# Patient Record
Sex: FEMALE | Race: WHITE | Employment: FULL TIME | URBAN - NONMETROPOLITAN AREA
[De-identification: names, ages, dates, MRNs, and addresses within clinical notes are randomized per-mention and may not be internally consistent; named-entity substitution may affect disease eponyms.]

---

## 2017-09-09 ENCOUNTER — OFFICE VISIT (OUTPATIENT)
Dept: PRIMARY CARE CLINIC | Age: 57
End: 2017-09-09
Payer: COMMERCIAL

## 2017-09-09 VITALS — DIASTOLIC BLOOD PRESSURE: 84 MMHG | TEMPERATURE: 98.4 F | SYSTOLIC BLOOD PRESSURE: 141 MMHG | HEART RATE: 71 BPM

## 2017-09-09 DIAGNOSIS — K13.0 CHEILITIS: Primary | ICD-10-CM

## 2017-09-09 DIAGNOSIS — H60.313 ACUTE DIFFUSE OTITIS EXTERNA OF BOTH EARS: ICD-10-CM

## 2017-09-09 PROCEDURE — 99213 OFFICE O/P EST LOW 20 MIN: CPT | Performed by: NURSE PRACTITIONER

## 2017-09-09 RX ORDER — LANOLIN 100 %
OIL (ML) MISCELLANEOUS
Qty: 1 BOTTLE | Refills: 0 | Status: SHIPPED | OUTPATIENT
Start: 2017-09-09 | End: 2018-03-14 | Stop reason: ALTCHOICE

## 2017-09-09 ASSESSMENT — ENCOUNTER SYMPTOMS
RHINORRHEA: 0
ABDOMINAL PAIN: 0
COUGH: 0

## 2017-12-12 ENCOUNTER — OFFICE VISIT (OUTPATIENT)
Dept: PRIMARY CARE CLINIC | Age: 57
End: 2017-12-12
Payer: COMMERCIAL

## 2017-12-12 VITALS
HEART RATE: 66 BPM | DIASTOLIC BLOOD PRESSURE: 85 MMHG | TEMPERATURE: 98.2 F | RESPIRATION RATE: 22 BRPM | WEIGHT: 117 LBS | BODY MASS INDEX: 18.36 KG/M2 | OXYGEN SATURATION: 98 % | SYSTOLIC BLOOD PRESSURE: 147 MMHG | HEIGHT: 67 IN

## 2017-12-12 DIAGNOSIS — J20.9 ACUTE BRONCHITIS, UNSPECIFIED ORGANISM: Primary | ICD-10-CM

## 2017-12-12 PROCEDURE — 99213 OFFICE O/P EST LOW 20 MIN: CPT | Performed by: NURSE PRACTITIONER

## 2017-12-12 RX ORDER — AMOXICILLIN AND CLAVULANATE POTASSIUM 875; 125 MG/1; MG/1
1 TABLET, FILM COATED ORAL 2 TIMES DAILY
Qty: 20 TABLET | Refills: 0 | Status: SHIPPED | OUTPATIENT
Start: 2017-12-12 | End: 2017-12-22

## 2017-12-12 RX ORDER — PREDNISONE 20 MG/1
TABLET ORAL
Qty: 12 TABLET | Refills: 0 | Status: SHIPPED | OUTPATIENT
Start: 2017-12-12 | End: 2018-03-02 | Stop reason: ALTCHOICE

## 2017-12-12 RX ORDER — ALBUTEROL SULFATE 90 UG/1
2 AEROSOL, METERED RESPIRATORY (INHALATION) EVERY 6 HOURS PRN
Qty: 1 INHALER | Refills: 0 | Status: SHIPPED | OUTPATIENT
Start: 2017-12-12 | End: 2019-04-04

## 2017-12-12 ASSESSMENT — ENCOUNTER SYMPTOMS
WHEEZING: 0
NAUSEA: 0
RHINORRHEA: 1
VOMITING: 0
COUGH: 1
DIARRHEA: 0
SORE THROAT: 0
SHORTNESS OF BREATH: 0

## 2017-12-12 NOTE — PATIENT INSTRUCTIONS
infection you have had within the past several weeks. To make sure prednisone is safe for you, tell your doctor if you have:  · any illness that causes diarrhea;  · liver disease (such as cirrhosis);  · kidney disease;  · heart disease, high blood pressure, low levels of potassium in your blood;  · a thyroid disorder;  · diabetes;  · a history of malaria;  · tuberculosis;  · osteoporosis;  · glaucoma, cataracts, or herpes infection of the eyes;  · stomach ulcers, ulcerative colitis, or a history of stomach bleeding;  · a muscle disorder such as myasthenia gravis; or  · depression or mental illness. Long-term use of steroids may lead to bone loss (osteoporosis), especially if you smoke, if you do not exercise, if you do not get enough vitamin D or calcium in your diet, or if you have a family history of osteoporosis. Talk with your doctor about your risk of osteoporosis. Prednisone can cause low birth weight or birth defects if you take the medicine during your first trimester. Tell your doctor if you are pregnant or plan to become pregnant while using this medication. Use effective birth control. Prednisone can pass into breast milk and may harm a nursing baby. Tell your doctor if you are breast-feeding a baby. Steroids can affect growth in children. Talk with your doctor if you think your child is not growing at a normal rate while using this medication. How should I take prednisone? Follow all directions on your prescription label. Your doctor may occasionally change your dose to make sure you get the best results. Do not take this medicine in larger or smaller amounts or for longer than recommended. Take with food. Your dosage needs may change if you have any unusual stress such as a serious illness, fever or infection, or if you have surgery or a medical emergency. Do not change your medication dose or schedule without your doctor's advice.   Measure liquid medicine with a special dose-measuring spoon or medicine cup. If you do not have a dose-measuring device, ask your pharmacist for one. Do not crush, chew, or break a delayed-release tablet. Swallow it whole. While using prednisone, you may need frequent blood tests at your doctor's office. Your blood pressure may also need to be checked. This medication can cause unusual results with certain medical tests. Tell any doctor who treats you that you are using prednisone. You should not stop using prednisone suddenly. Follow your doctor's instructions about tapering your dose. Wear a medical alert tag or carry an ID card stating that you take prednisone. Any medical care provider who treats you should know that you are using a steroid. Store at room temperature away from moisture and heat. What happens if I miss a dose? Take the missed dose as soon as you remember. Skip the missed dose if it is almost time for your next scheduled dose. Do not take extra medicine to make up the missed dose. What happens if I overdose? Seek emergency medical attention or call the Poison Help line at 1-256.223.5341. An overdose of prednisone is not expected to produce life threatening symptoms. However, long term use of high steroid doses can lead to symptoms such as thinning skin, easy bruising, changes in the shape or location of body fat (especially in your face, neck, back, and waist), increased acne or facial hair, menstrual problems, impotence, or loss of interest in sex. What should I avoid while taking prednisone? Avoid being near people who are sick or have infections. Call your doctor for preventive treatment if you are exposed to chicken pox or measles. These conditions can be serious or even fatal in people who are using a steroid. Do not receive a \"live\" vaccine while using prednisone. Prednisone may increase your risk of harmful effects from a live vaccine.  Live vaccines include measles, mumps, rubella (MMR), rotavirus, typhoid, yellow fever, varicella (chickenpox), zoster (shingles), and nasal flu (influenza) vaccine. Avoid drinking alcohol while you are taking prednisone. What are the possible side effects of prednisone? Get emergency medical help if you have any of these signs of an allergic reaction: hives; difficult breathing; swelling of your face, lips, tongue, or throat. Call your doctor at once if you have:  · blurred vision, eye pain, or seeing halos around lights;  · swelling, rapid weight gain, feeling short of breath;  · severe depression, feelings of extreme happiness or sadness, changes in personality or behavior, seizure (convulsions);  · bloody or tarry stools, coughing up blood;  · pancreatitis (severe pain in your upper stomach spreading to your back, nausea and vomiting, fast heart rate);  · low potassium (confusion, uneven heart rate, extreme thirst, increased urination, leg discomfort, muscle weakness or limp feeling); or  · dangerously high blood pressure (severe headache, blurred vision, buzzing in your ears, anxiety, confusion, chest pain, shortness of breath, uneven heartbeats, seizure). Other common side effects may include:  · sleep problems (insomnia), mood changes;  · increased appetite, gradual weight gain;  · acne, increased sweating, dry skin, thinning skin, bruising or discoloration;  · slow wound healing;  · headache, dizziness, spinning sensation;  · nausea, stomach pain, bloating; or  · changes in the shape or location of body fat (especially in your arms, legs, face, neck, breasts, and waist). This is not a complete list of side effects and others may occur. Call your doctor for medical advice about side effects. You may report side effects to FDA at 1-725-FDA-1257. What other drugs will affect prednisone? Many drugs can interact with prednisone. Not all possible interactions are listed here.  Tell your doctor about all your medications and any you start or stop using during treatment with prednisone, your face, lips, tongue, or throat. Call your doctor at once if you have:  · wheezing, choking, or other breathing problems after using this medicine;  · chest pain, fast heart rate, pounding heartbeats or fluttering in your chest;  · pain or burning when you urinate;  · high blood sugar --increased thirst, increased urination, hunger, dry mouth, fruity breath odor, drowsiness, dry skin, blurred vision, weight loss; or  · low potassium --leg cramps, constipation, irregular heartbeats, fluttering in your chest, extreme thirst, increased urination, numbness or tingling, muscle weakness or limp feeling. Common side effects may include:  · back pain, body aches;  · headache, dizziness;  · feeling nervous;  · nausea, diarrhea, upset stomach; or  · sore throat, sinus pain, stuffy runny nose. This is not a complete list of side effects and others may occur. Call your doctor for medical advice about side effects. You may report side effects to FDA at 3-101-FDA-7298. What other drugs will affect albuterol inhalation? Tell your doctor about all your current medicines and any you start or stop using, especially:  · any other inhaled medicines or bronchodilators;  · digoxin;  · a diuretic or \"water pill\";  · an antidepressant --amitriptyline, desipramine, imipramine, doxepin, nortriptyline, and others;  · a beta blocker --atenolol, carvedilol, labetalol, metoprolol, propranolol, sotalol, and others; or  · an MAO inhibitor --isocarboxazid, linezolid, methylene blue injection, phenelzine, rasagiline, selegiline, tranylcypromine, and others. This list is not complete. Other drugs may interact with albuterol inhalation, including prescription and over-the-counter medicines, vitamins, and herbal products. Not all possible interactions are listed in this medication guide. Where can I get more information? Your pharmacist can provide more information about albuterol inhalation.     Remember, keep this and all other medicines amoxicillin and clavulanate potassium  Pronunciation:  am OKS i tanmay in JONATHAN cleary po TAS ee um  Brand:  Augmentin, Augmentin ES-600, Augmentin XR  What is the most important information I should know about amoxicillin and clavulanate potassium? Do not use this medication if you are allergic to amoxicillin or clavulanate potassium, or if you have ever had liver problems caused by this medication. Do not use if you are allergic to any other penicillin antibiotic, such as amoxicillin (Amoxil, Augmentin, Dispermox, Moxatag), ampicillin (Principen, Unasyn), dicloxacillin (Dycill, Dynapen), oxacillin (Bactocill), or penicillin (Bicillin L-A, PC Pen VK, Pfizerpen), and others. Before taking amoxicillin and clavulanate potassium, tell your doctor if you have liver disease (or a history of hepatitis or jaundice), kidney disease, or mononucleosis, or if you are allergic to a cephalosporin antibiotic, such as cefdinir (Omnicef), cefprozil (Cefzil), cefuroxime (Ceftin), cephalexin (Keflex), and others. If you switch from one tablet form to another (regular, chewable, or extended-release tablet), take only the new tablet form and strength prescribed for you. This medicine may not be as effective or could be harmful if you do not use the exact tablet form your doctor has prescribed. Amoxicillin and clavulanate potassium can pass into breast milk and may harm a nursing baby. Do not use this medication without telling your doctor if you are breast-feeding a baby. Amoxicillin and clavulanate potassium can make birth control pills less effective. Ask your doctor about using a non-hormone method of birth control (such as a condom, diaphragm, spermicide) to prevent pregnancy while taking amoxicillin and clavulanate potassium. What is amoxicillin and clavulanate potassium? Amoxicillin is an antibiotic in a group of drugs called penicillins. Amoxicillin fights bacteria in the body.   Clavulanate potassium is a form medication without telling your doctor if you are breast-feeding a baby. The liquid and chewable tablet forms of this medication may contain phenylalanine. Talk to your doctor before using these forms of amoxicillin and clavulanate potassium if you have phenylketonuria (PKU). How should I take amoxicillin and clavulanate potassium? Take exactly as prescribed by your doctor. Do not take in larger or smaller amounts or for longer than recommended. Follow the directions on your prescription label. If you switch from one tablet form to another (regular, chewable, or extended-release tablet), take only the new tablet form and strength prescribed for you. The strength of clavulanate potassium is not the same among the different tablet forms, even though the amount of amoxicillin may be the same as in the tablet you were using before. This medicine may not be as effective or could be harmful if you do not use the exact tablet form your doctor has prescribed. Take this medicine with a full glass of water. Take the medicine at the start of a meal to reduce stomach upset. Take the medicine at the same time each day. The Augmentin tablet should be swallowed whole. The Augmentin Chewable tablet must be chewed before swallowing. Do not swallow a chewable tablet whole. Do not crush or chew the Augmentin XR (extended-release) tablet. Swallow the pill whole, or break the pill in half and take both halves one at a time. If you have trouble swallowing a whole or half pill, talk with your doctor about using another form of amoxicillin and clavulanate potassium. Shake the liquid form of this medicine well just before you measure a dose. To be sure you get the correct dose, measure the liquid with a marked measuring spoon or medicine cup, not with a regular table spoon. If you do not have a dose-measuring device, ask your pharmacist for one. Take this medication for the full prescribed length of time.  Your symptoms may improve before the infection is completely cleared. Skipping doses may also increase your risk of further infection that is resistant to antibiotics. Amoxicillin and clavulanate potassium will not treat a viral infection such as the common cold or flu. This medication can cause false results with certain lab tests for glucose (sugar) in the urine. Tell any doctor who treats you that you are using amoxicillin and clavulanate potassium. Store the tablets at room temperature away from moisture and heat. Store the liquid  in the refrigerator. Throw away any unused liquid after 10 days. What happens if I miss a dose? Take the missed dose as soon as you remember. Skip the missed dose if it is almost time for your next scheduled dose. Do not take extra medicine to make up the missed dose. What happens if I overdose? Seek emergency medical attention or call the Poison Help line at 1-583.521.1799. Overdose can cause nausea, vomiting, stomach pain, diarrhea, skin rash, drowsiness, and hyperactivity. What should I avoid while taking amoxicillin and clavulanate potassium? Antibiotic medicines can cause diarrhea, which may be a sign of a new infection. If you have diarrhea that is watery or has blood in it, stop taking this medication and call your doctor. Do not use any medicine to stop the diarrhea unless your doctor has told you to. What are the possible side effects of amoxicillin and clavulanate potassium? Get emergency medical help if you have any of these signs of an allergic reaction: hives; difficulty breathing; swelling of your face, lips, tongue, or throat.   Stop using this medicine and call your doctor at once if you have a serious side effect such as:  · diarrhea that is watery or has blood in it;  · pale or yellowed skin, dark colored urine, fever, confusion or weakness;  · easy bruising or bleeding;  · skin rash, bruising, severe tingling, numbness, pain, muscle weakness;  · agitation, confusion, unusual thoughts or behavior, seizure (convulsions);  · nausea, upper stomach pain, itching, loss of appetite, dark urine, sally-colored stools, jaundice (yellowing of the skin or eyes); or  · severe skin reaction -- fever, sore throat, swelling in your face or tongue, burning in your eyes, skin pain, followed by a red or purple skin rash that spreads (especially in the face or upper body) and causes blistering and peeling. Less serious side effects may include:  · mild diarrhea, gas, stomach pain;  · nausea or vomiting;  · headache;  · skin rash or itching;  · white patches in your mouth or throat; or  · vaginal yeast infection (itching or discharge). This is not a complete list of side effects and others may occur. Call your doctor for medical advice about side effects. You may report side effects to FDA at 6-652-FDA-4966. What other drugs will affect amoxicillin and clavulanate potassium? Tell your doctor about all other medications you use, especially:  · allopurinol (Zyloprim);  · probenecid (Benemid);  · a blood thinner such as warfarin (Coumadin, Jantoven); or  · another antibiotic (for the same or for a different infection). This list is not complete and other drugs may interact with amoxicillin and clavulanate potassium. Tell your doctor about all medications you use. This includes prescription, over-the-counter, vitamin, and herbal products. Do not start a new medication without telling your doctor. Where can I get more information? Your pharmacist can provide more information about amoxicillin and clavulanate potassium. Remember, keep this and all other medicines out of the reach of children, never share your medicines with others, and use this medication only for the indication prescribed. Every effort has been made to ensure that the information [facility] is accurate, up-to-date, and complete, but no guarantee is made to that effect.  Drug information contained cause colds. Antibiotics usually do not help and they may be harmful. Bronchitis usually develops rapidly and lasts about 2 to 3 weeks in otherwise healthy people. Follow-up care is a key part of your treatment and safety. Be sure to make and go to all appointments, and call your doctor if you are having problems. It's also a good idea to know your test results and keep a list of the medicines you take. How can you care for yourself at home? · Take all medicines exactly as prescribed. Call your doctor if you think you are having a problem with your medicine. · Get some extra rest.  · Take an over-the-counter pain medicine, such as acetaminophen (Tylenol), ibuprofen (Advil, Motrin), or naproxen (Aleve) to reduce fever and relieve body aches. Read and follow all instructions on the label. · Do not take two or more pain medicines at the same time unless the doctor told you to. Many pain medicines have acetaminophen, which is Tylenol. Too much acetaminophen (Tylenol) can be harmful. · Take an over-the-counter cough medicine that contains dextromethorphan to help quiet a dry, hacking cough so that you can sleep. Avoid cough medicines that have more than one active ingredient. Read and follow all instructions on the label. · Breathe moist air from a humidifier, hot shower, or sink filled with hot water. The heat and moisture will thin mucus so you can cough it out. · Do not smoke. Smoking can make bronchitis worse. If you need help quitting, talk to your doctor about stop-smoking programs and medicines. These can increase your chances of quitting for good. When should you call for help? Call 911 anytime you think you may need emergency care. For example, call if:  ? · You have severe trouble breathing. ?Call your doctor now or seek immediate medical care if:  ? · You have new or worse trouble breathing. ? · You cough up dark brown or bloody mucus (sputum). ? · You have a new or higher fever.    ? · You is not approved for use by anyone younger than 3years old. How should I use albuterol inhalation? Follow all directions on your prescription label. Do not use this medicine in larger or smaller amounts or for longer than recommended. Read all patient information, medication guides, and instruction sheets provided to you. Ask your doctor or pharmacist if you have any questions. You may need to prime your inhaler device before the first use. Your medicine comes with directions for priming if needed. You may also need to shake your medicine just before each use. Follow all medication instructions very carefully. Do not allow a young child to use albuterol inhalation without help from an adult. The usual dose of albuterol inhalation is 2 inhalations every 4 to 6 hours. To prevent exercise-induced bronchospasm, use 2 inhalations 15 to 30 minutes before you exercise. The effects of albuterol inhalation should last about 4 to 6 hours. Seek medical attention if you think your asthma medications are not working as well. An increased need for medication could be an early sign of a serious asthma attack. Use the dose counter on your inhaler device and get your prescription refilled before you run out of medicine completely. Always use the new inhaler device provided with your refill. Do not float a medicine canister in water to see if it is empty. Follow all product instructions on how to clean your inhaler device and mouthpiece. Do not try to clean or take apart the ProAir RespiClick inhaler device. Asthma is often treated with a combination of drugs. Use all medications as directed by your doctor. Read the medication guide or patient instructions provided with each medication. Do not change your doses or medication schedule without your doctor's advice. Store this medicine at room temperature away from moisture, heat, or cold temperatures.   Keep the medicine canister away from open flame or high heat, such Prednisone prevents the release of substances in the body that cause inflammation. Prednisone also suppresses the immune system. Prednisone is used as an anti-inflammatory or an immunosuppressant medication. Prednisone treats many different conditions such as allergic disorders, skin conditions, ulcerative colitis, arthritis, lupus, psoriasis, or breathing disorders. Prednisone may also be used for purposes not listed in this medication guide. What should I discuss with my healthcare provider before taking prednisone? You should not use this medication if you are allergic to prednisone, or if you have a fungal infection anywhere in your body. Steroid medication can weaken your immune system, making it easier for you to get an infection or worsening an infection you already have or have recently had. Tell your doctor about any illness or infection you have had within the past several weeks. To make sure prednisone is safe for you, tell your doctor if you have:  any illness that causes diarrhea;  liver disease (such as cirrhosis);  kidney disease;  heart disease, high blood pressure, low levels of potassium in your blood;  a thyroid disorder;  diabetes;  a history of malaria;  tuberculosis;  osteoporosis;  glaucoma, cataracts, or herpes infection of the eyes;  stomach ulcers, ulcerative colitis, or a history of stomach bleeding;  a muscle disorder such as myasthenia gravis; or  depression or mental illness. Long-term use of steroids may lead to bone loss (osteoporosis), especially if you smoke, if you do not exercise, if you do not get enough vitamin D or calcium in your diet, or if you have a family history of osteoporosis. Talk with your doctor about your risk of osteoporosis. Prednisone can cause low birth weight or birth defects if you take the medicine during your first trimester. Tell your doctor if you are pregnant or plan to become pregnant while using this medication.  Use effective birth control. Prednisone can pass into breast milk and may harm a nursing baby. Tell your doctor if you are breast-feeding a baby. Steroids can affect growth in children. Talk with your doctor if you think your child is not growing at a normal rate while using this medication. How should I take prednisone? Follow all directions on your prescription label. Your doctor may occasionally change your dose to make sure you get the best results. Do not take this medicine in larger or smaller amounts or for longer than recommended. Take with food. Your dosage needs may change if you have any unusual stress such as a serious illness, fever or infection, or if you have surgery or a medical emergency. Do not change your medication dose or schedule without your doctor's advice. Measure liquid medicine with a special dose-measuring spoon or medicine cup. If you do not have a dose-measuring device, ask your pharmacist for one. Do not crush, chew, or break a delayed-release tablet. Swallow it whole. While using prednisone, you may need frequent blood tests at your doctor's office. Your blood pressure may also need to be checked. This medication can cause unusual results with certain medical tests. Tell any doctor who treats you that you are using prednisone. You should not stop using prednisone suddenly. Follow your doctor's instructions about tapering your dose. Wear a medical alert tag or carry an ID card stating that you take prednisone. Any medical care provider who treats you should know that you are using a steroid. Store at room temperature away from moisture and heat. What happens if I miss a dose? Take the missed dose as soon as you remember. Skip the missed dose if it is almost time for your next scheduled dose. Do not take extra medicine to make up the missed dose. What happens if I overdose? Seek emergency medical attention or call the Poison Help line at 1-661.163.4923.   An overdose of prednisone is not include:  sleep problems (insomnia), mood changes;  increased appetite, gradual weight gain;  acne, increased sweating, dry skin, thinning skin, bruising or discoloration;  slow wound healing;  headache, dizziness, spinning sensation;  nausea, stomach pain, bloating; or  changes in the shape or location of body fat (especially in your arms, legs, face, neck, breasts, and waist). This is not a complete list of side effects and others may occur. Call your doctor for medical advice about side effects. You may report side effects to FDA at 4-723-KWF-2059. What other drugs will affect prednisone? Many drugs can interact with prednisone. Not all possible interactions are listed here. Tell your doctor about all your medications and any you start or stop using during treatment with prednisone, especially:  amphotericin B;  cyclosporine;  digoxin, digitalis;  [place] wort;  an antibiotic such as clarithromycin or telithromycin;  antifungal medication such as itraconazole, ketoconazole, posaconazole, voriconazole;  birth control pills and other hormones;  a blood thinner such as warfarin, Coumadin;  a diuretic or \"water pill\";  the hepatitis C medications boceprevir or telaprevir;  HIV or AIDS medicine such as atazanavir, delavirdine, efavirenz, fosamprenavir, indinavir, nelfinavir, nevirapine, ritonavir, saquinavir;  insulin or diabetes medications you take by mouth;  a non-steroidal anti-inflammatory drug (NSAID) such as aspirin, ibuprofen (Advil, Motrin), naproxen (Aleve), celecoxib, diclofenac, indomethacin, meloxicam, and others;  seizure medications such as carbamazepine, fosphenytoin, oxcarbazepine, phenobarbital, phenytoin, primidone; or  the tuberculosis medications isoniazid, rifabutin, rifapentine, or rifampin. This list is not complete and many other drugs can interact with prednisone. This includes prescription and over-the-counter medicines, vitamins, and herbal products.  Give a list of all your medicines to any healthcare provider who treats you. Where can I get more information? Your pharmacist can provide more information about prednisone. Remember, keep this and all other medicines out of the reach of children, never share your medicines with others, and use this medication only for the indication prescribed. Every effort has been made to ensure that the information provided by [facility] [address] [address] is accurate, up-to-date, and complete, but no guarantee is made to that effect. Drug information contained herein may be time sensitive. [facility] information has been compiled for use by healthcare practitioners and consumers in the [place] and therefore [facility] does not warrant that uses outside of the [place] are appropriate, unless specifically indicated otherwise. [facility]'s drug information does not endorse drugs, diagnose patients or recommend therapy. [facility]'s drug information is an informational resource designed to assist licensed healthcare practitioners in caring for their patients and/or to serve consumers viewing this service as a supplement to, and not a substitute for, the expertise, skill, knowledge and judgment of healthcare practitioners. The absence of a warning for a given drug or drug combination in no way should be construed to indicate that the drug or drug combination is safe, effective or appropriate for any given patient. [facility] does not assume any responsibility for any aspect of healthcare administered with the aid of information [facility] provides. The information contained herein is not intended to cover all possible uses, directions, precautions, warnings, drug interactions, allergic reactions, or adverse effects. If you have questions about the drugs you are taking, check with your doctor, nurse or pharmacist.  Copyright 1590-1300 [address]. Version: 9.01. Revision date: 2/13/2013. Care instructions adapted under license by [facility] ([facility]).  If you have

## 2017-12-12 NOTE — PROGRESS NOTES
normal. No mastoid tenderness. Tympanic membrane is not injected, not erythematous and not bulging. No middle ear effusion. Nose: Mucosal edema and rhinorrhea present. Right sinus exhibits no maxillary sinus tenderness and no frontal sinus tenderness. Left sinus exhibits no maxillary sinus tenderness and no frontal sinus tenderness. Mouth/Throat: Uvula is midline, oropharynx is clear and moist and mucous membranes are normal. No oropharyngeal exudate, posterior oropharyngeal edema, posterior oropharyngeal erythema or tonsillar abscesses. Eyes: Conjunctivae are normal. Pupils are equal, round, and reactive to light. Right eye exhibits no discharge. Left eye exhibits no discharge. No scleral icterus. Cardiovascular: Normal rate, regular rhythm, S1 normal, S2 normal, normal heart sounds and intact distal pulses. Exam reveals no gallop and no friction rub. No murmur heard. Pulmonary/Chest: Effort normal. No accessory muscle usage. No tachypnea and no bradypnea. No respiratory distress. She has no decreased breath sounds. She has wheezes. She has no rhonchi. She has no rales. Harsh, moist, productive cough that is worse with deep inspiration. Breath sounds with expiratory wheezes B/L anterior and posterior lobes. Chest expansion symmetrical.  No audible wheezing or respiratory distress. No rales or rhonchi. Musculoskeletal: Normal range of motion. Lymphadenopathy:     She has no cervical adenopathy. Right cervical: No superficial cervical and no posterior cervical adenopathy present. Left cervical: No superficial cervical and no posterior cervical adenopathy present. Neurological: She is alert and oriented to person, place, and time. Skin: Skin is warm and dry. No rash noted. She is not diaphoretic. No erythema. No pallor. Psychiatric: She has a normal mood and affect. Her behavior is normal.   Nursing note and vitals reviewed.     BP (!) 147/85   Pulse 66   Temp 98.2 °F (36.8 °C) (Oral)   Resp 22   Ht 5' 7\" (1.702 m)   Wt 117 lb (53.1 kg)   SpO2 98%   BMI 18.32 kg/m²     Assessment:     1. Acute bronchitis, unspecified organism  amoxicillin-clavulanate (AUGMENTIN) 875-125 MG per tablet    predniSONE (DELTASONE) 20 MG tablet    albuterol sulfate HFA (PROAIR HFA) 108 (90 Base) MCG/ACT inhaler       Plan:      Return if symptoms worsen or fail to improve, for Resume all previous medications as directed. Orders Placed This Encounter   Medications    amoxicillin-clavulanate (AUGMENTIN) 875-125 MG per tablet     Sig: Take 1 tablet by mouth 2 times daily for 10 days     Dispense:  20 tablet     Refill:  0    predniSONE (DELTASONE) 20 MG tablet     Sig: Take 3 tabs by mouth on Days 1 & 2, 2 tabs on Days 3 & 4, 1 tab on Days 5 & 6. Take with food. Dispense:  12 tablet     Refill:  0    albuterol sulfate HFA (PROAIR HFA) 108 (90 Base) MCG/ACT inhaler     Sig: Inhale 2 puffs into the lungs every 6 hours as needed for Wheezing     Dispense:  1 Inhaler     Refill:  0      · Antibiotic as directed. Take until all doses are completed. · Probiotic or greek yogurt daily while on antibiotic. · Prednisone daily x 6 days as prescribed. Complete all doses as prescribed. Denies history of impaired liver function, diabetes, CHF, systemic fungal infection, osteoporosis, or glaucoma. Take with food at same time each day. Take the prednisone as directed on the prescription. Prednisone is very bitter so swallow tablets quickly to prevent dissolving in mouth. After taking, don't lay down for 2 hours to help prevent reflux. · Albuterol Inhaler 1-2 puffs every 4 to 6 hours as needed for wheezing or shortness of breath. Rinse mouth after use. · Practice meticulous handwashing and cover cough to prevent spread of infection  · Advised on smoking cessation  · Encouraged to increase fluids and rest  · Aleve/Ibuprofen/Tylenol OTC PRN for pain, discomfort or fever as directed on package.   Take with food.  · Cool mist humidifier  · Hot tea with honey and lemon for cough PRN  · Patient instructions given for acute bronchitis, augmentin, prednisone and albuterol. · To ER or call 911 if any difficulty breathing, shortness of breath, inability to swallow, hives, rash, facial/tongue swelling or temp greater than 103 degrees. · Follow up with PCP or [facility] as needed if symptoms worsen or do not improve. Barrie Lema received counseling on the following healthy behaviors: medication adherence. Patient given educational materials - see patient instructions. Discussed use, benefit, and side effects of prescribed medications. Treatment plan discussed at visit. Continue routine health care follow up. All patient questions answered. Pt voiced understanding.       Electronically signed by Reji Queen CNP on 12/12/2017 at 5:05 PM

## 2018-01-08 ENCOUNTER — OFFICE VISIT (OUTPATIENT)
Dept: PRIMARY CARE CLINIC | Age: 58
End: 2018-01-08
Payer: COMMERCIAL

## 2018-01-08 VITALS
SYSTOLIC BLOOD PRESSURE: 114 MMHG | OXYGEN SATURATION: 94 % | RESPIRATION RATE: 24 BRPM | HEART RATE: 84 BPM | DIASTOLIC BLOOD PRESSURE: 75 MMHG | WEIGHT: 117 LBS | BODY MASS INDEX: 18.36 KG/M2 | TEMPERATURE: 98.2 F | HEIGHT: 67 IN

## 2018-01-08 DIAGNOSIS — R50.9 FEVER, UNSPECIFIED FEVER CAUSE: ICD-10-CM

## 2018-01-08 DIAGNOSIS — J10.1 INFLUENZA A: Primary | ICD-10-CM

## 2018-01-08 LAB
INFLUENZA A ANTIBODY: POSITIVE
INFLUENZA B ANTIBODY: NEGATIVE

## 2018-01-08 PROCEDURE — 99213 OFFICE O/P EST LOW 20 MIN: CPT | Performed by: NURSE PRACTITIONER

## 2018-01-08 PROCEDURE — 87804 INFLUENZA ASSAY W/OPTIC: CPT | Performed by: NURSE PRACTITIONER

## 2018-01-08 RX ORDER — OSELTAMIVIR PHOSPHATE 75 MG/1
75 CAPSULE ORAL 2 TIMES DAILY
Qty: 10 CAPSULE | Refills: 0 | Status: SHIPPED | OUTPATIENT
Start: 2018-01-08 | End: 2018-01-13

## 2018-01-08 ASSESSMENT — ENCOUNTER SYMPTOMS
WHEEZING: 0
SINUS PAIN: 0
SORE THROAT: 0
RHINORRHEA: 1
SINUS PRESSURE: 0
COUGH: 1
NAUSEA: 0
ABDOMINAL PAIN: 0
SHORTNESS OF BREATH: 0
BLOATING: 0
DIARRHEA: 1
VOMITING: 0

## 2018-01-08 NOTE — PATIENT INSTRUCTIONS
system (caused by disease or by using certain medicine);  · hereditary fructose intolerance; or  · if you have used a nasal flu vaccine (FluMist) within the past 2 weeks. It is not known whether oseltamivir is harmful to an unborn baby. However, not receiving this medication to prevent influenza could be harmful to the baby if the mother becomes infected with a disease that oseltamivir could prevent. Tell your doctor if you are pregnant. Your doctor will decide whether you should receive oseltamivir. It is not known whether oseltamivir passes into breast milk or if it could harm a nursing baby. Tell your doctor if you are breast-feeding a baby. Do not use this medication to prevent influenza in a child younger than 3year old. Do not use this medication to treat influenza in a child younger than 3weeks old. How should I take oseltamivir? Follow all directions on your prescription label. Do not take this medicine in larger or smaller amounts or for longer than recommended. Start taking oseltamivir as soon as possible after flu symptoms appear, such as fever, chills, muscle aches, sore throat, and runny or stuffy nose. Take the oseltamivir capsule with a full glass of water. Shake the oral suspension (liquid) well just before you measure a dose. Measure the liquid with a special dose-measuring spoon or medicine cup. If you do not have a dose-measuring device, ask your pharmacist for one. You may open the oseltamivir capsule and sprinkle the medicine into a sweet liquid (corn syrup, chocolate syrup, brown sugar dissolved in water) to make swallowing easier. Swallow the mixture right away without chewing. Do not save for later use. Ask your doctor or pharmacist before using this method to prepare an oseltamivir dose for a child who is younger than 15years old or weighs less than 88 pounds. Oseltamivir may be taken with food or milk if it upsets your stomach.   To treat  flu symptoms: Take oseltamivir every 12 hours for 5 days. To prevent  flu symptoms: Take oseltamivir every 24 hours for 10 days or as prescribed. Follow your doctor's instructions. Use this medication for the entire length of time prescribed by your doctor. Your symptoms may get better before the infection is completely treated. Tell your doctor if your symptoms do not improve, or if they get worse. Store oseltamivir capsules at room temperature away from moisture and heat. Store oseltamivir liquid in the refrigerator but do not freeze. Throw away any unused liquid after 17 days. The liquid may also be stored at cool room temperature for up to 10 days  What happens if I miss a dose? Take the missed dose as soon as you remember. Skip the missed dose if your next dose is less than 2 hours away. Do not take extra medicine to make up the missed dose. What happens if I overdose? Seek emergency medical attention or call the [employer] Help line at 1-553.875.9241. What should I avoid while taking oseltamivir? Do not use a nasal flu vaccine (FluMist) within 48 hours after taking oseltamivir. Oseltamivir may interfere with the drug action of FluMist, making the vaccine less effective. Follow your doctor's instructions. What are the possible side effects of oseltamivir? Get emergency medical help if you have any of these signs of an allergic reaction:  · chest pain or tightness, difficult breathing;  · fever, sore throat, swelling in your face or tongue, burning in your eyes; or  · hives, skin pain, followed by a red or purple skin rash that spreads (especially in the face or upper body) and causes blistering and peeling. Some people using oseltamivir have had rare side effects of sudden confusion, shaking, problems with speech, hallucinations (hearing or seeing things that are not there), or seizure (convulsions). These symptoms have occurred most often in children, but it is not known whether oseltamivir was the exact cause.  Anyone using oseltamivir should be watched closely for signs of confusion or unusual behavior, especially a child. Common side effects may include:  · nausea, vomiting;  · headache; or  · pain. This is not a complete list of side effects and others may occur. Call your doctor for medical advice about side effects. You may report side effects to FDA at 6-206-ZKU-3923. What other drugs will affect oseltamivir? Other drugs may interact with oseltamivir, including prescription and over-the-counter medicines, vitamins, and herbal products. Tell each of your health care providers about all medicines you use now and any medicine you start or stop using. Where can I get more information? Your pharmacist can provide more information about oseltamivir. Remember, keep this and all other medicines out of the reach of children, never share your medicines with others, and use this medication only for the indication prescribed. Every effort has been made to ensure that the information provided by [address] is accurate, up-to-date, and complete, but no guarantee is made to that effect. Drug information contained herein may be time sensitive. [employer] information has been compiled for use by healthcare practitioners and consumers in the [place] and therefore [employer] does not warrant that uses outside of the [place] are appropriate, unless specifically indicated otherwise. [employer] drug information does not endorse drugs, diagnose patients or recommend therapy. [employer] drug information is an informational resource designed to assist licensed healthcare practitioners in caring for their patients and/or to serve consumers viewing this service as a supplement to, and not a substitute for, the expertise, skill, knowledge and judgment of healthcare practitioners.  The absence of a warning for a given drug or drug combination in no way should be construed to indicate that the drug or drug combination is safe, effective or rest.  · Drink plenty of fluids, enough so that your urine is light yellow or clear like water. If you have kidney, heart, or liver disease and have to limit fluids, talk with your doctor before you increase the amount of fluids you drink. · Take an over-the-counter pain medicine if needed, such as acetaminophen (Tylenol), ibuprofen (Advil, Motrin), or naproxen (Aleve), to relieve fever, headache, and muscle aches. Read and follow all instructions on the label. No one younger than 20 should take aspirin. It has been linked to Reye syndrome, a serious illness. · Do not smoke. Smoking can make the flu worse. If you need help quitting, talk to your doctor about stop-smoking programs and medicines. These can increase your chances of quitting for good. · Breathe moist air from a hot shower or from a sink filled with hot water to help clear a stuffy nose. · Before you use cough and cold medicines, check the label. These medicines may not be safe for young children or for people with certain health problems. · If the skin around your nose and lips becomes sore, put some petroleum jelly on the area. · To ease coughing:  ¨ Drink fluids to soothe a scratchy throat. ¨ Suck on cough drops or plain hard candy. ¨ Take an over-the-counter cough medicine that contains dextromethorphan to help you get some sleep. Read and follow all instructions on the label. ¨ Raise your head at night with an extra pillow. This may help you rest if coughing keeps you awake. · Take any prescribed medicine exactly as directed. Call your doctor if you think you are having a problem with your medicine. To avoid spreading the flu  · Wash your hands regularly, and keep your hands away from your face. · Stay home from school, work, and other public places until you are feeling better and your fever has been gone for at least 24 hours. The fever needs to have gone away on its own without the help of medicine.   · Ask people living with you to

## 2018-01-08 NOTE — LETTER
[facility] [place] [id number]  Phone: [phone]  Fax: [place] , [place]        January 8, 2018     Patient: Krystin Calderon   YOB: 1960   Date of Visit: 1/8/2018       To Whom it May Concern:    Krystin Calderon was seen in my clinic on 1/8/2018. She may return to work on 01/11/2018. Please excuse for 01/08, 01/09 and 01/10/18. If you have any questions or concerns, please don't hesitate to call.     Sincerely,         Dariusz Reyes, CNP

## 2018-03-02 ENCOUNTER — OFFICE VISIT (OUTPATIENT)
Dept: PRIMARY CARE CLINIC | Age: 58
End: 2018-03-02
Payer: COMMERCIAL

## 2018-03-02 VITALS
WEIGHT: 112.2 LBS | BODY MASS INDEX: 18.03 KG/M2 | OXYGEN SATURATION: 97 % | RESPIRATION RATE: 20 BRPM | SYSTOLIC BLOOD PRESSURE: 132 MMHG | HEIGHT: 66 IN | DIASTOLIC BLOOD PRESSURE: 84 MMHG | TEMPERATURE: 98.9 F | HEART RATE: 88 BPM

## 2018-03-02 DIAGNOSIS — J20.9 BRONCHITIS, ACUTE, WITH BRONCHOSPASM: Primary | ICD-10-CM

## 2018-03-02 DIAGNOSIS — H60.393 OTHER INFECTIVE ACUTE OTITIS EXTERNA OF BOTH EARS: ICD-10-CM

## 2018-03-02 PROCEDURE — 99213 OFFICE O/P EST LOW 20 MIN: CPT | Performed by: NURSE PRACTITIONER

## 2018-03-02 RX ORDER — OFLOXACIN 3 MG/ML
5 SOLUTION AURICULAR (OTIC) 2 TIMES DAILY
Qty: 10 ML | Refills: 0 | Status: SHIPPED | OUTPATIENT
Start: 2018-03-02 | End: 2018-03-09

## 2018-03-02 RX ORDER — PREDNISONE 20 MG/1
TABLET ORAL
Qty: 12 TABLET | Refills: 0 | Status: SHIPPED | OUTPATIENT
Start: 2018-03-02 | End: 2018-03-12 | Stop reason: ALTCHOICE

## 2018-03-02 RX ORDER — MULTIVIT WITH MINERALS/LUTEIN
250 TABLET ORAL DAILY
COMMUNITY

## 2018-03-02 RX ORDER — ACETAMINOPHEN 160 MG
TABLET,DISINTEGRATING ORAL
COMMUNITY

## 2018-03-02 RX ORDER — AMOXICILLIN AND CLAVULANATE POTASSIUM 875; 125 MG/1; MG/1
1 TABLET, FILM COATED ORAL 2 TIMES DAILY
Qty: 20 TABLET | Refills: 0 | Status: SHIPPED | OUTPATIENT
Start: 2018-03-02 | End: 2018-03-12 | Stop reason: ALTCHOICE

## 2018-03-02 ASSESSMENT — ENCOUNTER SYMPTOMS
SHORTNESS OF BREATH: 1
NAUSEA: 0
RHINORRHEA: 1
ABDOMINAL PAIN: 0
SINUS PRESSURE: 1
COUGH: 1
DIARRHEA: 0
VOMITING: 0
SORE THROAT: 1
WHEEZING: 1

## 2018-03-02 NOTE — PATIENT INSTRUCTIONS
SURVEY:    You may be receiving a survey from [employer] regarding your visit today. Please complete the survey to enable us to provide the highest quality of care to you and your family. If you cannot score us a very good on any question, please call the office to discuss how we could of made your experience a very good one. Thank you. Patient Education   Patient Education   Patient Education   Patient Education   Patient Education          prednisone  Pronunciation:  PRED ni sone  Brand:  Deborah, Sterapred, Sterapred 12 DAY, Sterapred DS, Sterapred DS 12 DAY  What is the most important information I should know about prednisone? Prednisone treats many different conditions such as allergic disorders, skin conditions, ulcerative colitis, arthritis, lupus, psoriasis, or breathing disorders. You should not take prednisone if you have a fungal infection anywhere in your body. Steroid medication can weaken your immune system, making it easier for you to get an infection. Avoid being near people who are sick or have infections. Do not receive a \"live\" vaccine while using prednisone. Call your doctor at once if you have shortness of breath, severe pain in your upper stomach, bloody or tarry stools, severe depression, changes in personality or behavior, vision problems, or eye pain. You should not stop using prednisone suddenly. Follow your doctor's instructions about tapering your dose. What is prednisone? Prednisone is a steroid. Prednisone prevents the release of substances in the body that cause inflammation. Prednisone also suppresses the immune system. Prednisone is used as an anti-inflammatory or an immunosuppressant medication. Prednisone treats many different conditions such as allergic disorders, skin conditions, ulcerative colitis, arthritis, lupus, psoriasis, or breathing disorders. Prednisone may also be used for purposes not listed in this medication guide.   What should I discuss with my preventive treatment if you are exposed to chicken pox or measles. These conditions can be serious or even fatal in people who are using a steroid. Do not receive a \"live\" vaccine while using prednisone. Prednisone may increase your risk of harmful effects from a live vaccine. Live vaccines include measles, mumps, rubella (MMR), rotavirus, typhoid, yellow fever, varicella (chickenpox), zoster (shingles), and nasal flu (influenza) vaccine. Avoid drinking alcohol while you are taking prednisone. What are the possible side effects of prednisone? Get emergency medical help if you have any of these signs of an allergic reaction: hives; difficult breathing; swelling of your face, lips, tongue, or throat. Call your doctor at once if you have:  · blurred vision, eye pain, or seeing halos around lights;  · swelling, rapid weight gain, feeling short of breath;  · severe depression, feelings of extreme happiness or sadness, changes in personality or behavior, seizure (convulsions);  · bloody or tarry stools, coughing up blood;  · pancreatitis (severe pain in your upper stomach spreading to your back, nausea and vomiting, fast heart rate);  · low potassium (confusion, uneven heart rate, extreme thirst, increased urination, leg discomfort, muscle weakness or limp feeling); or  · dangerously high blood pressure (severe headache, blurred vision, buzzing in your ears, anxiety, confusion, chest pain, shortness of breath, uneven heartbeats, seizure). Other common side effects may include:  · sleep problems (insomnia), mood changes;  · increased appetite, gradual weight gain;  · acne, increased sweating, dry skin, thinning skin, bruising or discoloration;  · slow wound healing;  · headache, dizziness, spinning sensation;  · nausea, stomach pain, bloating; or  · changes in the shape or location of body fat (especially in your arms, legs, face, neck, breasts, and waist).   This is not a complete list of side effects and others may occur. Call your doctor for medical advice about side effects. You may report side effects to FDA at 4-480-BTP-0384. What other drugs will affect prednisone? Many drugs can interact with prednisone. Not all possible interactions are listed here. Tell your doctor about all your medications and any you start or stop using during treatment with prednisone, especially:  · amphotericin B;  · cyclosporine;  · digoxin, digitalis;  · Saige's wort;  · an antibiotic such as clarithromycin or telithromycin;  · antifungal medication such as itraconazole, ketoconazole, posaconazole, voriconazole;  · birth control pills and other hormones;  · a blood thinner such as warfarin, Coumadin;  · a diuretic or \"water pill\";  · the hepatitis C medications boceprevir or telaprevir;  · HIV or AIDS medicine such as atazanavir, delavirdine, efavirenz, fosamprenavir, indinavir, nelfinavir, nevirapine, ritonavir, saquinavir;  · insulin or diabetes medications you take by mouth;  · a non-steroidal anti-inflammatory drug (NSAID) such as aspirin, ibuprofen (Advil, Motrin), naproxen (Aleve), celecoxib, diclofenac, indomethacin, meloxicam, and others;  · seizure medications such as carbamazepine, fosphenytoin, oxcarbazepine, phenobarbital, phenytoin, primidone; or  · the tuberculosis medications isoniazid, rifabutin, rifapentine, or rifampin. This list is not complete and many other drugs can interact with prednisone. This includes prescription and over-the-counter medicines, vitamins, and herbal products. Give a list of all your medicines to any healthcare provider who treats you. Where can I get more information? Your pharmacist can provide more information about prednisone. Remember, keep this and all other medicines out of the reach of children, never share your medicines with others, and use this medication only for the indication prescribed.   Every effort has been made to ensure that the information provided by [address] [place] [address] Dr WEST Tell each of your healthcare providers about all your medical conditions, allergies, and all medicines you use. What is ofloxacin otic? Ofloxacin is an antibiotic that treats infections caused by bacteria. Ofloxacin otic (for the ear) is used to treat infections of the ear canal in adults and children who are at least 6 months old. Ofloxacin otic is used in adults and children at least 3year old to treat an inner ear infection (also called otitis media). Ofloxacin otic may be used on a long-term basis to treat an infection that causes a hole in the ear drum (ruptured ear drum) in adults and children who are at least 15years old. Ofloxacin may also be used for purposes not listed in this medication guide. What should I discuss with my healthcare provider before using ofloxacin otic? You should not use this medicine if you are allergic to ofloxacin or similar antibiotics, such as ciprofloxacin (Cipro), gatifloxacin (Tequin), levofloxacin (Levaquin), lomefloxacin (Maxaquin), moxifloxacin (Avelox), or norfloxacin (Noroxin). FDA pregnancy category C. It is not known whether ofloxacin otic will harm an unborn baby. Tell your doctor if you are pregnant or plan to become pregnant while using this medicine. It is not known whether this medicine passes into breast milk or if it could harm a nursing baby. You should not breast-feed while using this medicine. Do not give this medicine to a child without medical advice. How should I use ofloxacin otic? Follow all directions on your prescription label. Do not use this medicine in larger or smaller amounts or for longer than recommended. Shake the medicine well just before each use. You may warm the medicine before use by holding the bottle in your hand for 1 or 2 minutes. Using cold ear drops can cause dizziness. To use the ear drops:  · Lie down or tilt your head with your ear facing upward.  Open the ear canal by gently pulling your ear back, or pulling downward [facility] information does not endorse drugs, diagnose patients or recommend therapy. [facility] drug information is an informational resource designed to assist licensed healthcare practitioners in caring for their patients and/or to serve consumers viewing this service as a supplement to, and not a substitute for, the expertise, skill, knowledge and judgment of healthcare practitioners. The absence of a warning for a given drug or drug combination in no way should be construed to indicate that the drug or drug combination is safe, effective or appropriate for any given patient. [facility] does not assume any responsibility for any aspect of healthcare administered with the aid of information [facility] provides. The information contained herein is not intended to cover all possible uses, directions, precautions, warnings, drug interactions, allergic reactions, or adverse effects. If you have questions about the drugs you are taking, check with your doctor, nurse or pharmacist.  Copyright 7563-2483 [address]: 2.01. Revision date: 6/6/2014. Care instructions adapted under license by [facility] ([facility] [place]). If you have questions about a medical condition or this instruction, always ask your healthcare professional. [place] [address] any warranty or liability for your use of this information. amoxicillin and clavulanate potassium  Pronunciation:  am OKS i tanmay in JONATHAN vuong [facility] ee um  Brand:  Augmentin, Augmentin ES-600, Augmentin XR  What is the most important information I should know about amoxicillin and clavulanate potassium? Do not use this medication if you are allergic to amoxicillin or clavulanate potassium, or if you have ever had liver problems caused by this medication.   Do not use if you are allergic to any other penicillin antibiotic, such as amoxicillin (Amoxil, Augmentin, Dispermox, Moxatag), ampicillin (Principen, Unasyn), dicloxacillin (Dycill, Dynapen), new tablet form and strength prescribed for you. The strength of clavulanate potassium is not the same among the different tablet forms, even though the amount of amoxicillin may be the same as in the tablet you were using before. This medicine may not be as effective or could be harmful if you do not use the exact tablet form your doctor has prescribed. Take this medicine with a full glass of water. Take the medicine at the start of a meal to reduce stomach upset. Take the medicine at the same time each day. The Augmentin tablet should be swallowed whole. The Augmentin Chewable tablet must be chewed before swallowing. Do not swallow a chewable tablet whole. Do not crush or chew the Augmentin XR (extended-release) tablet. Swallow the pill whole, or break the pill in half and take both halves one at a time. If you have trouble swallowing a whole or half pill, talk with your doctor about using another form of amoxicillin and clavulanate potassium. Shake the liquid form of this medicine well just before you measure a dose. To be sure you get the correct dose, measure the liquid with a marked measuring spoon or medicine cup, not with a regular table spoon. If you do not have a dose-measuring device, ask your pharmacist for one. Take this medication for the full prescribed length of time. Your symptoms may improve before the infection is completely cleared. Skipping doses may also increase your risk of further infection that is resistant to antibiotics. Amoxicillin and clavulanate potassium will not treat a viral infection such as the common cold or flu. This medication can cause false results with certain lab tests for glucose (sugar) in the urine. Tell any doctor who treats you that you are using amoxicillin and clavulanate potassium. Store the tablets at room temperature away from moisture and heat. Store the liquid  in the refrigerator. Throw away any unused liquid after 10 days.   What happens if I miss a knowledge and judgment of healthcare practitioners. The absence of a warning for a given drug or drug combination in no way should be construed to indicate that the drug or drug combination is safe, effective or appropriate for any given patient. [facility] does not assume any responsibility for any aspect of healthcare administered with the aid of information [facility] provides. The information contained herein is not intended to cover all possible uses, directions, precautions, warnings, drug interactions, allergic reactions, or adverse effects. If you have questions about the drugs you are taking, check with your doctor, nurse or pharmacist.  Copyright 4831-0627 [address] Version: 9.01. Revision date: 2/1/2011. Care instructions adapted under license by [facility] ([facility]). If you have questions about a medical condition or this instruction, always ask your healthcare professional. [place] [address] any warranty or liability for your use of this information. Swimmer's Ear: Care Instructions  Your Care Instructions    Swimmer's ear (otitis externa) is inflammation or infection of the ear canal. This is the passage that leads from the outer ear to the eardrum. Any water, sand, or other debris that gets into the ear canal and stays there can cause swimmer's ear. Putting cotton swabs or other items in the ear to clean it can also cause this problem. Swimmer's ear can be very painful. But you can treat the pain and infection with medicines. You should feel better in a few days. Follow-up care is a key part of your treatment and safety. Be sure to make and go to all appointments, and call your doctor if you are having problems. It's also a good idea to know your test results and keep a list of the medicines you take. How can you care for yourself at home? Cleaning and care  · Use antibiotic drops as your doctor directs.   · Do not insert ear drops (other than the antibiotic ear drops) or anything else into the ear unless your doctor has told you to. · Avoid getting water in the ear until the problem clears up. Use cotton lightly coated with petroleum jelly as an earplug. Do not use plastic earplugs. · Use a hair dryer set on low to carefully dry the ear after you shower. · To ease ear pain, hold a warm washcloth against your ear. · Take pain medicines exactly as directed. ¨ If the doctor gave you a prescription medicine for pain, take it as prescribed. ¨ If you are not taking a prescription pain medicine, ask your doctor if you can take an over-the-counter medicine. Inserting ear drops  · Warm the drops to body temperature by rolling the container in your hands. Or you can place it in a cup of warm water for a few minutes. · Lie down, with your ear facing up. · Place drops inside the ear. Follow your doctor's instructions (or the directions on the label) for how many drops to use. Gently wiggle the outer ear or pull the ear up and back to help the drops get into the ear. · It's important to keep the liquid in the ear canal for 3 to 5 minutes. When should you call for help? Call your doctor now or seek immediate medical care if:  ? · You have a new or higher fever. ? · You have new or worse pain, swelling, warmth, or redness around or behind your ear. ? · You have new or increasing pus or blood draining from your ear. ? Watch closely for changes in your health, and be sure to contact your doctor if:  ? · You are not getting better after 2 days (48 hours). Where can you learn more? Go to [email or web address]://[employer]ocom[employer]seb.[email or web address] org and sign in to your [employer] account. Enter C706 in the [facility] box to learn more about \"Swimmer's Ear: Care Instructions. \"     If you do not have an account, please click on the \"Sign Up Now\" link. Current as of: May 12, 2017  Content Version: 11.5  © 7206-6355 [employer].  Care instructions adapted under license by [facility]. If you have questions about a medical condition or this instruction, always ask your healthcare professional. [address] any warranty or liability for your use of this information. Bronchitis: Care Instructions  Your Care Instructions    Bronchitis is inflammation of the bronchial tubes, which carry air to the lungs. The tubes swell and produce mucus, or phlegm. The mucus and inflamed bronchial tubes make you cough. You may have trouble breathing. Most cases of bronchitis are caused by viruses like those that cause colds. Antibiotics usually do not help and they may be harmful. Bronchitis usually develops rapidly and lasts about 2 to 3 weeks in otherwise healthy people. Follow-up care is a key part of your treatment and safety. Be sure to make and go to all appointments, and call your doctor if you are having problems. It's also a good idea to know your test results and keep a list of the medicines you take. How can you care for yourself at home? · Take all medicines exactly as prescribed. Call your doctor if you think you are having a problem with your medicine. · Get some extra rest.  · Take an over-the-counter pain medicine, such as acetaminophen (Tylenol), ibuprofen (Advil, Motrin), or naproxen (Aleve) to reduce fever and relieve body aches. Read and follow all instructions on the label. · Do not take two or more pain medicines at the same time unless the doctor told you to. Many pain medicines have acetaminophen, which is Tylenol. Too much acetaminophen (Tylenol) can be harmful. · Take an over-the-counter cough medicine that contains dextromethorphan to help quiet a dry, hacking cough so that you can sleep. Avoid cough medicines that have more than one active ingredient. Read and follow all instructions on the label. · Breathe moist air from a humidifier, hot shower, or sink filled with hot water.  The heat and moisture will thin mucus so you can cough it

## 2018-03-02 NOTE — LETTER
[facility] [id number]  Phone: [phone]  Fax: [address] [place] [phone], [phone] [facility]        March 2, 2018     Patient: Dax Ortiz   YOB: 1960   Date of Visit: 3/2/2018       To Whom it May Concern:    Dax Ortiz was seen in my clinic on 3/2/2018. She may return to work on 03/05/2018. Please excuse for 03/02/2018. If you have any questions or concerns, please don't hesitate to call.     Sincerely,         Madison Jeronimo, CNP

## 2018-03-02 NOTE — PROGRESS NOTES
vomiting. Skin: Negative for rash and wound. Neurological: Positive for headaches (slight). Negative for dizziness and light-headedness. Objective:     Physical Exam   Constitutional: She is oriented to person, place, and time. Vital signs are normal. She appears well-developed and well-nourished. She is cooperative. She does not appear ill. No distress. HENT:   Head: Normocephalic and atraumatic. Right Ear: Hearing, tympanic membrane and ear canal normal. There is swelling (external auditory canal with erythema, minimal edema and scant whtie debris) and tenderness. No mastoid tenderness. Tympanic membrane is not injected, not erythematous and not bulging. No middle ear effusion. Left Ear: Hearing, tympanic membrane and ear canal normal. There is swelling (external auditory canal with erythema and mild edema.) and tenderness. No mastoid tenderness. Tympanic membrane is not injected, not erythematous and not bulging. No middle ear effusion. Nose: Mucosal edema and rhinorrhea present. Right sinus exhibits no maxillary sinus tenderness and no frontal sinus tenderness. Left sinus exhibits no maxillary sinus tenderness and no frontal sinus tenderness. Mouth/Throat: Uvula is midline, oropharynx is clear and moist and mucous membranes are normal. No oropharyngeal exudate, posterior oropharyngeal edema, posterior oropharyngeal erythema or tonsillar abscesses. Eyes: Conjunctivae are normal. Pupils are equal, round, and reactive to light. Right eye exhibits no discharge. Left eye exhibits no discharge. No scleral icterus. Cardiovascular: Normal rate, regular rhythm, S1 normal, S2 normal, normal heart sounds and intact distal pulses. Exam reveals no gallop and no friction rub. No murmur heard. Pulmonary/Chest: Effort normal. No accessory muscle usage. No tachypnea and no bradypnea. No respiratory distress. She has no decreased breath sounds. She has wheezes. She has no rhonchi. She has no rales.

## 2018-03-12 ENCOUNTER — OFFICE VISIT (OUTPATIENT)
Dept: PRIMARY CARE CLINIC | Age: 58
End: 2018-03-12
Payer: COMMERCIAL

## 2018-03-12 VITALS
WEIGHT: 111.6 LBS | OXYGEN SATURATION: 100 % | BODY MASS INDEX: 17.94 KG/M2 | HEART RATE: 72 BPM | TEMPERATURE: 97.8 F | DIASTOLIC BLOOD PRESSURE: 85 MMHG | HEIGHT: 66 IN | SYSTOLIC BLOOD PRESSURE: 146 MMHG | RESPIRATION RATE: 22 BRPM

## 2018-03-12 DIAGNOSIS — R19.7 DIARRHEA, UNSPECIFIED TYPE: Primary | ICD-10-CM

## 2018-03-12 DIAGNOSIS — R42 DIZZY: ICD-10-CM

## 2018-03-12 LAB
BILIRUBIN, POC: NORMAL
BLOOD URINE, POC: NORMAL
CLARITY, POC: CLEAR
COLOR, POC: YELLOW
GLUCOSE URINE, POC: NORMAL
KETONES, POC: NORMAL
LEUKOCYTE EST, POC: NORMAL
NITRITE, POC: NORMAL
PH, POC: 5
PROTEIN, POC: NORMAL
SPECIFIC GRAVITY, POC: 1
UROBILINOGEN, POC: 0.2

## 2018-03-12 PROCEDURE — 99213 OFFICE O/P EST LOW 20 MIN: CPT | Performed by: NURSE PRACTITIONER

## 2018-03-12 PROCEDURE — 81003 URINALYSIS AUTO W/O SCOPE: CPT | Performed by: NURSE PRACTITIONER

## 2018-03-12 ASSESSMENT — ENCOUNTER SYMPTOMS
CHEST TIGHTNESS: 0
WHEEZING: 0
ABDOMINAL PAIN: 1
VOMITING: 1
NAUSEA: 1
BLOOD IN STOOL: 0
FACIAL SWELLING: 0
STRIDOR: 0
DIARRHEA: 1
COUGH: 0
SORE THROAT: 0
SINUS PRESSURE: 0
TROUBLE SWALLOWING: 0

## 2018-03-12 NOTE — PROGRESS NOTES
[address] [facility] WALK-IN [facility] [address] [id number]  Dept: [phone]  Dept Fax: [phone]    Ivon Mcwilliams is a 65 y.o. female who presents to the [address] [place] [address] in Care today for her medical conditions/complaints as noted below. Ivon Mcwilliams is c/o of Nausea & Vomiting and Diarrhea      HPI:   HPI  Ivon Mcwilliams is a 65 y.o. female who presents x 4 days of emesisIn which she reports began on Friday. Symptoms seemed to increase and worsen on Friday and Saturday. She reports on Saturday she was having \"constant diarrhea\". There is been no fevers no chills. No abdominal pain. No complaints with UTIs. She reports that today she has eaten and retained oatmeal and has been sipping on fluids. Last diarrheal stool is reported as yesterday which she describes as brown water, denying noticeable blood and or mucus in the stool. Last emesis is reported as yesterday. She has been using Pepto-Bismol with the last was reported as Saturday. Today she reports that she feels weak and tired, with a \"gurgly stomach\". She has had to miss work due to the diarrhea. She is also began to notice occasional dizziness with movement. There is been no recent travel. No suspicious foods and or long. She is currently taking Augmentin for a bronchitis which was evaluated and treated by this office. Patient reports that her last PCP prescribed her famotidine, she reports that she still has some left over and has been trying to take it when she eats fast food. She reports that she does not routinely have problems eating fast food and or high fatty foods. 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 She is requesting a work note today.     Past Medical History:   Diagnosis Date    Allergic rhinitis     dust mites and nickel        Current Outpatient Prescriptions   Medication Sig Dispense Refill    Multiple Vitamin (MULTI-VITAMIN DAILY PO) Take by mouth      Ascorbic Acid (VITAMIN C) 250 MG tablet Take 250 mg by mouth daily      Cholecalciferol (VITAMIN D3) 2000 units CAPS Take by mouth      Lanolin OIL Use as needed for dry lips 1 Bottle 0    diphenhydrAMINE (BENADRYL) 25 MG tablet Take 25 mg by mouth every 6 hours as needed for Itching      albuterol sulfate HFA (PROAIR HFA) 108 (90 Base) MCG/ACT inhaler Inhale 2 puffs into the lungs every 6 hours as needed for Wheezing 1 Inhaler 0    acetaminophen (TYLENOL) 325 MG tablet Take 325 mg by mouth every 6 hours as needed for Pain       No current facility-administered medications for this visit. Allergies   Allergen Reactions    Latex     Other        Subjective:      Review of Systems   Constitutional: Negative for activity change, chills, fatigue and fever. HENT: Negative for congestion, ear discharge, ear pain, facial swelling, postnasal drip, sinus pressure, sore throat and trouble swallowing. Respiratory: Negative for cough, chest tightness, wheezing and stridor. Cardiovascular: Negative. Gastrointestinal: Positive for abdominal pain (\"because I'm hungry\"), diarrhea, nausea and vomiting. Negative for blood in stool. Genitourinary: Negative. Skin: Negative. Neurological: Positive for light-headedness. Objective:     Physical Exam   Constitutional: She appears well-developed and well-nourished. Appearing to be of stated age with warm and dry skin, no apparent distress; well-appearing, well-hydrated, non-toxic, comfortable, alert and oriented, pleasant and talkative, in no apparent distress. Cardiovascular: Normal rate and regular rhythm. Pulmonary/Chest: Effort normal and breath sounds normal.   Abdominal: Soft. Normal appearance. There is no tenderness. There is no CVA tenderness. There is no distension, scarring or superficial abnormality on visual inspection.   On ausculation there are active bowel yogurt, greek). Perla Hair and or guardian received counseling regarding nutrition and medication adherence      Return for ED for worsening symptoms, Re Eval with PCP or first available practitioner. No orders of the defined types were placed in this encounter.          Electronically signed by Brendan Leone NP on 3/12/2018 at 8:41 PM

## 2018-03-12 NOTE — PATIENT INSTRUCTIONS
Patient Education        Diarrhea: Care Instructions  Your Care Instructions    Diarrhea is loose, watery stools (bowel movements). The exact cause is often hard to find. Sometimes diarrhea is your body's way of getting rid of what caused an upset stomach. Viruses, food poisoning, and many medicines can cause diarrhea. Some people get diarrhea in response to emotional stress, anxiety, or certain foods. Almost everyone has diarrhea now and then. It usually isn't serious, and your stools will return to normal soon. The important thing to do is replace the fluids you have lost, so you can prevent dehydration. The doctor has checked you carefully, but problems can develop later. If you notice any problems or new symptoms, get medical treatment right away. Follow-up care is a key part of your treatment and safety. Be sure to make and go to all appointments, and call your doctor if you are having problems. It's also a good idea to know your test results and keep a list of the medicines you take. How can you care for yourself at home? · Watch for signs of dehydration, which means your body has lost too much water. Dehydration is a serious condition and should be treated right away. Signs of dehydration are:  ¨ Increasing thirst and dry eyes and mouth. ¨ Feeling faint or lightheaded. ¨ Darker urine, and a smaller amount of urine than normal.  · To prevent dehydration, drink plenty of fluids, enough so that your urine is light yellow or clear like water. Choose water and other caffeine-free clear liquids until you feel better. If you have kidney, heart, or liver disease and have to limit fluids, talk with your doctor before you increase the amount of fluids you drink. · Begin eating small amounts of mild foods the next day, if you feel like it. ¨ Try yogurt that has live cultures of Lactobacillus.  (Check the label.)  ¨ Avoid spicy foods, fruits, alcohol, and caffeine until 48 hours after all symptoms are

## 2018-03-14 ENCOUNTER — OFFICE VISIT (OUTPATIENT)
Dept: FAMILY MEDICINE CLINIC | Age: 58
End: 2018-03-14
Payer: COMMERCIAL

## 2018-03-14 VITALS
TEMPERATURE: 98 F | DIASTOLIC BLOOD PRESSURE: 70 MMHG | WEIGHT: 113 LBS | OXYGEN SATURATION: 97 % | BODY MASS INDEX: 18.83 KG/M2 | HEIGHT: 65 IN | SYSTOLIC BLOOD PRESSURE: 120 MMHG | HEART RATE: 82 BPM

## 2018-03-14 DIAGNOSIS — K52.9 ACUTE GASTROENTERITIS: Primary | ICD-10-CM

## 2018-03-14 DIAGNOSIS — R05.9 COUGH: ICD-10-CM

## 2018-03-14 DIAGNOSIS — F17.210 CIGARETTE SMOKER: ICD-10-CM

## 2018-03-14 PROCEDURE — 99214 OFFICE O/P EST MOD 30 MIN: CPT | Performed by: FAMILY MEDICINE

## 2018-03-14 RX ORDER — FAMOTIDINE 20 MG/1
20 TABLET, FILM COATED ORAL 2 TIMES DAILY
Status: ON HOLD | COMMUNITY
End: 2021-12-11 | Stop reason: HOSPADM

## 2018-03-14 ASSESSMENT — PATIENT HEALTH QUESTIONNAIRE - PHQ9
SUM OF ALL RESPONSES TO PHQ9 QUESTIONS 1 & 2: 0
SUM OF ALL RESPONSES TO PHQ QUESTIONS 1-9: 0
2. FEELING DOWN, DEPRESSED OR HOPELESS: 0
1. LITTLE INTEREST OR PLEASURE IN DOING THINGS: 0

## 2018-03-14 ASSESSMENT — ENCOUNTER SYMPTOMS: RESPIRATORY NEGATIVE: 1

## 2018-03-14 NOTE — PROGRESS NOTES
worsen. Start probiotic. Cough again seems to be much better and may be related to recently stopping smoking. Patient does plan to follow up in the near future for a well woman exam.  If her cough persists or worsens again I would certainly get a chest x-ray in this long-time smoker. Continue to work on smoking cessation. Elizabeth Manson received counseling on the following healthy behaviors: nutrition  Reviewed prior labs and health maintenance. Continue current medications, diet and exercise. Discussed use, benefit, and side effects of prescribed medications. Barriers to medication compliance addressed. Patient given educational materials - see patient instructions. All patient questions answered. Patient voiced understanding.      Electronically signed by Delvin Villegas DO on 3/14/2018 at 3:04 PM

## 2018-04-30 ENCOUNTER — OFFICE VISIT (OUTPATIENT)
Dept: FAMILY MEDICINE CLINIC | Age: 58
End: 2018-04-30
Payer: COMMERCIAL

## 2018-04-30 ENCOUNTER — HOSPITAL ENCOUNTER (OUTPATIENT)
Age: 58
Discharge: HOME | End: 2018-05-02
Payer: COMMERCIAL

## 2018-04-30 VITALS
BODY MASS INDEX: 17.94 KG/M2 | DIASTOLIC BLOOD PRESSURE: 70 MMHG | HEIGHT: 66 IN | OXYGEN SATURATION: 96 % | TEMPERATURE: 97.9 F | SYSTOLIC BLOOD PRESSURE: 120 MMHG | WEIGHT: 111.6 LBS | HEART RATE: 88 BPM

## 2018-04-30 DIAGNOSIS — R05.3 PERSISTENT COUGH: ICD-10-CM

## 2018-04-30 DIAGNOSIS — Z12.11 SCREENING FOR COLON CANCER: ICD-10-CM

## 2018-04-30 DIAGNOSIS — H60.393 OTITIS EXTERNA, CHRONIC INFECTIVE, BILATERAL: ICD-10-CM

## 2018-04-30 DIAGNOSIS — H43.391 FLOATERS IN VISUAL FIELD, RIGHT: Primary | ICD-10-CM

## 2018-04-30 PROCEDURE — 99214 OFFICE O/P EST MOD 30 MIN: CPT | Performed by: FAMILY MEDICINE

## 2018-04-30 PROCEDURE — 69209 REMOVE IMPACTED EAR WAX UNI: CPT | Performed by: FAMILY MEDICINE

## 2018-05-01 ENCOUNTER — HOSPITAL ENCOUNTER (OUTPATIENT)
Dept: GENERAL RADIOLOGY | Age: 58
Discharge: HOME | End: 2018-05-03
Payer: COMMERCIAL

## 2018-05-01 DIAGNOSIS — R05.3 PERSISTENT COUGH: ICD-10-CM

## 2018-05-01 PROCEDURE — 71046 X-RAY EXAM CHEST 2 VIEWS: CPT

## 2018-05-06 ASSESSMENT — ENCOUNTER SYMPTOMS: GASTROINTESTINAL NEGATIVE: 1

## 2018-05-15 ENCOUNTER — TELEPHONE (OUTPATIENT)
Dept: FAMILY MEDICINE CLINIC | Age: 58
End: 2018-05-15

## 2018-05-15 DIAGNOSIS — R05.3 PERSISTENT COUGH: Primary | ICD-10-CM

## 2018-05-15 DIAGNOSIS — R63.4 WEIGHT LOSS: ICD-10-CM

## 2018-05-15 DIAGNOSIS — F17.210 CIGARETTE SMOKER: ICD-10-CM

## 2018-05-22 ENCOUNTER — HOSPITAL ENCOUNTER (OUTPATIENT)
Dept: CT IMAGING | Age: 58
Discharge: HOME | End: 2018-05-24
Payer: COMMERCIAL

## 2018-05-22 DIAGNOSIS — R05.3 PERSISTENT COUGH: ICD-10-CM

## 2018-05-22 DIAGNOSIS — R63.4 WEIGHT LOSS: ICD-10-CM

## 2018-05-22 DIAGNOSIS — F17.210 CIGARETTE SMOKER: ICD-10-CM

## 2018-05-22 LAB
BUN BLDV-MCNC: 14 MG/DL (ref 6–20)
CREAT SERPL-MCNC: 0.85 MG/DL (ref 0.5–0.9)
GFR AFRICAN AMERICAN: >60 ML/MIN
GFR NON-AFRICAN AMERICAN: >60 ML/MIN
GFR SERPL CREATININE-BSD FRML MDRD: NORMAL ML/MIN/{1.73_M2}
GFR SERPL CREATININE-BSD FRML MDRD: NORMAL ML/MIN/{1.73_M2}

## 2018-05-22 PROCEDURE — 84520 ASSAY OF UREA NITROGEN: CPT

## 2018-05-22 PROCEDURE — 82565 ASSAY OF CREATININE: CPT

## 2018-05-22 PROCEDURE — 71260 CT THORAX DX C+: CPT

## 2018-05-22 PROCEDURE — 6360000004 HC RX CONTRAST MEDICATION: Performed by: FAMILY MEDICINE

## 2018-05-22 PROCEDURE — 36415 COLL VENOUS BLD VENIPUNCTURE: CPT

## 2018-05-22 RX ADMIN — IOPAMIDOL 75 ML: 755 INJECTION, SOLUTION INTRAVENOUS at 15:12

## 2019-02-13 ENCOUNTER — OFFICE VISIT (OUTPATIENT)
Dept: PRIMARY CARE CLINIC | Age: 59
End: 2019-02-13
Payer: COMMERCIAL

## 2019-02-13 VITALS
WEIGHT: 114 LBS | BODY MASS INDEX: 18.68 KG/M2 | SYSTOLIC BLOOD PRESSURE: 139 MMHG | TEMPERATURE: 97.5 F | OXYGEN SATURATION: 97 % | HEART RATE: 82 BPM | DIASTOLIC BLOOD PRESSURE: 87 MMHG

## 2019-02-13 DIAGNOSIS — H66.001 ACUTE SUPPURATIVE OTITIS MEDIA OF RIGHT EAR WITHOUT SPONTANEOUS RUPTURE OF TYMPANIC MEMBRANE, RECURRENCE NOT SPECIFIED: Primary | ICD-10-CM

## 2019-02-13 DIAGNOSIS — R05.9 COUGH: ICD-10-CM

## 2019-02-13 LAB
INFLUENZA A ANTIBODY: NEGATIVE
INFLUENZA B ANTIBODY: NEGATIVE

## 2019-02-13 PROCEDURE — 87804 INFLUENZA ASSAY W/OPTIC: CPT | Performed by: NURSE PRACTITIONER

## 2019-02-13 PROCEDURE — 99213 OFFICE O/P EST LOW 20 MIN: CPT | Performed by: NURSE PRACTITIONER

## 2019-02-13 RX ORDER — BENZONATATE 100 MG/1
100 CAPSULE ORAL 3 TIMES DAILY PRN
Qty: 21 CAPSULE | Refills: 0 | Status: SHIPPED | OUTPATIENT
Start: 2019-02-13 | End: 2019-02-20

## 2019-02-13 RX ORDER — ONDANSETRON 4 MG/1
TABLET, FILM COATED ORAL
Refills: 0 | COMMUNITY
Start: 2019-01-15 | End: 2019-11-08 | Stop reason: ALTCHOICE

## 2019-02-13 RX ORDER — AMOXICILLIN 875 MG/1
875 TABLET, COATED ORAL EVERY 12 HOURS
Qty: 14 TABLET | Refills: 0 | Status: SHIPPED | OUTPATIENT
Start: 2019-02-13 | End: 2019-02-20

## 2019-02-13 ASSESSMENT — ENCOUNTER SYMPTOMS
SINUS PRESSURE: 0
CHEST TIGHTNESS: 0
FACIAL SWELLING: 0
TROUBLE SWALLOWING: 0
VOMITING: 0
SWOLLEN GLANDS: 0
WHEEZING: 0
NAUSEA: 0
SORE THROAT: 0
STRIDOR: 0
COUGH: 1
FLU SYMPTOMS: 1

## 2019-04-04 ENCOUNTER — OFFICE VISIT (OUTPATIENT)
Dept: PRIMARY CARE CLINIC | Age: 59
End: 2019-04-04
Payer: COMMERCIAL

## 2019-04-04 VITALS
BODY MASS INDEX: 18.19 KG/M2 | HEART RATE: 83 BPM | WEIGHT: 111 LBS | SYSTOLIC BLOOD PRESSURE: 162 MMHG | DIASTOLIC BLOOD PRESSURE: 81 MMHG | OXYGEN SATURATION: 97 % | TEMPERATURE: 98 F

## 2019-04-04 DIAGNOSIS — H60.501 ACUTE OTITIS EXTERNA OF RIGHT EAR, UNSPECIFIED TYPE: ICD-10-CM

## 2019-04-04 DIAGNOSIS — H66.003 ACUTE SUPPURATIVE OTITIS MEDIA OF BOTH EARS WITHOUT SPONTANEOUS RUPTURE OF TYMPANIC MEMBRANES, RECURRENCE NOT SPECIFIED: Primary | ICD-10-CM

## 2019-04-04 PROCEDURE — 99213 OFFICE O/P EST LOW 20 MIN: CPT | Performed by: NURSE PRACTITIONER

## 2019-04-04 RX ORDER — AMOXICILLIN AND CLAVULANATE POTASSIUM 875; 125 MG/1; MG/1
1 TABLET, FILM COATED ORAL 2 TIMES DAILY
Qty: 20 TABLET | Refills: 0 | Status: SHIPPED | OUTPATIENT
Start: 2019-04-04 | End: 2019-04-14

## 2019-04-04 RX ORDER — CIPROFLOXACIN AND DEXAMETHASONE 3; 1 MG/ML; MG/ML
4 SUSPENSION/ DROPS AURICULAR (OTIC) 2 TIMES DAILY
Qty: 7.5 ML | Refills: 0 | Status: SHIPPED | OUTPATIENT
Start: 2019-04-04 | End: 2019-04-11

## 2019-04-04 ASSESSMENT — ENCOUNTER SYMPTOMS
EYES NEGATIVE: 1
GASTROINTESTINAL NEGATIVE: 1
SORE THROAT: 0
RHINORRHEA: 1
COUGH: 1

## 2019-04-04 NOTE — PATIENT INSTRUCTIONS
Patient Education        Swimmer's Ear: Care Instructions  Your Care Instructions    Swimmer's ear (otitis externa) is inflammation or infection of the ear canal. This is the passage that leads from the outer ear to the eardrum. Any water, sand, or other debris that gets into the ear canal and stays there can cause swimmer's ear. Putting cotton swabs or other items in the ear to clean it can also cause this problem. Swimmer's ear can be very painful. But you can treat the pain and infection with medicines. You should feel better in a few days. Follow-up care is a key part of your treatment and safety. Be sure to make and go to all appointments, and call your doctor if you are having problems. It's also a good idea to know your test results and keep a list of the medicines you take. How can you care for yourself at home? Cleaning and care  · Use antibiotic drops as your doctor directs. · Do not insert ear drops (other than the antibiotic ear drops) or anything else into the ear unless your doctor has told you to. · Avoid getting water in the ear until the problem clears up. Use cotton lightly coated with petroleum jelly as an earplug. Do not use plastic earplugs. · Use a hair dryer set on low to carefully dry the ear after you shower. · To ease ear pain, hold a warm washcloth against your ear. · Take pain medicines exactly as directed. ? If the doctor gave you a prescription medicine for pain, take it as prescribed. ? If you are not taking a prescription pain medicine, ask your doctor if you can take an over-the-counter medicine. Inserting ear drops  · Warm the drops to body temperature by rolling the container in your hands. Or you can place it in a cup of warm water for a few minutes. · Lie down, with your ear facing up. · Place drops inside the ear. Follow your doctor's instructions (or the directions on the label) for how many drops to use.  Gently wiggle the outer ear or pull the ear up and back to help the drops get into the ear. · It's important to keep the liquid in the ear canal for 3 to 5 minutes. When should you call for help? Call your doctor now or seek immediate medical care if:    · You have a new or higher fever.     · You have new or worse pain, swelling, warmth, or redness around or behind your ear.     · You have new or increasing pus or blood draining from your ear.    Watch closely for changes in your health, and be sure to contact your doctor if:    · You are not getting better after 2 days (48 hours). Where can you learn more? Go to https://chpepiceweb.[employer]. org and sign in to your [employer] account. Enter C706 in the [employer] box to learn more about \"Swimmer's Ear: Care Instructions. \"     If you do not have an account, please click on the \"Sign Up Now\" link. Current as of: March 27, 2018  Content Version: 11.9  © 3563-7746 [employer]. Care instructions adapted under license by [facility] [employer] [facility] ([facility]). If you have questions about a medical condition or this instruction, always ask your healthcare professional. [place] [address] any warranty or liability for your use of this information. Patient Education        Ear Infection (Otitis Media): Care Instructions  Your Care Instructions    An ear infection may start with a cold and affect the middle ear (otitis media). It can hurt a lot. Most ear infections clear up on their own in a couple of days. Most often you will not need antibiotics. This is because many ear infections are caused by a virus. Antibiotics don't work against a virus. Regular doses of pain medicines are the best way to reduce your fever and help you feel better. Follow-up care is a key part of your treatment and safety. Be sure to make and go to all appointments, and call your doctor if you are having problems. It's also a good idea to know your test results and keep a list of the medicines you take.   How can you care for yourself at home? · Take pain medicines exactly as directed. ? If the doctor gave you a prescription medicine for pain, take it as prescribed. ? If you are not taking a prescription pain medicine, take an over-the-counter medicine, such as acetaminophen (Tylenol), ibuprofen (Advil, Motrin), or naproxen (Aleve). Read and follow all instructions on the label. ? Do not take two or more pain medicines at the same time unless the doctor told you to. Many pain medicines have acetaminophen, which is Tylenol. Too much acetaminophen (Tylenol) can be harmful. · Plan to take a full dose of pain reliever before bedtime. Getting enough sleep will help you get better. · Try a warm, moist washcloth on the ear. It may help relieve pain. · If your doctor prescribed antibiotics, take them as directed. Do not stop taking them just because you feel better. You need to take the full course of antibiotics. When should you call for help? Call your doctor now or seek immediate medical care if:    · You have new or increasing ear pain.     · You have new or increasing pus or blood draining from your ear.     · You have a fever with a stiff neck or a severe headache.    Watch closely for changes in your health, and be sure to contact your doctor if:    · You have new or worse symptoms.     · You are not getting better after taking an antibiotic for 2 days. Where can you learn more? Go to https://[address] [email or web address]. org and sign in to your [employer] account. Enter A296 in the [facility] box to learn more about \"Ear Infection (Otitis Media): Care Instructions. \"     If you do not have an account, please click on the \"Sign Up Now\" link. Current as of: March 27, 2018  Content Version: 11.9  © 0938-8553 [employer]. Care instructions adapted under license by [facility] ([facility]).  If you have questions about a medical condition or this instruction, always ask your healthcare professional. [employer] disclaims any warranty or liability for your use of this information.

## 2019-04-04 NOTE — LETTER
[facility] [address] [id number]  Phone: [phone]  Fax: [phone]    ROGELIO Mortensen CNP        April 4, 2019     Patient: Wendi Yip   YOB: 1960   Date of Visit: 4/4/2019       To Whom it May Concern:    Wendi Yip was seen in my clinic on 4/4/2019. She may return to work on 4/8/19. If you have any questions or concerns, please don't hesitate to call.     Sincerely,           ROGELIO Mortensen CNP

## 2019-04-04 NOTE — PROGRESS NOTES
[address] [facility] WALK-IN CARE  [place] [phone]  Dept: [phone]  Dept Fax: [phone]    Kelsey Clark is a 63 y.o. female who presents to the [address] [place] [address] in Care today for her medical conditions/complaints as noted below. Kelsey Clark is c/o of Ear Drainage (Pt presents with right ear pain and drainage. Symptoms started yesterday. )      HPI:    Kelsey Clark is a 63 y.o. female who presents with  Ear Drainage    There is pain in the right ear. This is a new problem. The current episode started yesterday. The problem has been gradually worsening. There has been no fever. Associated symptoms include coughing, ear discharge (brownish drainage) and rhinorrhea. Pertinent negatives include no sore throat. Associated symptoms comments: Popping and crackling in ear  . She has tried acetaminophen for the symptoms.  Has outer ear infections and chronic draingae         Past Medical History:   Diagnosis Date    Allergic rhinitis     dust mites and nickel        Current Outpatient Medications   Medication Sig Dispense Refill    ciprofloxacin-dexamethasone (CIPRODEX) 0.3-0.1 % otic suspension Place 4 drops into the right ear 2 times daily for 7 days 7.5 mL 0    amoxicillin-clavulanate (AUGMENTIN) 875-125 MG per tablet Take 1 tablet by mouth 2 times daily for 10 days 20 tablet 0    ondansetron (ZOFRAN) 4 MG tablet take 1 tablet by mouth every 4 to 6 hours if needed for nausea and vomiting  0    Probiotic Product (PROBIOTIC-10) CAPS Take by mouth daily      famotidine (PEPCID) 20 MG tablet Take 20 mg by mouth 2 times daily      Multiple Vitamin (MULTI-VITAMIN DAILY PO) Take by mouth      Ascorbic Acid (VITAMIN C) 250 MG tablet Take 250 mg by mouth daily      Cholecalciferol (VITAMIN D3) 2000 units CAPS Take by mouth      acetaminophen (TYLENOL) 325 MG tablet Take 325 mg by mouth every 6 hours as needed for Pain      diphenhydrAMINE (BENADRYL) 25 Psychiatric: She has a normal mood and affect. Nursing note and vitals reviewed. BP (!) 162/81   Pulse 83   Temp 98 °F (36.7 °C)   Wt 111 lb (50.3 kg)   SpO2 97%   BMI 18.19 kg/m²     Assessment:      Diagnosis Orders   1. Acute suppurative otitis media of both ears without spontaneous rupture of tympanic membranes, recurrence not specified  amoxicillin-clavulanate (AUGMENTIN) 875-125 MG per tablet   2. Acute otitis externa of right ear, unspecified type  ciprofloxacin-dexamethasone (CIPRODEX) 0.3-0.1 % otic suspension     No results found for this visit on 04/04/19. Plan:       Exam findings and plan of care discussed at bedside including:    · Start Augmentin today as prescribed; administration and side effects reviewed. Encouraged that it be taken with food and a probiotic and examples give. · Supportive management discussed including: rest, hydration, OTC Tylenol or Ibuprofen as instructed on labelling. Warm compresses to ear to relieve pain. Elevate head of bed. Hot tea with honey and lemon for cough as needed. · Written instructions provided with AVS including Otitis Media, Augmentin  · To ER or call 911 if any difficulty breathing, shortness of breath, inability to swallow, hives, rash, facial/tongue swelling or temp greater than 103 degrees. · Follow up with PCP as needed if symptoms worsen or do not improve. · Warm ear drops before instillation by rubbing in palms of hand for few minutes. · Ear Drops should be instilled while in side lying position with affected ear up. · Gently pull ear lobe and move around to improve movement of drops into ear canal.  · Do not wear ear plugs or use hearing aids until pain has resolved. · Keep ears clean and dry, avoid getting water in the ears. · No swimming or submerging head in water for 7 to 10 days. · Ciprofloxacin ear drops, 4 drops into affected ear twice per day for 7 days.   · Tylenol or Ibuprofen OTC as directed on package for

## 2019-04-15 ENCOUNTER — OFFICE VISIT (OUTPATIENT)
Dept: PRIMARY CARE CLINIC | Age: 59
End: 2019-04-15
Payer: COMMERCIAL

## 2019-04-15 VITALS
OXYGEN SATURATION: 97 % | SYSTOLIC BLOOD PRESSURE: 139 MMHG | TEMPERATURE: 98.4 F | BODY MASS INDEX: 17.53 KG/M2 | DIASTOLIC BLOOD PRESSURE: 78 MMHG | HEART RATE: 84 BPM | WEIGHT: 107 LBS

## 2019-04-15 DIAGNOSIS — R19.7 DIARRHEA, UNSPECIFIED TYPE: Primary | ICD-10-CM

## 2019-04-15 PROCEDURE — 99213 OFFICE O/P EST LOW 20 MIN: CPT | Performed by: NURSE PRACTITIONER

## 2019-04-15 ASSESSMENT — ENCOUNTER SYMPTOMS
NAUSEA: 1
DIARRHEA: 1
SORE THROAT: 0
VOMITING: 0
SHORTNESS OF BREATH: 0
WHEEZING: 0
COUGH: 0

## 2019-04-15 NOTE — PROGRESS NOTES
[address] [facility] [phone]  Dept: [phone]  Dept Fax: [phone]     Zak Figueredo is a 63 y.o. female who presents to the [facility] in Care today for hermedical conditions/complaints as noted below. Zak Figueredo is c/o of Diarrhea (Patient presents today for diarrhea x4 days, patient states she has been eating food with her abx. Patient has been able to eat food and drink water, unsure if the diarrhea is due to abx)      HPI:     Diarrhea    This is a new problem. The current episode started in the past 7 days (Started on Thursday night with diarrhea after taking Augmentin x 1 week, stopped taking Augmentin. Denies vomiting but having nausea. Denies fever or chills. ). The problem occurs 2 to 4 times per day (Trying to eat but doesn't stay in. Has gone twice today, couple times yesterday. ). The stool consistency is described as watery (brown in color, no blood). Associated symptoms include weight loss (4 pounds). Pertinent negatives include no chills, coughing, fever, headaches, myalgias or vomiting. Exacerbated by: after eating. Risk factors include recent antibiotic use (Augmentin x 1 week). Treatments tried: Probiotic, pepto bismol and kaopectate. The treatment provided no relief. There is no history of bowel resection, inflammatory bowel disease, irritable bowel syndrome, malabsorption, a recent abdominal surgery or short gut syndrome.           Past Medical History:   Diagnosis Date    Allergic rhinitis     dust mites and nickel        Current Outpatient Medications   Medication Sig Dispense Refill    ondansetron (ZOFRAN) 4 MG tablet take 1 tablet by mouth every 4 to 6 hours if needed for nausea and vomiting  0    Probiotic Product (PROBIOTIC-10) CAPS Take by mouth daily      famotidine (PEPCID) 20 MG tablet Take 20 mg by mouth 2 times daily      Multiple Vitamin (MULTI-VITAMIN DAILY PO) Take by mouth      Ascorbic Acid (VITAMIN C) 250 MG tablet Take 250 mg by mouth daily      Cholecalciferol (VITAMIN D3) 2000 units CAPS Take by mouth      acetaminophen (TYLENOL) 325 MG tablet Take 325 mg by mouth every 6 hours as needed for Pain      diphenhydrAMINE (BENADRYL) 25 MG tablet Take 25 mg by mouth every 6 hours as needed for Itching       No current facility-administered medications for this visit. Allergies   Allergen Reactions    Latex     Other      Neoprene       Subjective:     Review of Systems   Constitutional: Positive for weight loss (4 pounds). Negative for appetite change, chills, diaphoresis, fatigue and fever. HENT: Negative for congestion and sore throat. Respiratory: Negative for cough, shortness of breath and wheezing. Gastrointestinal: Positive for diarrhea and nausea. Negative for vomiting. Musculoskeletal: Negative for myalgias. Skin: Negative for rash and wound. Neurological: Negative for dizziness, light-headedness and headaches. Objective:      Physical Exam   Constitutional: She is oriented to person, place, and time. Vital signs are normal. She appears well-developed and well-nourished. She is cooperative. She does not appear ill. No distress. HENT:   Head: Normocephalic and atraumatic. Right Ear: Hearing, tympanic membrane, external ear and ear canal normal. There is tenderness (slight erythema to external auditory canal). No swelling. No mastoid tenderness. Tympanic membrane is not injected, not erythematous and not bulging. No middle ear effusion. Left Ear: Hearing, tympanic membrane, external ear and ear canal normal. There is tenderness (slight erythema to external auditory canal.). No swelling. No mastoid tenderness. Tympanic membrane is not injected, not erythematous and not bulging. No middle ear effusion. Nose: Nose normal. Right sinus exhibits no maxillary sinus tenderness and no frontal sinus tenderness.  Left sinus exhibits no maxillary sinus tenderness and no for Resume all previous medications as directed. No orders of the defined types were placed in this encounter. · Encouraged to increase fluids, gatorade and electrolyte solutions, 6-8 glasses per day. Avoid soft drinks and fruit juices. Increase to soft diet as tolerated. Avoid spicy or high acidic foods. · Stool culture and stool for C-Diff - will call with results. · Meticulous handwashing to prevent spread of infection. · Patient instructions given for diarrhea. · Follow up with PCP or walk in care if symptoms worsen or do not improve. · To ER if unable to keep fluids down, severe abdominal pain, excessive bloody stool or rectal bleeding, prolonged symptoms greater than a week, weakness, any difficulty breathing, shortness of breath, inability to swallow, hives, rash, facial/tongue swelling or temp greater than 103 degrees. Suleman Wyman received counseling on the following healthy behaviors: nutrition and increased fluids. Patient given educational materials - see patient instructions. Discussed use,benefit, and side effects of prescribed medications. Treatment plan discussed atvisit. Continue routine health care follow up. All patient questions answered. Pt voiced understanding.       Electronically signed by ROGELIO Marsh CNP on 4/15/2019 at 4:32 PM

## 2019-04-15 NOTE — PATIENT INSTRUCTIONS
SURVEY:    You may be receiving a survey from [employer] regarding your visit today. Please complete the survey to enable us to provide the highest quality of care to you and your family. If you cannot score us as very good on any question, please call the office to discuss how we could have made your experience exceptional.     Thank you. Patient Education        Diarrhea: Care Instructions  Your Care Instructions    Diarrhea is loose, watery stools (bowel movements). The exact cause is often hard to find. Sometimes diarrhea is your body's way of getting rid of what caused an upset stomach. Viruses, food poisoning, and many medicines can cause diarrhea. Some people get diarrhea in response to emotional stress, anxiety, or certain foods. Almost everyone has diarrhea now and then. It usually isn't serious, and your stools will return to normal soon. The important thing to do is replace the fluids you have lost, so you can prevent dehydration. The doctor has checked you carefully, but problems can develop later. If you notice any problems or new symptoms, get medical treatment right away. Follow-up care is a key part of your treatment and safety. Be sure to make and go to all appointments, and call your doctor if you are having problems. It's also a good idea to know your test results and keep a list of the medicines you take. How can you care for yourself at home? · Watch for signs of dehydration, which means your body has lost too much water. Dehydration is a serious condition and should be treated right away. Signs of dehydration are:  ? Increasing thirst and dry eyes and mouth. ? Feeling faint or lightheaded. ? Darker urine, and a smaller amount of urine than normal.  · To prevent dehydration, drink plenty of fluids, enough so that your urine is light yellow or clear like water. Choose water and other caffeine-free clear liquids until you feel better.  If you have kidney, heart, or liver disease and have to limit fluids, talk with your doctor before you increase the amount of fluids you drink. · Begin eating small amounts of mild foods the next day, if you feel like it. ? Try yogurt that has live cultures of Lactobacillus. (Check the label.)  ? Avoid spicy foods, fruits, alcohol, and caffeine until 48 hours after all symptoms are gone. ? Avoid chewing gum that contains sorbitol. ? Avoid dairy products (except for yogurt with Lactobacillus) while you have diarrhea and for 3 days after symptoms are gone. · The doctor may recommend that you take over-the-counter medicine, such as loperamide (Imodium), if you still have diarrhea after 6 hours. Read and follow all instructions on the label. Do not use this medicine if you have bloody diarrhea, a high fever, or other signs of serious illness. Call your doctor if you think you are having a problem with your medicine. When should you call for help? Call 911 anytime you think you may need emergency care. For example, call if:    · You passed out (lost consciousness).     · Your stools are maroon or very bloody.    Call your doctor now or seek immediate medical care if:    · You are dizzy or lightheaded, or you feel like you may faint.     · Your stools are black and look like tar, or they have streaks of blood.     · You have new or worse belly pain.     · You have symptoms of dehydration, such as:  ? Dry eyes and a dry mouth. ? Passing only a little dark urine. ? Feeling thirstier than usual.     · You have a new or higher fever.    Watch closely for changes in your health, and be sure to contact your doctor if:    · Your diarrhea is getting worse.     · You see pus in the diarrhea.     · You are not getting better after 2 days (48 hours). Where can you learn more? Go to https://AdChoiceluis a.MedTel24. org and sign in to your Netli account. Enter F704 in the [employer] box to learn more about \"Diarrhea: Care Instructions. \"     If you do not have an account, please click on the \"Sign Up Now\" link. Current as of: September 23, 2018  Content Version: 11.9  © 20067815-8513 boarding pass, [employer]. Care instructions adapted under license by [facility] If you have questions about a medical condition or this instruction, always ask your healthcare professional. [address] any warranty or liability for your use of this information. · Encouraged to increase fluids, gatorade and electrolyte solutions, 6-8 glasses per day. Avoid soft drinks and fruit juices. Increase to soft diet as tolerated. Avoid spicy or high acidic foods. · Stool culture and stool for C-Diff - will call with results. · Meticulous handwashing to prevent spread of infection. · Patient instructions given for diarrhea. · Follow up with PCP or walk in care if symptoms worsen or do not improve. · To ER if unable to keep fluids down, severe abdominal pain, excessive bloody stool or rectal bleeding, prolonged symptoms greater than a week, weakness, any difficulty breathing, shortness of breath, inability to swallow, hives, rash, facial/tongue swelling or temp greater than 103 degrees.

## 2019-04-16 ENCOUNTER — HOSPITAL ENCOUNTER (OUTPATIENT)
Age: 59
Setting detail: SPECIMEN
Discharge: HOME | End: 2019-04-16
Payer: COMMERCIAL

## 2019-04-16 DIAGNOSIS — R19.7 DIARRHEA, UNSPECIFIED TYPE: ICD-10-CM

## 2019-04-16 LAB
C DIFF AG + TOXIN: NEGATIVE
SPECIMEN DESCRIPTION: NORMAL

## 2019-04-16 PROCEDURE — 87449 NOS EACH ORGANISM AG IA: CPT

## 2019-04-16 PROCEDURE — 87324 CLOSTRIDIUM AG IA: CPT

## 2019-04-16 PROCEDURE — 87506 IADNA-DNA/RNA PROBE TQ 6-11: CPT

## 2019-04-18 LAB
CAMPYLOBACTER PCR: NORMAL
E COLI ENTEROTOXIGENIC PCR: NORMAL
PLESIOMONAS SHIGELLOIDES PCR: NORMAL
SALMONELLA PCR: NORMAL
SHIGATOXIN GENE PCR: NORMAL
SHIGELLA SP PCR: NORMAL
SPECIMEN DESCRIPTION: NORMAL
VIBRIO PCR: NORMAL
YERSINIA ENTEROCOLITICA PCR: NORMAL

## 2019-11-08 ENCOUNTER — OFFICE VISIT (OUTPATIENT)
Dept: PRIMARY CARE CLINIC | Age: 59
End: 2019-11-08
Payer: COMMERCIAL

## 2019-11-08 VITALS
TEMPERATURE: 97.7 F | HEIGHT: 66 IN | DIASTOLIC BLOOD PRESSURE: 89 MMHG | WEIGHT: 107 LBS | SYSTOLIC BLOOD PRESSURE: 168 MMHG | BODY MASS INDEX: 17.19 KG/M2 | OXYGEN SATURATION: 97 % | HEART RATE: 78 BPM

## 2019-11-08 DIAGNOSIS — H66.90 CHRONIC OTITIS MEDIA, UNSPECIFIED OTITIS MEDIA TYPE: ICD-10-CM

## 2019-11-08 DIAGNOSIS — J01.10 ACUTE FRONTAL SINUSITIS, RECURRENCE NOT SPECIFIED: Primary | ICD-10-CM

## 2019-11-08 DIAGNOSIS — J20.9 BRONCHITIS, ACUTE, WITH BRONCHOSPASM: ICD-10-CM

## 2019-11-08 PROCEDURE — 99213 OFFICE O/P EST LOW 20 MIN: CPT | Performed by: NURSE PRACTITIONER

## 2019-11-08 RX ORDER — AMOXICILLIN AND CLAVULANATE POTASSIUM 875; 125 MG/1; MG/1
1 TABLET, FILM COATED ORAL 2 TIMES DAILY
Qty: 20 TABLET | Refills: 0 | Status: SHIPPED | OUTPATIENT
Start: 2019-11-08 | End: 2019-11-18

## 2019-11-08 RX ORDER — FLUTICASONE PROPIONATE 50 MCG
1 SPRAY, SUSPENSION (ML) NASAL DAILY
Qty: 1 BOTTLE | Refills: 0 | Status: SHIPPED | OUTPATIENT
Start: 2019-11-08 | End: 2019-12-04 | Stop reason: ALTCHOICE

## 2019-11-08 ASSESSMENT — ENCOUNTER SYMPTOMS
ALLERGIC/IMMUNOLOGIC NEGATIVE: 1
EYES NEGATIVE: 1
SORE THROAT: 0
SINUS PAIN: 1
RHINORRHEA: 1
COUGH: 1
WHEEZING: 1
GASTROINTESTINAL NEGATIVE: 1
SINUS PRESSURE: 1
SHORTNESS OF BREATH: 0

## 2019-11-14 ENCOUNTER — OFFICE VISIT (OUTPATIENT)
Dept: FAMILY MEDICINE CLINIC | Age: 59
End: 2019-11-14
Payer: COMMERCIAL

## 2019-11-14 ENCOUNTER — HOSPITAL ENCOUNTER (OUTPATIENT)
Age: 59
Discharge: HOME | End: 2019-11-16
Payer: COMMERCIAL

## 2019-11-14 ENCOUNTER — HOSPITAL ENCOUNTER (OUTPATIENT)
Dept: GENERAL RADIOLOGY | Age: 59
Discharge: HOME | End: 2019-11-16
Payer: COMMERCIAL

## 2019-11-14 VITALS
SYSTOLIC BLOOD PRESSURE: 122 MMHG | OXYGEN SATURATION: 95 % | TEMPERATURE: 97.8 F | DIASTOLIC BLOOD PRESSURE: 60 MMHG | BODY MASS INDEX: 16.95 KG/M2 | WEIGHT: 105 LBS | HEART RATE: 84 BPM

## 2019-11-14 DIAGNOSIS — R06.02 SOB (SHORTNESS OF BREATH): ICD-10-CM

## 2019-11-14 DIAGNOSIS — J18.9 COMMUNITY ACQUIRED PNEUMONIA OF LEFT LOWER LOBE OF LUNG: ICD-10-CM

## 2019-11-14 DIAGNOSIS — J18.9 COMMUNITY ACQUIRED PNEUMONIA OF LEFT LOWER LOBE OF LUNG: Primary | ICD-10-CM

## 2019-11-14 PROCEDURE — 71046 X-RAY EXAM CHEST 2 VIEWS: CPT

## 2019-11-14 PROCEDURE — 99213 OFFICE O/P EST LOW 20 MIN: CPT | Performed by: NURSE PRACTITIONER

## 2019-11-14 RX ORDER — PREDNISONE 20 MG/1
40 TABLET ORAL DAILY
Qty: 10 TABLET | Refills: 0 | Status: SHIPPED | OUTPATIENT
Start: 2019-11-14 | End: 2021-01-27 | Stop reason: SDUPTHER

## 2019-11-14 RX ORDER — ALBUTEROL SULFATE 90 UG/1
2 AEROSOL, METERED RESPIRATORY (INHALATION) 4 TIMES DAILY PRN
Qty: 1 INHALER | Refills: 2 | Status: SHIPPED | OUTPATIENT
Start: 2019-11-14 | End: 2021-05-06

## 2019-11-14 ASSESSMENT — ENCOUNTER SYMPTOMS
NAUSEA: 0
COUGH: 1
SHORTNESS OF BREATH: 0
ABDOMINAL PAIN: 1
VOMITING: 0
DIARRHEA: 1

## 2019-12-04 ENCOUNTER — OFFICE VISIT (OUTPATIENT)
Dept: ENT CLINIC | Age: 59
End: 2019-12-04
Payer: COMMERCIAL

## 2019-12-04 VITALS
SYSTOLIC BLOOD PRESSURE: 126 MMHG | WEIGHT: 106 LBS | HEIGHT: 66 IN | BODY MASS INDEX: 17.04 KG/M2 | OXYGEN SATURATION: 94 % | RESPIRATION RATE: 18 BRPM | HEART RATE: 85 BPM | DIASTOLIC BLOOD PRESSURE: 77 MMHG | TEMPERATURE: 98.2 F

## 2019-12-04 DIAGNOSIS — H60.8X3 CHRONIC ECZEMATOUS OTITIS EXTERNA OF BOTH EARS: Primary | ICD-10-CM

## 2019-12-04 PROCEDURE — 99243 OFF/OP CNSLTJ NEW/EST LOW 30: CPT | Performed by: OTOLARYNGOLOGY

## 2019-12-04 RX ORDER — FLUOCINOLONE ACETONIDE 0.11 MG/ML
4 OIL AURICULAR (OTIC) 2 TIMES DAILY
Qty: 1 BOTTLE | Refills: 3 | Status: SHIPPED | OUTPATIENT
Start: 2019-12-04 | End: 2020-01-03

## 2019-12-04 ASSESSMENT — ENCOUNTER SYMPTOMS
VOICE CHANGE: 0
CHEST TIGHTNESS: 0
VOMITING: 0
EYE DISCHARGE: 0
STRIDOR: 0
APNEA: 0
FACIAL SWELLING: 0
WHEEZING: 0
SINUS PRESSURE: 0
TROUBLE SWALLOWING: 0
COUGH: 0
RECTAL PAIN: 0
COLOR CHANGE: 0
CHOKING: 0
SHORTNESS OF BREATH: 0
EYE PAIN: 0
BLOOD IN STOOL: 0
SORE THROAT: 0
ANAL BLEEDING: 0
ABDOMINAL DISTENTION: 0
RHINORRHEA: 0
BACK PAIN: 0
EYE ITCHING: 0
EYE REDNESS: 0
NAUSEA: 0
SINUS PAIN: 0
CONSTIPATION: 0
DIARRHEA: 0
PHOTOPHOBIA: 0
ABDOMINAL PAIN: 0

## 2019-12-20 ENCOUNTER — TELEPHONE (OUTPATIENT)
Dept: FAMILY MEDICINE CLINIC | Age: 59
End: 2019-12-20

## 2019-12-20 DIAGNOSIS — H35.053: Primary | ICD-10-CM

## 2020-01-03 ENCOUNTER — HOSPITAL ENCOUNTER (OUTPATIENT)
Age: 60
Discharge: HOME | End: 2020-01-03
Payer: COMMERCIAL

## 2020-01-03 LAB
ALBUMIN SERPL-MCNC: 4.7 G/DL (ref 3.5–5.2)
ALBUMIN/GLOBULIN RATIO: ABNORMAL (ref 1–2.5)
ALP BLD-CCNC: 98 U/L (ref 35–104)
ALT SERPL-CCNC: 25 U/L (ref 5–33)
ANION GAP SERPL CALCULATED.3IONS-SCNC: 11 MMOL/L (ref 9–17)
AST SERPL-CCNC: 23 U/L
BILIRUB SERPL-MCNC: 0.58 MG/DL (ref 0.3–1.2)
BUN BLDV-MCNC: 17 MG/DL (ref 6–20)
BUN/CREAT BLD: 22 (ref 9–20)
CALCIUM SERPL-MCNC: 10.3 MG/DL (ref 8.6–10.4)
CHLORIDE BLD-SCNC: 100 MMOL/L (ref 98–107)
CHOLESTEROL/HDL RATIO: 3.5
CHOLESTEROL: 235 MG/DL
CO2: 26 MMOL/L (ref 20–31)
CREAT SERPL-MCNC: 0.76 MG/DL (ref 0.5–0.9)
ESTIMATED AVERAGE GLUCOSE: 105 MG/DL
GFR AFRICAN AMERICAN: >60 ML/MIN
GFR NON-AFRICAN AMERICAN: >60 ML/MIN
GFR SERPL CREATININE-BSD FRML MDRD: ABNORMAL ML/MIN/{1.73_M2}
GFR SERPL CREATININE-BSD FRML MDRD: ABNORMAL ML/MIN/{1.73_M2}
GLUCOSE BLD-MCNC: 100 MG/DL (ref 70–99)
HBA1C MFR BLD: 5.3 % (ref 4.8–5.9)
HDLC SERPL-MCNC: 68 MG/DL
LDL CHOLESTEROL: 134 MG/DL (ref 0–130)
PATIENT FASTING?: YES
POTASSIUM SERPL-SCNC: 4.7 MMOL/L (ref 3.7–5.3)
SODIUM BLD-SCNC: 137 MMOL/L (ref 135–144)
TOTAL PROTEIN: 7.9 G/DL (ref 6.4–8.3)
TRIGL SERPL-MCNC: 163 MG/DL
VLDLC SERPL CALC-MCNC: ABNORMAL MG/DL (ref 1–30)

## 2020-01-03 PROCEDURE — 36415 COLL VENOUS BLD VENIPUNCTURE: CPT

## 2020-01-03 PROCEDURE — 80061 LIPID PANEL: CPT

## 2020-01-03 PROCEDURE — 80053 COMPREHEN METABOLIC PANEL: CPT

## 2020-01-03 PROCEDURE — 83036 HEMOGLOBIN GLYCOSYLATED A1C: CPT

## 2020-01-06 ENCOUNTER — OFFICE VISIT (OUTPATIENT)
Dept: FAMILY MEDICINE CLINIC | Age: 60
End: 2020-01-06
Payer: COMMERCIAL

## 2020-01-06 VITALS
WEIGHT: 107 LBS | BODY MASS INDEX: 17.19 KG/M2 | DIASTOLIC BLOOD PRESSURE: 68 MMHG | SYSTOLIC BLOOD PRESSURE: 134 MMHG | HEIGHT: 66 IN | OXYGEN SATURATION: 97 % | HEART RATE: 87 BPM

## 2020-01-06 PROCEDURE — 99396 PREV VISIT EST AGE 40-64: CPT | Performed by: FAMILY MEDICINE

## 2020-01-06 ASSESSMENT — PATIENT HEALTH QUESTIONNAIRE - PHQ9
SUM OF ALL RESPONSES TO PHQ9 QUESTIONS 1 & 2: 0
2. FEELING DOWN, DEPRESSED OR HOPELESS: 0
SUM OF ALL RESPONSES TO PHQ QUESTIONS 1-9: 0
SUM OF ALL RESPONSES TO PHQ QUESTIONS 1-9: 0
1. LITTLE INTEREST OR PLEASURE IN DOING THINGS: 0

## 2020-01-06 NOTE — PROGRESS NOTES
smoking about 0.50 packs per day. She has never used smokeless tobacco.  Alcohol:      reports no history of alcohol use. Drug Use:  reports no history of drug use. Family History:     History reviewed. No pertinent family history. Review of Systems:     Positive and Negative as described in HPI    Review of Systems   Constitutional: Negative. HENT: Negative. Respiratory: Negative. Cardiovascular: Negative. Gastrointestinal: Negative. Genitourinary: Negative. Musculoskeletal: Negative. Skin: Negative. Neurological: Negative. Hematological: Negative. Psychiatric/Behavioral: Negative. Physical Exam:     Vitals:  /68   Pulse 87   Ht 5' 6\" (1.676 m)   Wt 107 lb (48.5 kg)   SpO2 97%   BMI 17.27 kg/m²   Physical Exam  Vitals signs and nursing note reviewed. Constitutional:       Appearance: She is well-developed. HENT:      Head: Normocephalic and atraumatic. Right Ear: Hearing and tympanic membrane normal.      Left Ear: Hearing and tympanic membrane normal.      Nose: No mucosal edema or rhinorrhea. Eyes:      Conjunctiva/sclera: Conjunctivae normal.      Pupils: Pupils are equal, round, and reactive to light. Neck:      Musculoskeletal: Neck supple. Thyroid: No thyroid mass or thyromegaly. Cardiovascular:      Rate and Rhythm: Normal rate and regular rhythm. Heart sounds: S1 normal and S2 normal. No murmur. Comments: No peripheral edema. Pulmonary:      Effort: Pulmonary effort is normal.      Breath sounds: Normal breath sounds. Abdominal:      General: Bowel sounds are normal.      Palpations: Abdomen is soft. Tenderness: There is no tenderness. Musculoskeletal:      Comments: Normal tone and bulk, normal gait. Lymphadenopathy:      Cervical: No cervical adenopathy. Skin:     General: Skin is warm and dry. Findings: No rash. Neurological:      Mental Status: She is alert and oriented to person, place, and time. Psychiatric:         Behavior: Behavior normal.         Data:     Lab Results   Component Value Date     01/03/2020    K 4.7 01/03/2020     01/03/2020    CO2 26 01/03/2020    BUN 17 01/03/2020    CREATININE 0.76 01/03/2020    GLUCOSE 100 01/03/2020    PROT 7.9 01/03/2020    LABALBU 4.7 01/03/2020    BILITOT 0.58 01/03/2020    ALKPHOS 98 01/03/2020    AST 23 01/03/2020    ALT 25 01/03/2020     No results found for: WBC, RBC, HGB, HCT, MCV, MCH, MCHC, RDW, PLT, MPV  No results found for: TSH  Lab Results   Component Value Date    CHOL 235 01/03/2020    HDL 68 01/03/2020    LABA1C 5.3 01/03/2020         Assessment & Plan:        Diagnosis Orders   1. Routine health maintenance     2. Visit for screening mammogram  [facility] DIGITAL SCREEN W CAD BILATERAL   3. Screening for colorectal cancer  Dara Santo MD, General Surgery, [facility]   Updating health maintenance. Reviewed recent labs. 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 Continue following with ophthalmology. Offered CT lung screening but patient wants to check insurance coverage first.        Requested Prescriptions      No prescriptions requested or ordered in this encounter       There are no Patient Instructions on file for this visit. Omaira Garibay received counseling on the following healthy behaviors: tobacco cessation  Reviewed prior labs and health maintenance. Continue current medications, diet and exercise. Discussed use, benefit, and side effects of prescribed medications. Barriers to medication compliance addressed. Patient given educational materials - see patient instructions. All patient questions answered. Patient voiced understanding.      Electronically signed by Kurt Scott DO on 1/7/2020 at 1:01 PM   [address]  Dept: [phone]

## 2020-01-07 ASSESSMENT — ENCOUNTER SYMPTOMS
RESPIRATORY NEGATIVE: 1
GASTROINTESTINAL NEGATIVE: 1

## 2020-01-20 ENCOUNTER — HOSPITAL ENCOUNTER (OUTPATIENT)
Dept: GENERAL RADIOLOGY | Age: 60
Discharge: HOME | End: 2020-01-22
Payer: COMMERCIAL

## 2020-01-20 ENCOUNTER — OFFICE VISIT (OUTPATIENT)
Dept: PRIMARY CARE CLINIC | Age: 60
End: 2020-01-20
Payer: COMMERCIAL

## 2020-01-20 ENCOUNTER — HOSPITAL ENCOUNTER (OUTPATIENT)
Age: 60
Discharge: HOME | End: 2020-01-22
Payer: COMMERCIAL

## 2020-01-20 VITALS
BODY MASS INDEX: 18.01 KG/M2 | HEART RATE: 82 BPM | WEIGHT: 108.1 LBS | SYSTOLIC BLOOD PRESSURE: 156 MMHG | HEIGHT: 65 IN | DIASTOLIC BLOOD PRESSURE: 89 MMHG

## 2020-01-20 PROCEDURE — 99213 OFFICE O/P EST LOW 20 MIN: CPT | Performed by: NURSE PRACTITIONER

## 2020-01-20 PROCEDURE — 73030 X-RAY EXAM OF SHOULDER: CPT

## 2020-01-20 ASSESSMENT — ENCOUNTER SYMPTOMS
DIARRHEA: 1
WHEEZING: 0
SORE THROAT: 0
SHORTNESS OF BREATH: 0
COUGH: 0
NAUSEA: 0
VOMITING: 0

## 2020-01-20 NOTE — PATIENT INSTRUCTIONS
SURVEY:    You may be receiving a survey from [employer] regarding your visit today. Please complete the survey to enable us to provide the highest quality of care to you and your family. If you cannot score us a very good on any question, please call the office to discuss how we could of made your experience a very good one. Thank you. Patient Education        Patient Education        Shoulder Pain: Care Instructions  Your Care Instructions    You can hurt your shoulder by using it too much during an activity, such as fishing or baseball. It can also happen as part of the everyday wear and tear of getting older. Shoulder injuries can be slow to heal, but your shoulder should get better with time. Your doctor may recommend a sling to rest your shoulder. If you have injured your shoulder, you may need testing and treatment. Follow-up care is a key part of your treatment and safety. Be sure to make and go to all appointments, and call your doctor if you are having problems. It's also a good idea to know your test results and keep a list of the medicines you take. How can you care for yourself at home? · Take pain medicines exactly as directed. ? If the doctor gave you a prescription medicine for pain, take it as prescribed. ? If you are not taking a prescription pain medicine, ask your doctor if you can take an over-the-counter medicine. ? Do not take two or more pain medicines at the same time unless the doctor told you to. Many pain medicines contain acetaminophen, which is Tylenol. Too much acetaminophen (Tylenol) can be harmful. · If your doctor recommends that you wear a sling, use it as directed. Do not take it off before your doctor tells you to. · Put ice or a cold pack on the sore area for 10 to 20 minutes at a time. Put a thin cloth between the ice and your skin. · If there is no swelling, you can put moist heat, a heating pad, or a warm cloth on your shoulder.  Some doctors suggest

## 2020-01-20 NOTE — PROGRESS NOTES
[address] [facility] WALK-IN CARE  [address] [facility] [address]  Dept: [phone]  Dept Fax: [phone]     Beatriz Conn is a 63 y.o. female who presents to the [facility] in Care today for hermedical conditions/complaints as noted below. Beatriz Conn is c/o of Shoulder Pain (left shoulder ache x 3, she thought she may have slept wrong, possible pulled muscle, she is not able to find a comfortable position, she did notice slight swelling and numbness, she does notice numbness and tingling after lowering her arm, she has tried tylenol and aleeeve, no improvement with the ache)      HPI:     Shoulder Pain    The pain is present in the left shoulder and neck. This is a new problem. The current episode started in the past 7 days (Woke up Friday afternoon with pain to left shoulder up in to left neck and has ached since. 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  ). There has been no history of extremity trauma. The problem occurs constantly (Constant ache 7-8/10, numbness to left shoulder if touching. If raises left arm, tingling down to fingertips. ). The problem has been gradually worsening. The quality of the pain is described as aching. The pain is at a severity of 8/10. The pain is severe. Associated symptoms include joint swelling (left neck and shoulder), a limited range of motion, numbness and tingling. Pertinent negatives include no fever. She has tried NSAIDS (Aleve, Icy Hot) for the symptoms. The treatment provided no relief. Family history does not include gout or rheumatoid arthritis. There is no history of diabetes, gout, osteoarthritis or rheumatoid arthritis.           Past Medical History:   Diagnosis Date    Allergic rhinitis     dust mites and nickel        Current Outpatient Medications   Medication Sig Dispense Refill    albuterol sulfate  (90 Base) MCG/ACT inhaler Inhale 2 puffs into the lungs 4 times daily as needed for Wheezing 1 Inhaler 2    Probiotic Product (PROBIOTIC-10) CAPS Take by mouth daily      famotidine (PEPCID) 20 MG tablet Take 20 mg by mouth 2 times daily      Multiple Vitamin (MULTI-VITAMIN DAILY PO) Take by mouth      Ascorbic Acid (VITAMIN C) 250 MG tablet Take 250 mg by mouth daily      Cholecalciferol (VITAMIN D3) 2000 units CAPS Take by mouth      acetaminophen (TYLENOL) 325 MG tablet Take 325 mg by mouth every 6 hours as needed for Pain      diphenhydrAMINE (BENADRYL) 25 MG tablet Take 25 mg by mouth every 6 hours as needed for Itching       No current facility-administered medications for this visit. Allergies   Allergen Reactions    Other      Neoprene       Subjective:     Review of Systems   Constitutional: Negative for appetite change, chills, diaphoresis, fatigue and fever. HENT: Negative for congestion and sore throat. Respiratory: Negative for cough, shortness of breath and wheezing. Gastrointestinal: Positive for diarrhea. Negative for nausea and vomiting. Musculoskeletal: Positive for joint swelling (left shoulder), myalgias and neck pain (has always had issues with neck). Negative for gout and neck stiffness. Skin: Negative for rash and wound. Neurological: Positive for tingling and numbness. Negative for dizziness and headaches. Objective:      Physical Exam  Vitals signs and nursing note reviewed. Constitutional:       General: She is not in acute distress. Appearance: She is well-developed. She is not ill-appearing or diaphoretic. HENT:      Head: Normocephalic and atraumatic. Right Ear: External ear normal.      Left Ear: External ear normal.   Eyes:      General: No scleral icterus. Right eye: No discharge. Left eye: No discharge. Conjunctiva/sclera: Conjunctivae normal.   Neck:      Musculoskeletal: Normal range of motion. No edema, erythema, neck rigidity, injury or spinous process tenderness. Cardiovascular:      Rate and Rhythm: Normal rate and regular rhythm.

## 2020-01-24 ENCOUNTER — HOSPITAL ENCOUNTER (OUTPATIENT)
Dept: MAMMOGRAPHY | Age: 60
Discharge: HOME | End: 2020-01-26
Payer: COMMERCIAL

## 2020-01-24 PROCEDURE — 77067 SCR MAMMO BI INCL CAD: CPT

## 2020-04-15 ENCOUNTER — E-VISIT (OUTPATIENT)
Dept: FAMILY MEDICINE CLINIC | Age: 60
End: 2020-04-15

## 2020-04-15 PROCEDURE — 99421 OL DIG E/M SVC 5-10 MIN: CPT | Performed by: FAMILY MEDICINE

## 2020-04-15 RX ORDER — AZITHROMYCIN 250 MG/1
250 TABLET, FILM COATED ORAL SEE ADMIN INSTRUCTIONS
Qty: 6 TABLET | Refills: 0 | Status: SHIPPED | OUTPATIENT
Start: 2020-04-15 | End: 2020-04-20

## 2020-04-15 ASSESSMENT — LIFESTYLE VARIABLES
SMOKING_YEARS: 30
SMOKING_STATUS: YES

## 2020-07-08 ENCOUNTER — OFFICE VISIT (OUTPATIENT)
Dept: ENT CLINIC | Age: 60
End: 2020-07-08
Payer: COMMERCIAL

## 2020-07-08 VITALS
HEIGHT: 66 IN | OXYGEN SATURATION: 97 % | SYSTOLIC BLOOD PRESSURE: 144 MMHG | DIASTOLIC BLOOD PRESSURE: 82 MMHG | TEMPERATURE: 97.2 F | HEART RATE: 93 BPM | WEIGHT: 106 LBS | BODY MASS INDEX: 17.04 KG/M2

## 2020-07-08 PROCEDURE — 99213 OFFICE O/P EST LOW 20 MIN: CPT | Performed by: OTOLARYNGOLOGY

## 2020-07-08 RX ORDER — FLUOCINOLONE ACETONIDE 0.11 MG/ML
OIL AURICULAR (OTIC)
COMMUNITY
Start: 2020-06-02 | End: 2021-01-04

## 2020-07-08 ASSESSMENT — ENCOUNTER SYMPTOMS
EYE REDNESS: 0
CHOKING: 0
WHEEZING: 0
EYE ITCHING: 0
VOMITING: 0
EYE PAIN: 0
DIARRHEA: 0
SINUS PAIN: 0
COUGH: 0
NAUSEA: 0
COLOR CHANGE: 0
TROUBLE SWALLOWING: 0
FACIAL SWELLING: 0
RHINORRHEA: 0
SINUS PRESSURE: 0
BACK PAIN: 1
CHEST TIGHTNESS: 0
CONSTIPATION: 0
ABDOMINAL DISTENTION: 0
PHOTOPHOBIA: 0
EYE DISCHARGE: 0
ANAL BLEEDING: 0
SORE THROAT: 0
ABDOMINAL PAIN: 0
BLOOD IN STOOL: 0
SHORTNESS OF BREATH: 0
RECTAL PAIN: 0
VOICE CHANGE: 0
STRIDOR: 0
APNEA: 0

## 2020-07-08 NOTE — PROGRESS NOTES
Review of Systems   Constitutional: Negative for activity change, appetite change, chills, diaphoresis, fatigue, fever and unexpected weight change. HENT: Positive for ear discharge and ear pain. Negative for congestion, dental problem, drooling, facial swelling, hearing loss, mouth sores, nosebleeds, postnasal drip, rhinorrhea, sinus pressure, sinus pain, sneezing, sore throat, tinnitus, trouble swallowing and voice change. Eyes: Negative for photophobia, pain, discharge, redness, itching and visual disturbance. Respiratory: Negative for apnea, cough, choking, chest tightness, shortness of breath, wheezing and stridor. Cardiovascular: Negative for chest pain, palpitations and leg swelling. Gastrointestinal: Negative for abdominal distention, abdominal pain, anal bleeding, blood in stool, constipation, diarrhea, nausea, rectal pain and vomiting. Endocrine: Negative for cold intolerance, heat intolerance, polydipsia, polyphagia and polyuria. Genitourinary: Negative for decreased urine volume, difficulty urinating, dyspareunia, dysuria, enuresis, flank pain, frequency, genital sores, hematuria, menstrual problem, pelvic pain, urgency, vaginal bleeding, vaginal discharge and vaginal pain. Musculoskeletal: Positive for back pain. Negative for arthralgias, gait problem, joint swelling, myalgias, neck pain and neck stiffness. Skin: Negative for color change, pallor, rash and wound. Allergic/Immunologic: Positive for environmental allergies. Negative for food allergies and immunocompromised state. Neurological: Negative for dizziness, tremors, seizures, syncope, facial asymmetry, speech difficulty, weakness, light-headedness, numbness and headaches. Hematological: Negative for adenopathy. Does not bruise/bleed easily.    Psychiatric/Behavioral: Negative for agitation, behavioral problems, confusion, decreased concentration, dysphoric mood, hallucinations, self-injury, sleep disturbance and suicidal ideas. The patient is not nervous/anxious and is not hyperactive.

## 2020-07-08 NOTE — PROGRESS NOTES
[facility] [address] [place] [address] [facility]  [address] [address]  Dept: [phone]  MD Rosario Sexton 62 y.o. female     Patient presents with a chief complaint of 6 Month Follow-Up (Patient is present today for 6 month follow up- chronic eczematous otitis externa of both ears. Patient c/o irritation with the ears. She mentioned today the pain has not been bad. )       BP (!) 144/82 (Site: Right Upper Arm, Position: Sitting, Cuff Size: Medium Adult)   Pulse 93   Temp 97.2 °F (36.2 °C) (Infrared)   Ht 5' 6\" (1.676 m)   Wt 106 lb (48.1 kg)   LMP  (LMP Unknown)   SpO2 97%   Breastfeeding No   BMI 17.11 kg/m²       History of Presenting Illness: The patient/caregiver reports a history of complaint with the following features: Onset: itching recurred last week, but has responded well to Dermotic drops again  Timing: occurs sporadically  Duration: over last week  Quality: left ear itching  Location: left ear  Severity: pain none  Risk factors: eczema of ear canals  Alleviating factors: relief with prior medication  Aggravating factors: nothing makes it worse  Associated factors: wears ear plugs in workplace    Review of systems covering 10 systems is reviewed as staff entry in other note and pertinent positives and negatives noted.     Past Medical History:   Diagnosis Date    Allergic rhinitis     dust mites and nickel       Current Outpatient Medications:     fluocinolone (DERMOTIC) 0.01 % OIL oil, place 4 drops INTO AFFECTED EAR(S) twice a day, Disp: , Rfl:     albuterol sulfate  (90 Base) MCG/ACT inhaler, Inhale 2 puffs into the lungs 4 times daily as needed for Wheezing, Disp: 1 Inhaler, Rfl: 2    Probiotic Product (PROBIOTIC-10) CAPS, Take by mouth daily, Disp: , Rfl:     famotidine (PEPCID) 20 MG tablet, Take 20 mg by mouth 2 times daily, Disp: , Rfl:     Multiple Vitamin (MULTI-VITAMIN DAILY PO), Take by mouth, Disp: , Rfl:     Ascorbic Acid (VITAMIN C) 250 MG tablet, Take 250 mg by mouth daily, Disp: , Rfl:     Cholecalciferol (VITAMIN D3) 2000 units CAPS, Take by mouth, Disp: , Rfl:     acetaminophen (TYLENOL) 325 MG tablet, Take 325 mg by mouth every 6 hours as needed for Pain, Disp: , Rfl:     diphenhydrAMINE (BENADRYL) 25 MG tablet, Take 25 mg by mouth every 6 hours as needed for Itching, Disp: , Rfl:    Allergies   Allergen Reactions    Other      Neoprene      Past Surgical History:   Procedure Laterality Date   [address]    [address] [facility] Marital status:       Spouse name: Not on file    Number of children: Not on file    Years of education: Not on file    Highest education level: Not on file   Occupational History    Not on file   Social Needs    Financial resource strain: Not on file    Food insecurity     Worry: Not on file     Inability: Not on file    Transportation needs     Medical: Not on file     Non-medical: Not on file   Tobacco Use    Smoking status: Current Every Day Smoker     Packs/day: 0.50    Smokeless tobacco: Never Used   Substance and Sexual Activity    Alcohol use: No    Drug use: No    Sexual activity: Not on file   Lifestyle    Physical activity     Days per week: Not on file     Minutes per session: Not on file    Stress: Not on file   Relationships    Social connections     Talks on phone: Not on file     Gets together: Not on file     Attends Judaism service: Not on file     Active member of club or organization: Not on file     Attends meetings of clubs or organizations: Not on file     Relationship status: Not on file    Intimate partner violence     Fear of current or ex partner: Not on file     Emotionally abused: Not on file     Physically abused: Not on file     Forced sexual activity: Not on file   Other Topics Concern    Not on file   Social History Narrative    Not on file     No family history on file.     PHYSICAL EXAM:    The patient was examined today 7/8/2020 with findings as follows:    CONSTITUTIONAL:    General Appearance: well-appearing, nontoxic, alert, no acute distress     Communication: understanding at normal conversational tones, normal voicing, speech intelligible    HEAD/FACE:    Head: atraumatic, normocephalic, no lesions    Facial Inspection: no lesions, healthy skin    Facial Strength: motor strength normal, symmetric strength, symmetric movement, motor strength    Sinuses: no sinus tenderness    Salivary Glands: no enlargements of parotid glands, no tenderness of parotid glands, no masses of parotid glands, clear salivary flow on palpation from Stensen's ducts, no duct stones of Stensen's duct, no enlargement of submandibular glands, no tenderness of submandibular glands, no masses of submandibular glands, clear salivary flow from [place]remont's ducts, no stones of Noemy's ducts    Temporomandibular Joint: no crepitus with motion, no tenderness on palpation, no trismus, motion symmetric    EYES:    Pupils: PERRLA, extra-ocular movements intact, no nystagmus, sclera white, no redness of eyes, no watering of eyes    EARS:    Bilateral External Ears: no pits, no tags    Right External Ear: normally formed, no lesions, no mastoid tenderness    Left External Ear: normally formed, no lesions, no mastoid tenderness    Right External Auditory Canal: dry flaky skin, debris impacted on tympanic membrane, removed with suction, no obstructing cerumen, no discharge    Left External Auditory Canal: dry flaky skin, debris impacted on tympanic membrane, removed with suction, no obstructing cerumen, no discharge    Right Tympanic Membrane: normal landmarks, translucent, mobile to pneumatic otoscopy, no perforation    Left Tympanic Membrane: normal landmarks, translucent, mobile to pneumatic otoscopy, no perforation    Hearing: intact to spoken voice, intact to finger rub    SKIN:    General Appearance: no lesions, warm and dry, normal turgor, no bruising    NEUROLOGICAL SYSTEM:    Orientation: oriented to time, oriented to place, oriented to person      PSYCHIATRIC:    Mood and affect: normal mood, normal affect          Assessment and Plan:    The patient and/or caregiver is advised on the use of any prescribed medication. The avoidance of water exposure to the ear and mechanical trauma such as the use of cotton tipped swabs or the insertion of objects into the ear is advised. The patient and/or caregiver is to notify the office if no improvement or worsening of symptoms is noted prior to the scheduled follow-up for sooner evaluation. The patient and/or caregiver is able to state an understanding of these recommendations and is agreeable to the treatment plan. She reports good symptom control when she uses the Dermotic drops and ongoing weekly application is advised. Impacted debris is removed with periodic return for cleaning offered. Diagnosis Orders   1. Chronic eczematous otitis externa of both ears        No follow-ups on file. The patient and/or caregiver is to notify the office if no improvement or worsening of symptoms is noted prior to the scheduled follow-up for sooner evaluation. The patient and/or caregiver is able to state an understanding of these recommendations and is agreeable to the treatment plan. --Alba Machado MD on 7/8/2020 at 3:40 PM    An electronic signature was used to authenticate this note.

## 2020-09-30 ENCOUNTER — HOSPITAL ENCOUNTER (OUTPATIENT)
Age: 60
Setting detail: SPECIMEN
Discharge: HOME | End: 2020-09-30
Payer: COMMERCIAL

## 2020-09-30 ENCOUNTER — OFFICE VISIT (OUTPATIENT)
Dept: PRIMARY CARE CLINIC | Age: 60
End: 2020-09-30
Payer: COMMERCIAL

## 2020-09-30 VITALS
TEMPERATURE: 97.8 F | BODY MASS INDEX: 17.11 KG/M2 | OXYGEN SATURATION: 97 % | WEIGHT: 106 LBS | HEART RATE: 81 BPM | SYSTOLIC BLOOD PRESSURE: 146 MMHG | DIASTOLIC BLOOD PRESSURE: 87 MMHG

## 2020-09-30 PROCEDURE — C9803 HOPD COVID-19 SPEC COLLECT: HCPCS

## 2020-09-30 PROCEDURE — U0003 INFECTIOUS AGENT DETECTION BY NUCLEIC ACID (DNA OR RNA); SEVERE ACUTE RESPIRATORY SYNDROME CORONAVIRUS 2 (SARS-COV-2) (CORONAVIRUS DISEASE [COVID-19]), AMPLIFIED PROBE TECHNIQUE, MAKING USE OF HIGH THROUGHPUT TECHNOLOGIES AS DESCRIBED BY CMS-2020-01-R: HCPCS

## 2020-09-30 PROCEDURE — 99213 OFFICE O/P EST LOW 20 MIN: CPT | Performed by: NURSE PRACTITIONER

## 2020-09-30 RX ORDER — FLUTICASONE PROPIONATE 50 MCG
1 SPRAY, SUSPENSION (ML) NASAL DAILY
Qty: 1 BOTTLE | Refills: 0 | Status: SHIPPED | OUTPATIENT
Start: 2020-09-30 | End: 2021-03-26 | Stop reason: ALTCHOICE

## 2020-09-30 RX ORDER — DEXTROMETHORPHAN HBR AND PYRILAMINE MALEATE 30; 30 MG/1; MG/1
1 TABLET ORAL
Qty: 45 TABLET | Refills: 0 | Status: SHIPPED | OUTPATIENT
Start: 2020-09-30 | End: 2020-10-14

## 2020-09-30 ASSESSMENT — ENCOUNTER SYMPTOMS
EYES NEGATIVE: 1
RHINORRHEA: 1
SORE THROAT: 0
ALLERGIC/IMMUNOLOGIC NEGATIVE: 1
DIARRHEA: 0
COUGH: 1
NAUSEA: 1
SHORTNESS OF BREATH: 1

## 2020-09-30 NOTE — PROGRESS NOTES
Dee Oh  1960    Chief Complaint   Patient presents with    Cough     Patient c/o cough, fatigue, head congestion and headache. Patient states the fatigue began 1-2 weeks ago, the cough began 4-5 days ago and then headache and congestion started yesterday. Respiratory Symptoms:  Patient complains of 1 week(s) history of myalgias/arthralgias, headache, ear pain: on the right, nasal congestion, post nasal drip, facial pain/sinus pressure: bilateral frontal, sneezing, shortness of breath, wheezing/chest tightness, chest pain: on the left side she thinks may be heart burn, non-productive cough, lightheadedness, neck pain, nausea without vomiting and fatigue. Symptoms have been worsening with time. She denies any other symptoms . Relevant PMH: No pertinent PMH. Smoking history:  She  reports that she has been smoking. She has been smoking about 0.50 packs per day. She has never used smokeless tobacco.     She has had no known ill contacts. Treatment to date: oral decongestant, which has been  somewhat effective. Travel screen completed:   Yes

## 2020-09-30 NOTE — LETTER
[facility] [address], [address]  [facility][phone]  Fax: [phone]      9/30/2020        To whom it may concern:     Randolph Toussaint  was tested today for COVID. Randolph Pizanoper may not return to work until test results given. Patient may  Return to work after negative test results given, 10 days after symptom onset, and 24 hours after last fever. If test results positive the health   Department will be monitoring symptoms and provide return to work date. Tana MERCADO-CNP

## 2020-09-30 NOTE — PATIENT INSTRUCTIONS
SURVEY:    You may be receiving a survey from [employer] regarding your visit today. Please complete the survey to enable us to provide the highest quality of care to you and your family. If you cannot score us a very good on any question, please call the office to discuss how we could of made your experience a very good one. Thank you.

## 2020-09-30 NOTE — PROGRESS NOTES
[address] W[facility]  [id number] [place] [id number]  Dept: [phone]  Dept Fax: [phone]    Ulisses Song is a 62 y.o. female who presents to the [address] [place] [address] in Care today for her medical conditions/complaints as noted below. Ulisses Song is c/o of Cough (Patient c/o cough, fatigue, head congestion and headache. Patient states the fatigue began 1-2 weeks ago, the cough began 4-5 days ago and then headache and congestion started yesterday.)      HPI:    Ulisses Song is a 62 y.o. female who presents with  Fatigue for 2 weeks. She has been feeling like she has been going to be getting sick for the last couple days and it has worsened. She is having headache, congestion, and cough. Cough is mainly dry. Had a sore throat 1 day and then went away. Having some body aches. Denies fever. Has had some nausea and denies vomiting and diarrhea. She has been taking Tylenol cold and flu with minimal relief. Past Medical History:   Diagnosis Date    Allergic rhinitis     dust mites and nickel        Current Outpatient Medications   Medication Sig Dispense Refill    Dextromethorphan-Pyrilamine (CAPRON DMT) 30-30 MG TABS Take 1 tablet by mouth every 6-8 hours as needed (cough and congestion) 45 tablet 0    fluticasone (FLONASE) 50 MCG/ACT nasal spray 1 spray by Nasal route daily for 7 days .  1 Bottle 0    fluocinolone (DERMOTIC) 0.01 % OIL oil place 4 drops INTO AFFECTED EAR(S) twice a day      albuterol sulfate  (90 Base) MCG/ACT inhaler Inhale 2 puffs into the lungs 4 times daily as needed for Wheezing 1 Inhaler 2    Probiotic Product (PROBIOTIC-10) CAPS Take by mouth daily      famotidine (PEPCID) 20 MG tablet Take 20 mg by mouth 2 times daily      Multiple Vitamin (MULTI-VITAMIN DAILY PO) Take by mouth      Ascorbic Acid (VITAMIN C) 250 MG tablet Take 250 mg by mouth daily      Cholecalciferol (VITAMIN D3) 2000 units CAPS Take by mouth      acetaminophen (TYLENOL) 325 MG tablet Take 325 mg by mouth every 6 hours as needed for Pain      diphenhydrAMINE (BENADRYL) 25 MG tablet Take 25 mg by mouth every 6 hours as needed for Itching       No current facility-administered medications for this visit. Allergies   Allergen Reactions    Other      Neoprene       Subjective:      Review of Systems   Constitutional: Positive for appetite change and fatigue. Negative for chills, diaphoresis and fever. HENT: Positive for congestion, ear pain (last week but has gone away) and rhinorrhea. Negative for sore throat. Eyes: Negative. Respiratory: Positive for cough and shortness of breath. Cardiovascular: Negative. Gastrointestinal: Positive for nausea. Negative for diarrhea. Endocrine: Negative. Genitourinary: Negative. Musculoskeletal: Positive for myalgias. Skin: Negative. Allergic/Immunologic: Negative. Neurological: Positive for headaches. Hematological: Negative. Psychiatric/Behavioral: Negative. Objective:     Physical Exam  Vitals signs and nursing note reviewed. Constitutional:       Appearance: Normal appearance. HENT:      Head: Normocephalic. Right Ear: External ear normal.      Left Ear: External ear normal.      Nose: Rhinorrhea present. Rhinorrhea is clear. Right Turbinates: Swollen. Left Turbinates: Swollen. Mouth/Throat:      Lips: Pink. Mouth: Mucous membranes are moist.      Pharynx: Posterior oropharyngeal erythema present. Eyes:      Conjunctiva/sclera: Conjunctivae normal.      Pupils: Pupils are equal, round, and reactive to light. Neck:      Musculoskeletal: Normal range of motion. Cardiovascular:      Rate and Rhythm: Normal rate and regular rhythm. Heart sounds: Normal heart sounds. Pulmonary:      Effort: Pulmonary effort is normal.      Breath sounds: Normal breath sounds. Musculoskeletal: Normal range of motion.    Lymphadenopathy:

## 2020-10-03 LAB — SARS-COV-2, NAA: NOT DETECTED

## 2020-10-07 ENCOUNTER — OFFICE VISIT (OUTPATIENT)
Dept: FAMILY MEDICINE CLINIC | Age: 60
End: 2020-10-07
Payer: COMMERCIAL

## 2020-10-07 VITALS
BODY MASS INDEX: 17.11 KG/M2 | SYSTOLIC BLOOD PRESSURE: 138 MMHG | DIASTOLIC BLOOD PRESSURE: 88 MMHG | WEIGHT: 106 LBS | TEMPERATURE: 98.4 F | HEART RATE: 88 BPM | OXYGEN SATURATION: 98 %

## 2020-10-07 PROCEDURE — 99213 OFFICE O/P EST LOW 20 MIN: CPT | Performed by: STUDENT IN AN ORGANIZED HEALTH CARE EDUCATION/TRAINING PROGRAM

## 2020-10-07 RX ORDER — AMOXICILLIN 875 MG/1
875 TABLET, COATED ORAL 2 TIMES DAILY
Qty: 14 TABLET | Refills: 0 | Status: SHIPPED | OUTPATIENT
Start: 2020-10-07 | End: 2020-10-14

## 2020-10-07 ASSESSMENT — ENCOUNTER SYMPTOMS
BACK PAIN: 0
NAUSEA: 0
SINUS PRESSURE: 1
DIARRHEA: 0
WHEEZING: 0
SORE THROAT: 0
RHINORRHEA: 0
SINUS PAIN: 1
VOMITING: 0
SHORTNESS OF BREATH: 0
COUGH: 1
ABDOMINAL PAIN: 0

## 2020-10-07 NOTE — PROGRESS NOTES
Dextromethorphan-Pyrilamine (CAPRON DMT) 30-30 MG TABS Take 1 tablet by mouth every 6-8 hours as needed (cough and congestion) 9/30/20 10/14/20 Yes ROGELIO Reardon CNP   fluticasone (FLONASE) 50 MCG/ACT nasal spray 1 spray by Nasal route daily for 7 days . 9/30/20 10/7/20 Yes ROGELIO Reardon CNP   fluocinolone (DERMOTIC) 0.01 % OIL oil place 4 drops INTO AFFECTED EAR(S) twice a day 6/2/20  Yes Historical Provider, MD   Probiotic Product (PROBIOTIC-10) CAPS Take by mouth daily   Yes Historical Provider, MD   famotidine (PEPCID) 20 MG tablet Take 20 mg by mouth 2 times daily   Yes Historical Provider, MD   Multiple Vitamin (MULTI-VITAMIN DAILY PO) Take by mouth   Yes Historical Provider, MD   Ascorbic Acid (VITAMIN C) 250 MG tablet Take 250 mg by mouth daily   Yes Historical Provider, MD   Cholecalciferol (VITAMIN D3) 2000 units CAPS Take by mouth   Yes Historical Provider, MD   acetaminophen (TYLENOL) 325 MG tablet Take 325 mg by mouth every 6 hours as needed for Pain   Yes Historical Provider, MD   diphenhydrAMINE (BENADRYL) 25 MG tablet Take 25 mg by mouth every 6 hours as needed for Itching   Yes Historical Provider, MD   albuterol sulfate  (90 Base) MCG/ACT inhaler Inhale 2 puffs into the lungs 4 times daily as needed for Wheezing  Patient not taking: Reported on 10/7/2020 11/14/19   ROGELIO Lehman CNP        Allergies: Other    Social History:     Tobacco:    reports that she has been smoking. She has a 15.00 pack-year smoking history. She has never used smokeless tobacco.  Alcohol:      reports no history of alcohol use. Drug Use:  reports no history of drug use. Family History:     No family history on file. Review of Systems:         Review of Systems   Constitutional: Negative for chills and fever. HENT: Positive for congestion, sinus pressure and sinus pain. Negative for rhinorrhea, sneezing and sore throat. Respiratory: Positive for cough.  Negative 01/03/2020     01/03/2020    CO2 26 01/03/2020    BUN 17 01/03/2020    CREATININE 0.76 01/03/2020    GLUCOSE 100 01/03/2020    PROT 7.9 01/03/2020    LABALBU 4.7 01/03/2020    BILITOT 0.58 01/03/2020    ALKPHOS 98 01/03/2020    AST 23 01/03/2020    ALT 25 01/03/2020     No results found for: WBC, RBC, HGB, HCT, MCV, MCH, MCHC, RDW, PLT, MPV  No results found for: TSH  Lab Results   Component Value Date    CHOL 235 01/03/2020    HDL 68 01/03/2020    LABA1C 5.3 01/03/2020          Assessment & Plan        Diagnosis Orders   1. Nasal sinus congestion     2. Acute non-recurrent maxillary sinusitis  amoxicillin (AMOXIL) 875 MG tablet   3. Chronic eczematous otitis externa of both ears         1. Patient's history is suggestive of maxillary sinusitis, of bacterial origin, especially the tender bilateral maxillary sinuses. The fact that this is been going on for more than 7 to 10 days is also suggestive of bacterial origin, rather than viral.  Encourage patient to continue symptomatic therapies at home with previous therapies, in addition to amoxicillin. The patient I had a long discussion about starting amoxicillin. We discussed its mechanism of action, intended goals, adverse effects, as well as common side effects. They were able to verbalize understanding, and repeat plan back to me. Follow-up as needed for this problem    3. Encourage patient to continue using Dermotic drops. Completed Refills   Requested Prescriptions     Signed Prescriptions Disp Refills    amoxicillin (AMOXIL) 875 MG tablet 14 tablet 0     Sig: Take 1 tablet by mouth 2 times daily for 7 days     Return in 1 year (on 10/7/2021), or if symptoms worsen or fail to improve, for Well Visit.   Orders Placed This Encounter   Medications    amoxicillin (AMOXIL) 875 MG tablet     Sig: Take 1 tablet by mouth 2 times daily for 7 days     Dispense:  14 tablet     Refill:  0     No orders of the defined types were placed in this encounter. Patient Instructions   You likely have a bacterial sinus infection. Since it's been going on for a long time, I think an antibiotic would do well for you. Please take amoxicillin twice a day for 7 days. Take 2 probiotic capsules twice a day for 7 days then return to once daily. Thank you for coming to see me today! It was wonderful to meet you! Please give me a call if you have any other questions or problems, and I will see you at your next visit! Dr. Lacy Antonio:    Mary Willson may be receiving a survey from [employer] regarding your visit today. Please complete the survey to enable us to provide the highest quality of care to you and your family. If you cannot score us a very good on any question, please call the office to discuss how we could of made your experience a very good one. Thank you. Electronically signed by Trey Becerril DO on 10/7/2020 at 10:31 AM           Completed Refills   Requested Prescriptions     Signed Prescriptions Disp Refills    amoxicillin (AMOXIL) 875 MG tablet 14 tablet 0     Sig: Take 1 tablet by mouth 2 times daily for 7 days         Jun Watson received counseling on the following healthy behaviors: medication adherence  Reviewed prior labs and health maintenance. Continue current medications, diet and exercise. Discussed use, benefit, and side effects of prescribed medications. Barriers to medication compliance addressed. Patient given educational materials - see patient instructions. All patient questions answered. Patient voiced understanding.

## 2020-10-07 NOTE — PATIENT INSTRUCTIONS
You likely have a bacterial sinus infection. Since it's been going on for a long time, I think an antibiotic would do well for you. Please take amoxicillin twice a day for 7 days. Take 2 probiotic capsules twice a day for 7 days then return to once daily. Thank you for coming to see me today! It was wonderful to meet you! Please give me a call if you have any other questions or problems, and I will see you at your next visit! Dr. Raimundo Colby:    Mere Arellano may be receiving a survey from [employer] regarding your visit today. Please complete the survey to enable us to provide the highest quality of care to you and your family. If you cannot score us a very good on any question, please call the office to discuss how we could of made your experience a very good one. Thank you.

## 2021-01-27 ENCOUNTER — OFFICE VISIT (OUTPATIENT)
Dept: FAMILY MEDICINE CLINIC | Age: 61
End: 2021-01-27
Payer: COMMERCIAL

## 2021-01-27 VITALS
HEIGHT: 66 IN | DIASTOLIC BLOOD PRESSURE: 74 MMHG | SYSTOLIC BLOOD PRESSURE: 170 MMHG | OXYGEN SATURATION: 99 % | WEIGHT: 107 LBS | HEART RATE: 90 BPM | BODY MASS INDEX: 17.19 KG/M2

## 2021-01-27 DIAGNOSIS — M75.82 ROTATOR CUFF TENDONITIS, LEFT: Primary | ICD-10-CM

## 2021-01-27 DIAGNOSIS — I10 ESSENTIAL HYPERTENSION: ICD-10-CM

## 2021-01-27 PROCEDURE — 99214 OFFICE O/P EST MOD 30 MIN: CPT | Performed by: FAMILY MEDICINE

## 2021-01-27 RX ORDER — PREDNISONE 20 MG/1
40 TABLET ORAL DAILY
Qty: 10 TABLET | Refills: 0 | Status: SHIPPED | OUTPATIENT
Start: 2021-01-27 | End: 2021-02-01

## 2021-01-27 RX ORDER — HYDROCHLOROTHIAZIDE 25 MG/1
25 TABLET ORAL EVERY MORNING
Qty: 30 TABLET | Refills: 5 | Status: ON HOLD | OUTPATIENT
Start: 2021-01-27 | End: 2021-04-21 | Stop reason: HOSPADM

## 2021-01-27 SDOH — ECONOMIC STABILITY: FOOD INSECURITY: WITHIN THE PAST 12 MONTHS, YOU WORRIED THAT YOUR FOOD WOULD RUN OUT BEFORE YOU GOT MONEY TO BUY MORE.: NEVER TRUE

## 2021-01-27 SDOH — ECONOMIC STABILITY: FOOD INSECURITY: WITHIN THE PAST 12 MONTHS, THE FOOD YOU BOUGHT JUST DIDN'T LAST AND YOU DIDN'T HAVE MONEY TO GET MORE.: NEVER TRUE

## 2021-01-27 SDOH — ECONOMIC STABILITY: TRANSPORTATION INSECURITY
IN THE PAST 12 MONTHS, HAS THE LACK OF TRANSPORTATION KEPT YOU FROM MEDICAL APPOINTMENTS OR FROM GETTING MEDICATIONS?: NOT ASKED

## 2021-01-27 ASSESSMENT — PATIENT HEALTH QUESTIONNAIRE - PHQ9
SUM OF ALL RESPONSES TO PHQ QUESTIONS 1-9: 0
1. LITTLE INTEREST OR PLEASURE IN DOING THINGS: 0
SUM OF ALL RESPONSES TO PHQ9 QUESTIONS 1 & 2: 0
SUM OF ALL RESPONSES TO PHQ QUESTIONS 1-9: 0
SUM OF ALL RESPONSES TO PHQ QUESTIONS 1-9: 0

## 2021-01-27 NOTE — LETTER
[facility] LILA Guevara [address] [place] [id number]  Phone: [phone]  Fax: Matt 106, DO        January 27, 2021     Patient: Amy Figueroa   YOB: 1960   Date of Visit: 1/27/2021       To Whom It May Concern: It is my medical opinion that Amy Figueroa is unable to work 1/28 and 1/29 due to a non-contagious medical condition. If you have any questions or concerns, please don't hesitate to call.     Sincerely,        Suki Nazario, DO

## 2021-01-27 NOTE — PROGRESS NOTES
Name: Elaine Smrat  : 1960         Chief Complaint:     Chief Complaint   Patient presents with    Shoulder Pain     left shoulder pain for 2-3 weeks. hurts into left upper arm and into left side neck. decreased ROM. History of Present Illness:      Elaine Smart is a 62 y.o.  female who presents with Shoulder Pain (left shoulder pain for 2-3 weeks. hurts into left upper arm and into left side neck. decreased ROM. )      HPI     C/o 2-3 wks of constant toothache pain in L shoulder with any activity. Better with rest. To sleep comfortably she has to straighten the elbow and hold the shoulder a little anterior. The pain doesn't typically radiate past the elbow but sometimes she feels like she has to crack the L thumb, which seems to be related to the rest of this and does improve with cracking it (pulling on the thumb). 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 Takes tylenol when she gets home from work and 1 tylenol PM to help her sleep. Wakes up a couple hrs later and takes 2 advil gel caps. Muscle rubs don't help. Elevated blood pressure but no symptoms, feels okay aside from the shoulder. Has never been on blood pressure meds in the past.    Past Medical History:     Past Medical History:   Diagnosis Date    Allergic rhinitis     dust mites and nickel    Essential hypertension 2021        Past Surgical History:     Past Surgical History:   Procedure Laterality Date   [address]    2nd in         Medications:       Prior to Admission medications    Medication Sig Start Date End Date Taking?  Authorizing Provider   predniSONE (DELTASONE) 20 MG tablet Take 2 tablets by mouth daily for 5 days 21 Yes Lory JUAREZ Jair Dunne,    hydroCHLOROthiazide (HYDRODIURIL) 25 MG tablet Take 1 tablet by mouth every morning 1/27/21  Yes Angelica Simon,    fluocinolone (DERMOTIC) 0.01 % OIL oil Put 4 drops into the affected ear(s) twice daily x 2 weeks. After that can decrease use to 1-2 times a week for maintenance. 1/4/21  Yes AGNES Harrell   fluticasone (FLONASE) 50 MCG/ACT nasal spray 1 spray by Nasal route daily for 7 days . 9/30/20 1/27/21 Yes ROGELIO Patricio - CNP   albuterol sulfate  (90 Base) MCG/ACT inhaler Inhale 2 puffs into the lungs 4 times daily as needed for Wheezing 11/14/19  Yes Cielo ROGELIO Powell - CNP   Probiotic Product (PROBIOTIC-10) CAPS Take by mouth daily   Yes Historical Provider, MD   famotidine (PEPCID) 20 MG tablet Take 20 mg by mouth 2 times daily   Yes Historical Provider, MD   Multiple Vitamin (MULTI-VITAMIN DAILY PO) Take by mouth   Yes Historical Provider, MD   Ascorbic Acid (VITAMIN C) 250 MG tablet Take 250 mg by mouth daily   Yes Historical Provider, MD   Cholecalciferol (VITAMIN D3) 2000 units CAPS Take by mouth   Yes Historical Provider, MD   acetaminophen (TYLENOL) 325 MG tablet Take 325 mg by mouth every 6 hours as needed for Pain   Yes Historical Provider, MD   diphenhydrAMINE (BENADRYL) 25 MG tablet Take 25 mg by mouth every 6 hours as needed for Itching   Yes Historical Provider, MD        Allergies: Other    Social History:     Tobacco:    reports that she has been smoking. She has a 15.00 pack-year smoking history. She has never used smokeless tobacco.  Alcohol:      reports no history of alcohol use. Drug Use:  reports no history of drug use. Family History:     No family history on file. Review of Systems:     Positive and Negative as described in HPI    Review of Systems   Constitutional: Negative. Skin: Negative. Neurological: Negative.         Physical Exam:     Vitals:  BP (!) 170/74   Pulse 90   Ht 5' 6\" (1.676 m)   Wt 107 lb (48.5 kg) LMP  (LMP Unknown)   SpO2 99%   BMI 17.27 kg/m²   Physical Exam  Cardiovascular:      Rate and Rhythm: Normal rate and regular rhythm. Heart sounds: No murmur. Pulmonary:      Effort: Pulmonary effort is normal.      Breath sounds: Normal breath sounds. Musculoskeletal:      Comments: Left shoulder normal range of motion, 4/5 strength with empty can, pain elicited with empty can also with Noel. Normal strength in the hands, no muscle atrophy, normal sensation. Tenderness over distal attachments of rotator cuff muscles on the left shoulder. Psychiatric:         Mood and Affect: Mood normal.         Behavior: Behavior normal.         Assessment & Plan:        Diagnosis Orders   1. Rotator cuff tendonitis, left     2. Essential hypertension     1. History and exam consistent with left rotator cuff tendinitis. No apparent tear. No crepitus or limited range of motion to indicate advanced arthritis. Pain radiating into typical rotator cuff impingement pattern. Offered patient injection versus oral steroid treatment and she opted for oral treatment. Start exercises as below. Follow-up if not improving and follow-up in 1 month regardless. 2. Blood pressure is elevated which has been rather chronic according to records. Especially with starting the prednisone which could elevate her blood pressure, it is important for us to get this back into control. Hydrochlorothiazide prescribed. Follow-up 1 month. Requested Prescriptions     Signed Prescriptions Disp Refills    predniSONE (DELTASONE) 20 MG tablet 10 tablet 0     Sig: Take 2 tablets by mouth daily for 5 days    hydroCHLOROthiazide (HYDRODIURIL) 25 MG tablet 30 tablet 5     Sig: Take 1 tablet by mouth every morning       Patient Instructions     SURVEY:    You may be receiving a survey from [employer] regarding your visit today. You may get this in the mail, through your MyChart or in your email.      Please complete the survey to enable us to provide the highest quality of care to you and your family. If you cannot score us as very good ( 5 Stars) on any question, please feel free to call the office to discuss how we could have made your experience exceptional.     Thank you. Clinical Care Team:  [facility], [address] [place] [address] Team:  [facility]    Patient Education        Shoulder Blade: Exercises  Introduction  Here are some examples of exercises for you to try. The exercises may be suggested for a condition or for rehabilitation. Start each exercise slowly. Ease off the exercises if you start to have pain. You will be told when to start these exercises and which ones will work best for you. How to do the exercises  Shoulder roll   1. Stand tall with your chin slightly tucked. Imagine that a string at the top of your head is pulling you straight up. 2. Keep your arms relaxed. All motion will be in your shoulders. 3. Shrug your shoulders up toward your ears, then up and back. [place] your shoulders down and back, like you're sliding your hands down into your back pants pockets. 4. Repeat the circles at least 2 to 4 times. 5. This exercise is also helpful anytime you want to relax. Lower neck and upper back stretch   1. With your arms about shoulder height, clasp your hands in front of you. 2. Drop your chin toward your chest.  3. Reach straight forward so you are rounding your upper back. Think about pulling your shoulder blades apart. Vinay Rodriguez feel a stretch across your upper back and shoulders. Hold for at least 6 seconds. 4. Repeat 2 to 4 times. Triceps stretch   1. Reach your arm straight up. 2. Keeping your elbow in place, bend your arm and reach your hand down behind your back. 3. With your other hand, apply gentle pressure to the bent elbow.  Vinay Rodriguez feel a stretch at the back of your upper arm and shoulder. Hold about 6 seconds. 4. Repeat 2 to 4 times with each arm. Shoulder stretch   1. Relax your shoulders. 2. Raise one arm to shoulder height, and reach it across your chest.  3. Pull the arm slightly toward you with your other arm. This will help you get a gentle stretch. Hold for about 6 seconds. 4. Repeat 2 to 4 times. Shoulder blade squeeze   1. Sit or stand up tall with your arms at your sides. 2. Keep your shoulders relaxed and down, not shrugged. 3. Squeeze your shoulder blades together. Hold for 6 seconds, then relax. 4. Repeat 8 to 12 times. Straight-arm shoulder blade squeeze   1. Sit or stand tall. Relax your shoulders. 2. With palms down, hold your elastic tubing or band straight out in front of you. 3. Start with slight tension in the tubing or band, with your hands about shoulder-width apart. 4. Slowly pull straight out to the sides, squeezing your shoulder blades together. Keep your arms straight and at shoulder height. Slowly release. 5. Repeat 8 to 12 times. Rowing   1. [place] your elastic tubing or band at about waist height. Take one end in each hand. 2. Sit or stand with your feet hip-width apart. 3. Hold your arms straight in front of you. Adjust your distance to create slight tension in the tubing or band. 4. Slightly tuck your chin. Relax your shoulders. 5. Without shrugging your shoulders, pull straight back. Your elbows will pass alongside your waist.    Pull-downs   1. [place] your elastic tubing or band in the top of a closed door. Take one end in each hand. 2. Either sit or stand, depending on what is more comfortable. If you feel unsteady, sit on a chair. 3. Start with your arms up and comfortably apart, elbows straight. There should be a slight tension in the tubing or band. 4. Slightly tuck your chin, and look straight ahead. 5. Keeping your back straight, slowly pull down and back, bending your elbows.   6. Stop where your hands are level with your chin, in a \"goalpost\" position. 7. Repeat 8 to 12 times. Chest T stretch   1. Lie on your back. Raise your knees so they are bent. Plant your feet on the floor, hip-width apart. 2. Tuck your chin, and relax your shoulders. 3. Reach your arms straight out to the sides. If you don't feel a mild stretch in your shoulders and across your chest, use a foam roll or a tightly rolled blanket under your spine, from your tailbone to your head. 4. Relax in this position for at least 15 to 30 seconds while you breathe normally. Repeat 2 to 4 times. 5. As you get used to this stretch, keep adding a little more time until you are able relax in this position for 2 or 3 minutes. When you can relax for at least 2 minutes, you only need to do the exercise 1 time per session. Chest goalpost stretch   1. Lie on your back. Raise your knees so they are bent. Plant your feet on the floor, hip-width apart. 2. Tuck your chin, and relax your shoulders. 3. Reach your arms straight out to the sides. 4. Bend your arms at the elbows, with your hands pointed toward the top of your head. Your arms should make an L on either side of your head. Your palms should be facing up. 5. If you don't feel a mild stretch in your shoulders and across your chest, use a foam roll or tightly rolled blanket under your spine, from your tailbone to your head. 6. Relax in this position for at least 15 to 30 seconds while you breathe normally. Repeat 2 to 4 times. 7. Each day you do this exercise, add a little more time until you can relax in this position for 2 or 3 minutes. When you can relax for at least 2 minutes, you only need to do the exercise 1 time per session. Follow-up care is a key part of your treatment and safety. Be sure to make and go to all appointments, and call your doctor if you are having problems. It's also a good idea to know your test results and keep a list of the medicines you take.   Where can you learn more? Go to https://chpepiceweb.healthLailaihui. org and sign in to your [employer] account. Enter [phone] in the [employer] box to learn more about \"Shoulder Blade: Exercises. \"     If you do not have an account, please click on the \"Sign Up Now\" link. Current as of: March 2, 2020               Content Version: 12.6  © 8419-8884 [employer]. Care instructions adapted under license by [facility] ([facility]). If you have questions about a medical condition or this instruction, always ask your healthcare professional. [place] [address] any warranty or liability for your use of this information. Mckayla Oliveira received counseling on the following healthy behaviors: medication adherence  Reviewed prior labs and health maintenance. Continue current medications, diet and exercise. Discussed use, benefit, and side effects of prescribed medications. Barriers to medication compliance addressed. Patient given educational materials - see patient instructions. All patient questions answered. Patient voiced understanding.      Electronically signed by Latia Washington DO on 1/31/2021 at 1:12 PM   [address]  Dept: [phone]

## 2021-01-27 NOTE — PATIENT INSTRUCTIONS
SURVEY:    You may be receiving a survey from [employer] regarding your visit today. You may get this in the mail, through your MyChart or in your email. Please complete the survey to enable us to provide the highest quality of care to you and your family. If you cannot score us as very good ( 5 Stars) on any question, please feel free to call the office to discuss how we could have made your experience exceptional.     Thank you. Clinical Care Team:  Dr. Shaggy Biggs, DO Omar Figueredo, [address] [place] [address] Team:  [facility]    Patient Education        Shoulder Blade: Exercises  Introduction  Here are some examples of exercises for you to try. The exercises may be suggested for a condition or for rehabilitation. Start each exercise slowly. Ease off the exercises if you start to have pain. You will be told when to start these exercises and which ones will work best for you. How to do the exercises  Shoulder roll   1. Stand tall with your chin slightly tucked. Imagine that a string at the top of your head is pulling you straight up. 2. Keep your arms relaxed. All motion will be in your shoulders. 3. Shrug your shoulders up toward your ears, then up and back. [place] your shoulders down and back, like you're sliding your hands down into your back pants pockets. 4. Repeat the circles at least 2 to 4 times. 5. This exercise is also helpful anytime you want to relax. Lower neck and upper back stretch   1. With your arms about shoulder height, clasp your hands in front of you. 2. Drop your chin toward your chest.  3. Reach straight forward so you are rounding your upper back. Think about pulling your shoulder blades apart. [place] feel a stretch across your upper back and shoulders. Hold for at least 6 seconds. 4. Repeat 2 to 4 times. Triceps stretch   1.  Reach your arm straight up.  2. Keeping your elbow in place, bend your arm and reach your hand down behind your back. 3. With your other hand, apply gentle pressure to the bent elbow. [place] Postin feel a stretch at the back of your upper arm and shoulder. Hold about 6 seconds. 4. Repeat 2 to 4 times with each arm. Shoulder stretch   1. Relax your shoulders. 2. Raise one arm to shoulder height, and reach it across your chest.  3. Pull the arm slightly toward you with your other arm. This will help you get a gentle stretch. Hold for about 6 seconds. 4. Repeat 2 to 4 times. Shoulder blade squeeze   1. Sit or stand up tall with your arms at your sides. 2. Keep your shoulders relaxed and down, not shrugged. 3. Squeeze your shoulder blades together. Hold for 6 seconds, then relax. 4. Repeat 8 to 12 times. Straight-arm shoulder blade squeeze   1. Sit or stand tall. Relax your shoulders. 2. With palms down, hold your elastic tubing or band straight out in front of you. 3. Start with slight tension in the tubing or band, with your hands about shoulder-width apart. 4. Slowly pull straight out to the sides, squeezing your shoulder blades together. Keep your arms straight and at shoulder height. Slowly release. 5. Repeat 8 to 12 times. Rowing   1. [place] your elastic tubing or band at about waist height. Take one end in each hand. 2. Sit or stand with your feet hip-width apart. 3. Hold your arms straight in front of you. Adjust your distance to create slight tension in the tubing or band. 4. Slightly tuck your chin. Relax your shoulders. 5. Without shrugging your shoulders, pull straight back. Your elbows will pass alongside your waist.    Pull-downs   1. [place] your elastic tubing or band in the top of a closed door. Take one end in each hand. 2. Either sit or stand, depending on what is more comfortable. If you feel unsteady, sit on a chair. 3. Start with your arms up and comfortably apart, elbows straight.  There should be a slight tension in the tubing or band. 4. Slightly tuck your chin, and look straight ahead. 5. Keeping your back straight, slowly pull down and back, bending your elbows. 6. Stop where your hands are level with your chin, in a \"goalpost\" position. 7. Repeat 8 to 12 times. Chest T stretch   1. Lie on your back. Raise your knees so they are bent. Plant your feet on the floor, hip-width apart. 2. Tuck your chin, and relax your shoulders. 3. Reach your arms straight out to the sides. If you don't feel a mild stretch in your shoulders and across your chest, use a foam roll or a tightly rolled blanket under your spine, from your tailbone to your head. 4. Relax in this position for at least 15 to 30 seconds while you breathe normally. Repeat 2 to 4 times. 5. As you get used to this stretch, keep adding a little more time until you are able relax in this position for 2 or 3 minutes. When you can relax for at least 2 minutes, you only need to do the exercise 1 time per session. Chest goalpost stretch   1. Lie on your back. Raise your knees so they are bent. Plant your feet on the floor, hip-width apart. 2. Tuck your chin, and relax your shoulders. 3. Reach your arms straight out to the sides. 4. Bend your arms at the elbows, with your hands pointed toward the top of your head. Your arms should make an L on either side of your head. Your palms should be facing up. 5. If you don't feel a mild stretch in your shoulders and across your chest, use a foam roll or tightly rolled blanket under your spine, from your tailbone to your head. 6. Relax in this position for at least 15 to 30 seconds while you breathe normally. Repeat 2 to 4 times. 7. Each day you do this exercise, add a little more time until you can relax in this position for 2 or 3 minutes. When you can relax for at least 2 minutes, you only need to do the exercise 1 time per session. Follow-up care is a key part of your treatment and safety.  Be sure to make and go to all appointments, and call your doctor if you are having problems. It's also a good idea to know your test results and keep a list of the medicines you take. Where can you learn more? Go to https://chluis a.[employer]. org and sign in to your [employer] account. Enter [phone] in the [employer] box to learn more about \"Shoulder Blade: Exercises. \"     If you do not have an account, please click on the \"Sign Up Now\" link. Current as of: March 2, 2020               Content Version: 12.6  © 9273-9985 [employer]. Care instructions adapted under license by [facility] ([facility]). If you have questions about a medical condition or this instruction, always ask your healthcare professional. [address] any warranty or liability for your use of this information.

## 2021-01-31 PROBLEM — I10 ESSENTIAL HYPERTENSION: Status: ACTIVE | Noted: 2021-01-31

## 2021-02-02 ENCOUNTER — TELEPHONE (OUTPATIENT)
Dept: FAMILY MEDICINE CLINIC | Age: 61
End: 2021-02-02

## 2021-02-02 NOTE — TELEPHONE ENCOUNTER
Was wrote off until the 1st. She was not  able to go to work last night. She is asking for a note for  today (2nd). She has been doing the exercises and plans on going back to work tonight.  She will pick the note up

## 2021-02-02 NOTE — LETTER
[facility] [address]ILA Bruner[address] [address]  Phone: [phone]  Fax: Matt 106, DO        February 2, 2021     Patient: Malvin Davis   YOB: 1960   Date of Visit: 2/2/2021       To Whom It May Concern: It is my medical opinion that Malvin Davis should remain out of work until 2/3/21. excuse her on 2/2/21. If you have any questions or concerns, please don't hesitate to call.     Sincerely,        Roma Goldberg, DO

## 2021-02-26 ENCOUNTER — OFFICE VISIT (OUTPATIENT)
Dept: FAMILY MEDICINE CLINIC | Age: 61
End: 2021-02-26
Payer: COMMERCIAL

## 2021-02-26 VITALS
BODY MASS INDEX: 17.04 KG/M2 | OXYGEN SATURATION: 98 % | HEART RATE: 83 BPM | SYSTOLIC BLOOD PRESSURE: 138 MMHG | DIASTOLIC BLOOD PRESSURE: 68 MMHG | HEIGHT: 66 IN | WEIGHT: 106 LBS

## 2021-02-26 DIAGNOSIS — M75.82 ROTATOR CUFF TENDONITIS, LEFT: Primary | ICD-10-CM

## 2021-02-26 DIAGNOSIS — R09.89 CHEST CONGESTION: ICD-10-CM

## 2021-02-26 PROCEDURE — 99213 OFFICE O/P EST LOW 20 MIN: CPT | Performed by: FAMILY MEDICINE

## 2021-02-26 PROCEDURE — 20610 DRAIN/INJ JOINT/BURSA W/O US: CPT | Performed by: FAMILY MEDICINE

## 2021-02-26 RX ORDER — TRIAMCINOLONE ACETONIDE 40 MG/ML
40 INJECTION, SUSPENSION INTRA-ARTICULAR; INTRAMUSCULAR ONCE
Status: COMPLETED | OUTPATIENT
Start: 2021-02-26 | End: 2021-02-26

## 2021-02-26 RX ADMIN — TRIAMCINOLONE ACETONIDE 40 MG: 40 INJECTION, SUSPENSION INTRA-ARTICULAR; INTRAMUSCULAR at 14:46

## 2021-02-26 NOTE — PROGRESS NOTES
MCHC, RDW, PLT, MPV  No results found for: TSH  Lab Results   Component Value Date    CHOL 235 01/03/2020    HDL 68 01/03/2020    LABA1C 5.3 01/03/2020         Assessment & Plan:        Diagnosis Orders   1. Rotator cuff tendonitis, left  triamcinolone acetonide (KENALOG-40) injection 40 mg    20610 - OK DRAIN/INJECT LARGE JOINT/BURSA   2. Chest congestion     ongoing L shoulder pain, does have some features c/w cervical radic but definitely has mechanical features also. Failed home exercises. Steroid injection performed. Also discussed PT, MRI. Pt opted for injection and will continue home exercise, f/u if not improving. Also asked for FMLA intermittent leave as she has missed some work and believes she may need to continue to miss for pain a few days per month. Chest congestion likely viral URI, advised treating with otc meds and try albuterol also. Call early next wk if not improving and could rx atb. Requested Prescriptions      No prescriptions requested or ordered in this encounter         Patient Instructions     SURVEY:    You may be receiving a survey from [place] regarding your visit today. You may get this in the mail, through your MyChart or in your email. Please complete the survey to enable us to provide the highest quality of care to you and your family. If you cannot score us as very good ( 5 Stars) on any question, please feel free to call the office to discuss how we could have made your experience exceptional.     Thank you. Clinical Care Team:  DO Oswald Brooks, [address] [place] [address] Team:  100 [facility]    Patient Education        Low Back Pain: Exercises  Introduction  Here are some examples of exercises for you to try. The exercises may be suggested for a condition or for rehabilitation. Start each exercise slowly.  Ease off the exercises if you start to have pain. You will be told when to start these exercises and which ones will work best for you. How to do the exercises  Press-up   1. Lie on your stomach, supporting your body with your forearms. 2. Press your elbows down into the floor to raise your upper back. As you do this, relax your stomach muscles and allow your back to arch without using your back muscles. As your press up, do not let your hips or pelvis come off the floor. 3. Hold for 15 to 30 seconds, then relax. 4. Repeat 2 to 4 times. Alternate arm and leg (bird dog) exercise   Do this exercise slowly. Try to keep your body straight at all times, and do not let one hip drop lower than the other. 1. Start on the floor, on your hands and knees. 2. Tighten your belly muscles. 3. Raise one leg off the floor, and hold it straight out behind you. Be careful not to let your hip drop down, because that will twist your trunk. 4. Hold for about 6 seconds, then lower your leg and switch to the other leg. 5. Repeat 8 to 12 times on each leg. 6. Over time, work up to holding for 10 to 30 seconds each time. 7. If you feel stable and secure with your leg raised, try raising the opposite arm straight out in front of you at the same time. Knee-to-chest exercise   1. Lie on your back with your knees bent and your feet flat on the floor. 2. Bring one knee to your chest, keeping the other foot flat on the floor (or keeping the other leg straight, whichever feels better on your lower back). 3. Keep your lower back pressed to the floor. Hold for at least 15 to 30 seconds. 4. Relax, and lower the knee to the starting position. 5. Repeat with the other leg. Repeat 2 to 4 times with each leg. 6. To get more stretch, put your other leg flat on the floor while pulling your knee to your chest.    Curl-ups   1. Lie on the floor on your back with your knees bent at a 90-degree angle.  Your feet should be flat on the floor, about 12 inches from your buttocks. 2. Cross your arms over your chest. If this bothers your neck, try putting your hands behind your neck (not your head), with your elbows spread apart. 3. Slowly tighten your belly muscles and raise your shoulder blades off the floor. 4. Keep your head in line with your body, and do not press your chin to your chest.  5. Hold this position for 1 or 2 seconds, then slowly lower yourself back down to the floor. 6. Repeat 8 to 12 times. Pelvic tilt exercise   1. Lie on your back with your knees bent. 2. \"Brace\" your stomach. This means to tighten your muscles by pulling in and imagining your belly button moving toward your spine. You should feel like your back is pressing to the floor and your hips and pelvis are rocking back. 3. Hold for about 6 seconds while you breathe smoothly. 4. Repeat 8 to 12 times. Heel dig bridging   1. Lie on your back with both knees bent and your ankles bent so that only your heels are digging into the floor. Your knees should be bent about 90 degrees. 2. Then push your heels into the floor, squeeze your buttocks, and lift your hips off the floor until your shoulders, hips, and knees are all in a straight line. 3. Hold for about 6 seconds as you continue to breathe normally, and then slowly lower your hips back down to the floor and rest for up to 10 seconds. 4. Do 8 to 12 repetitions. Hamstring stretch in doorway   1. Lie on your back in a doorway, with one leg through the open door. 2. Slide your leg up the wall to straighten your knee. You should feel a gentle stretch down the back of your leg. 3. Hold the stretch for at least 15 to 30 seconds. Do not arch your back, point your toes, or bend either knee. Keep one heel touching the floor and the other heel touching the wall. 4. Repeat with your other leg. 5. Do 2 to 4 times for each leg. Hip flexor stretch   1. Kneel on the floor with one knee bent and one leg behind you.  Place your forward knee over your foot. Keep your other knee touching the floor. 2. Slowly push your hips forward until you feel a stretch in the upper thigh of your rear leg. 3. Hold the stretch for at least 15 to 30 seconds. Repeat with your other leg. 4. Do 2 to 4 times on each side. Wall sit   1. Stand with your back 10 to 12 inches away from a wall. 2. Lean into the wall until your back is flat against it. 3. Slowly slide down until your knees are slightly bent, pressing your lower back into the wall. 4. Hold for about 6 seconds, then slide back up the wall. 5. Repeat 8 to 12 times. Follow-up care is a key part of your treatment and safety. Be sure to make and go to all appointments, and call your doctor if you are having problems. It's also a good idea to know your test results and keep a list of the medicines you take. Where can you learn more? Go to https://Wesabe.Desk. org and sign in to your CoPromote account. Enter A462 in the Skout box to learn more about \"Low Back Pain: Exercises. \"     If you do not have an account, please click on the \"Sign Up Now\" link. Current as of: March 2, 2020               Content Version: 12.6  © 0097-1496 [employer], Incorporated. Care instructions adapted under license by [facility] ([facility]). If you have questions about a medical condition or this instruction, always ask your healthcare professional. [place] [address] any warranty or liability for your use of this information. Patient Education        Healthy Upper Back: Exercises  Introduction  Here are some examples of exercises for your upper back. Start each exercise slowly. Ease off the exercise if you start to have pain. Your doctor or physical therapist will tell you when you can start these exercises and which ones will work best for you. How to do the exercises  Lower neck and upper back stretch   1. Stretch your arms out in front of your body.  Clasp one hand on top of your other hand. 2. Gently reach out so that you feel your shoulder blades stretching away from each other. 3. Gently bend your head forward. 4. Hold for 15 to 30 seconds. 5. Repeat 2 to 4 times. Midback stretch   If you have knee pain, do not do this exercise. 1. Kneel on the floor, and sit back on your ankles. 2. Lean forward, place your hands on the floor, and stretch your arms out in front of you. Rest your head between your arms. 3. Gently push your chest toward the floor, reaching as far in front of you as possible. 4. Hold for 15 to 30 seconds. 5. Repeat 2 to 4 times. Shoulder rolls   1. Sit comfortably with your feet shoulder-width apart. You can also do this exercise while standing. 2. Roll your shoulders up, then back, and then down in a smooth, circular motion. 3. Repeat 2 to 4 times. Wall push-up   1. Stand against a wall with your feet about 12 to 24 inches back from the wall. If you feel any pain when you do this exercise, stand closer to the wall. 2. Place your hands on the wall slightly wider apart than your shoulders, and lean forward. 3. Gently lean your body toward the wall. Then push back to your starting position. Keep the motion smooth and controlled. 4. Repeat 8 to 12 times. Resisted shoulder blade squeeze   For this exercise, you will need elastic exercise material, such as surgical tubing or Thera-Band. 1. Sit or stand, holding the band in both hands in front of you. Keep your elbows close to your sides, bent at a 90-degree angle. Your palms should face up. 2. Squeeze your shoulder blades together, and move your arms to the outside, stretching the band. Be sure to keep your elbows at your sides while you do this. 3. Relax. 4. Repeat 8 to 12 times. Resisted rows   For this exercise, you will need elastic exercise material, such as surgical tubing or Thera-Band. 1. Put the band around a solid object, such as a bedpost, at about waist level.  Hold one end of the band in each hand. 2. With your elbows at your sides and bent to 90 degrees, pull the band back to move your shoulder blades toward each other. Return to the starting position. 3. Repeat 8 to 12 times. Follow-up care is a key part of your treatment and safety. Be sure to make and go to all appointments, and call your doctor if you are having problems. It's also a good idea to know your test results and keep a list of the medicines you take. Where can you learn more? Go to https://AceablepeSight Sciences.Wealthsimple. org and sign in to your farmbuy account. Enter Y271 in the LiveGO box to learn more about \"Healthy Upper Back: Exercises. \"     If you do not have an account, please click on the \"Sign Up Now\" link. Current as of: March 2, 2020               Content Version: 12.6  © 9555-3264 Hickies, Incorporated. Care instructions adapted under license by [facility] ([facility]). If you have questions about a medical condition or this instruction, always ask your healthcare professional. [place] [address] any warranty or liability for your use of this information. Yoli Jameson received counseling on the following healthy behaviors: medication adherence  Reviewed prior labs and health maintenance. Continue current medications, diet and exercise. Discussed use, benefit, and side effects of prescribed medications. Barriers to medication compliance addressed. Patient given educational materials - see patient instructions. All patient questions answered. Patient voiced understanding.        Electronically signed by Colin Simpson DO on 2/28/2021 at 10:35 PM  [address]  Dept: [phone]

## 2021-02-26 NOTE — PATIENT INSTRUCTIONS
SURVEY:    You may be receiving a survey from hint regarding your visit today. You may get this in the mail, through your MyChart or in your email. Please complete the survey to enable us to provide the highest quality of care to you and your family. If you cannot score us as very good ( 5 Stars) on any question, please feel free to call the office to discuss how we could have made your experience exceptional.     Thank you. Clinical Care Team:  Dr. Elsie Best, DO Pipo Stovall, [address] [place] [address] Team:  100 [facility]    Patient Education        Low Back Pain: Exercises  Introduction  Here are some examples of exercises for you to try. The exercises may be suggested for a condition or for rehabilitation. Start each exercise slowly. Ease off the exercises if you start to have pain. You will be told when to start these exercises and which ones will work best for you. How to do the exercises  Press-up   1. Lie on your stomach, supporting your body with your forearms. 2. Press your elbows down into the floor to raise your upper back. As you do this, relax your stomach muscles and allow your back to arch without using your back muscles. As your press up, do not let your hips or pelvis come off the floor. 3. Hold for 15 to 30 seconds, then relax. 4. Repeat 2 to 4 times. Alternate arm and leg (bird dog) exercise   Do this exercise slowly. Try to keep your body straight at all times, and do not let one hip drop lower than the other. 1. Start on the floor, on your hands and knees. 2. Tighten your belly muscles. 3. Raise one leg off the floor, and hold it straight out behind you. Be careful not to let your hip drop down, because that will twist your trunk. 4. Hold for about 6 seconds, then lower your leg and switch to the other leg.   5. Repeat 8 to 12 times on each leg.  6. Over time, work up to holding for 10 to 30 seconds each time. 7. If you feel stable and secure with your leg raised, try raising the opposite arm straight out in front of you at the same time. Knee-to-chest exercise   1. Lie on your back with your knees bent and your feet flat on the floor. 2. Bring one knee to your chest, keeping the other foot flat on the floor (or keeping the other leg straight, whichever feels better on your lower back). 3. Keep your lower back pressed to the floor. Hold for at least 15 to 30 seconds. 4. Relax, and lower the knee to the starting position. 5. Repeat with the other leg. Repeat 2 to 4 times with each leg. 6. To get more stretch, put your other leg flat on the floor while pulling your knee to your chest.    Curl-ups   1. Lie on the floor on your back with your knees bent at a 90-degree angle. Your feet should be flat on the floor, about 12 inches from your buttocks. 2. Cross your arms over your chest. If this bothers your neck, try putting your hands behind your neck (not your head), with your elbows spread apart. 3. Slowly tighten your belly muscles and raise your shoulder blades off the floor. 4. Keep your head in line with your body, and do not press your chin to your chest.  5. Hold this position for 1 or 2 seconds, then slowly lower yourself back down to the floor. 6. Repeat 8 to 12 times. Pelvic tilt exercise   1. Lie on your back with your knees bent. 2. \"Brace\" your stomach. This means to tighten your muscles by pulling in and imagining your belly button moving toward your spine. You should feel like your back is pressing to the floor and your hips and pelvis are rocking back. 3. Hold for about 6 seconds while you breathe smoothly. 4. Repeat 8 to 12 times. Heel dig bridging   1. Lie on your back with both knees bent and your ankles bent so that only your heels are digging into the floor. Your knees should be bent about 90 degrees.   2. Then push your heels into the floor, squeeze your buttocks, and lift your hips off the floor until your shoulders, hips, and knees are all in a straight line. 3. Hold for about 6 seconds as you continue to breathe normally, and then slowly lower your hips back down to the floor and rest for up to 10 seconds. 4. Do 8 to 12 repetitions. Hamstring stretch in doorway   1. Lie on your back in a doorway, with one leg through the open door. 2. Slide your leg up the wall to straighten your knee. You should feel a gentle stretch down the back of your leg. 3. Hold the stretch for at least 15 to 30 seconds. Do not arch your back, point your toes, or bend either knee. Keep one heel touching the floor and the other heel touching the wall. 4. Repeat with your other leg. 5. Do 2 to 4 times for each leg. Hip flexor stretch   1. Kneel on the floor with one knee bent and one leg behind you. Place your forward knee over your foot. Keep your other knee touching the floor. 2. Slowly push your hips forward until you feel a stretch in the upper thigh of your rear leg. 3. Hold the stretch for at least 15 to 30 seconds. Repeat with your other leg. 4. Do 2 to 4 times on each side. Wall sit   1. Stand with your back 10 to 12 inches away from a wall. 2. Lean into the wall until your back is flat against it. 3. Slowly slide down until your knees are slightly bent, pressing your lower back into the wall. 4. Hold for about 6 seconds, then slide back up the wall. 5. Repeat 8 to 12 times. Follow-up care is a key part of your treatment and safety. Be sure to make and go to all appointments, and call your doctor if you are having problems. It's also a good idea to know your test results and keep a list of the medicines you take. Where can you learn more? Go to https://german.Salesfusion. org and sign in to your Degree Controls account. Enter G885 in the [facility] box to learn more about \"Low Back Pain: Exercises. \" If you do not have an account, please click on the \"Sign Up Now\" link. Current as of: March 2, 2020               Content Version: 12.6  © 2006-2020 Visage Mobile, Incorporated. Care instructions adapted under license by [facility] ([facility]). If you have questions about a medical condition or this instruction, always ask your healthcare professional. [address] any warranty or liability for your use of this information. Patient Education        Healthy Upper Back: Exercises  Introduction  Here are some examples of exercises for your upper back. Start each exercise slowly. Ease off the exercise if you start to have pain. Your doctor or physical therapist will tell you when you can start these exercises and which ones will work best for you. How to do the exercises  Lower neck and upper back stretch   1. Stretch your arms out in front of your body. Clasp one hand on top of your other hand. 2. Gently reach out so that you feel your shoulder blades stretching away from each other. 3. Gently bend your head forward. 4. Hold for 15 to 30 seconds. 5. Repeat 2 to 4 times. Midback stretch   If you have knee pain, do not do this exercise. 1. Kneel on the floor, and sit back on your ankles. 2. Lean forward, place your hands on the floor, and stretch your arms out in front of you. Rest your head between your arms. 3. Gently push your chest toward the floor, reaching as far in front of you as possible. 4. Hold for 15 to 30 seconds. 5. Repeat 2 to 4 times. Shoulder rolls   1. Sit comfortably with your feet shoulder-width apart. You can also do this exercise while standing. 2. Roll your shoulders up, then back, and then down in a smooth, circular motion. 3. Repeat 2 to 4 times. Wall push-up   1. Stand against a wall with your feet about 12 to 24 inches back from the wall. If you feel any pain when you do this exercise, stand closer to the wall.   2. Place your hands on the wall slightly wider apart than your shoulders, and lean forward. 3. Gently lean your body toward the wall. Then push back to your starting position. Keep the motion smooth and controlled. 4. Repeat 8 to 12 times. Resisted shoulder blade squeeze   For this exercise, you will need elastic exercise material, such as surgical tubing or Thera-Band. 1. Sit or stand, holding the band in both hands in front of you. Keep your elbows close to your sides, bent at a 90-degree angle. Your palms should face up. 2. Squeeze your shoulder blades together, and move your arms to the outside, stretching the band. Be sure to keep your elbows at your sides while you do this. 3. Relax. 4. Repeat 8 to 12 times. Resisted rows   For this exercise, you will need elastic exercise material, such as surgical tubing or Thera-Band. 1. Put the band around a solid object, such as a bedpost, at about waist level. Hold one end of the band in each hand. 2. With your elbows at your sides and bent to 90 degrees, pull the band back to move your shoulder blades toward each other. Return to the starting position. 3. Repeat 8 to 12 times. Follow-up care is a key part of your treatment and safety. Be sure to make and go to all appointments, and call your doctor if you are having problems. It's also a good idea to know your test results and keep a list of the medicines you take. Where can you learn more? Go to https://MEARS TechnologiespepicewHouseTrip.AxesNetwork. org and sign in to your [email or web address] account. Enter T769 in the uBid Holdings box to learn more about \"Healthy Upper Back: Exercises. \"     If you do not have an account, please click on the \"Sign Up Now\" link. Current as of: March 2, 2020               Content Version: 12.6  © 7328-1653 [employer], Incorporated. Care instructions adapted under license by [facility] ([facility]).  If you have questions about a medical condition or this instruction, always ask your healthcare professional. Woodward Dakin, Incorporated disclaims any warranty or liability for your use of this information.

## 2021-03-02 ENCOUNTER — TELEPHONE (OUTPATIENT)
Dept: FAMILY MEDICINE CLINIC | Age: 61
End: 2021-03-02

## 2021-03-02 DIAGNOSIS — J06.9 ACUTE URI: Primary | ICD-10-CM

## 2021-03-02 RX ORDER — AZITHROMYCIN 250 MG/1
TABLET, FILM COATED ORAL
Qty: 6 TABLET | Refills: 0 | Status: SHIPPED | OUTPATIENT
Start: 2021-03-02 | End: 2021-03-26 | Stop reason: ALTCHOICE

## 2021-03-02 NOTE — TELEPHONE ENCOUNTER
Patient was seen 2/26/21 and states was told if her chest cold wasn't any better to call the first of the week and something could be phoned in for her. Patient states she is not feeling much better. Please advise. Health Maintenance   Topic Date Due    Hepatitis C screen  Never done    HIV screen  Never done    DTaP/Tdap/Td vaccine (1 - Tdap) Never done    Cervical cancer screen  Never done    Colon cancer screen colonoscopy  Never done    Shingles Vaccine (3 of 3) 12/25/2020    Potassium monitoring  01/03/2021    Creatinine monitoring  01/03/2021    Breast cancer screen  01/24/2022    Lipid screen  01/03/2025    Flu vaccine  Completed    Pneumococcal 0-64 years Vaccine  Completed    Hepatitis A vaccine  Aged Out    Hepatitis B vaccine  Aged Out    Hib vaccine  Aged Out    Meningococcal (ACWY) vaccine  Aged Out             (applicable per patient's age: Cancer Screenings, Depression Screening, Fall Risk Screening, Immunizations)    Hemoglobin A1C (%)   Date Value   01/03/2020 5.3     LDL Cholesterol (mg/dL)   Date Value   01/03/2020 134 (H)     AST (U/L)   Date Value   01/03/2020 23     ALT (U/L)   Date Value   01/03/2020 25     BUN (mg/dL)   Date Value   01/03/2020 17      (goal A1C is < 7)   (goal LDL is <100) need 30-50% reduction from baseline     BP Readings from Last 3 Encounters:   02/26/21 138/68   01/27/21 (!) 170/74   10/07/20 138/88    (goal /80)      All Future Testing planned in [facility]:      Next Visit Date:  No future appointments.          Patient Active Problem List:     Chronic eczematous otitis externa of both ears     Essential hypertension

## 2021-03-26 ENCOUNTER — OFFICE VISIT (OUTPATIENT)
Dept: FAMILY MEDICINE CLINIC | Age: 61
End: 2021-03-26
Payer: COMMERCIAL

## 2021-03-26 VITALS
HEART RATE: 102 BPM | HEIGHT: 66 IN | OXYGEN SATURATION: 98 % | DIASTOLIC BLOOD PRESSURE: 78 MMHG | WEIGHT: 105 LBS | BODY MASS INDEX: 16.88 KG/M2 | SYSTOLIC BLOOD PRESSURE: 168 MMHG

## 2021-03-26 DIAGNOSIS — G89.29 CHRONIC LEFT SHOULDER PAIN: Primary | ICD-10-CM

## 2021-03-26 DIAGNOSIS — M25.512 CHRONIC LEFT SHOULDER PAIN: Primary | ICD-10-CM

## 2021-03-26 PROCEDURE — 99213 OFFICE O/P EST LOW 20 MIN: CPT | Performed by: FAMILY MEDICINE

## 2021-03-26 RX ORDER — IBUPROFEN 600 MG/1
600 TABLET ORAL 3 TIMES DAILY PRN
Qty: 60 TABLET | Refills: 2 | Status: ON HOLD | OUTPATIENT
Start: 2021-03-26 | End: 2021-04-21 | Stop reason: HOSPADM

## 2021-03-26 NOTE — LETTER
[facility] [place] [facility] PRIMARY CARE LILA Guevara [address] [place] [id number]  Phone: [phone]  Fax: [address], DO        March 26, 2021     Patient: Roby Sibley   YOB: 1960   Date of Visit: 3/26/2021       To Whom It May Concern: It is my medical opinion that Roby Sibley missed work March 24-26 due to a medical condition. She may return 3/29. If you have any questions or concerns, please don't hesitate to call.     Sincerely,        Randolph Gong, DO

## 2021-03-26 NOTE — PROGRESS NOTES
Name: Adolfo Savage  : 1960         Chief Complaint:     Chief Complaint   Patient presents with    Shoulder Pain     left,        History of Present Illness:      Adolfo Savage is a 62 y.o.  female who presents with Shoulder Pain (left, )      HPI    L shoulder pain worsened. Has missed a lot of work. Worst in posterior shoulder and bothers with lifting the arm. Radiates to posterior upper arm but no further. 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 This really flared things up. The shoulder is painful at work and then aches after work. It gets a little better when she has a day off, but then the pain quickly returns when she works again. There is no availability of a light duty job for her. She is doing the home exercises that I had prescribed, using Tylenol, sometimes ice or heat, occasional Advil 2 Liqui-Gels.  had given her steroid injection in the shoulder and she says that only helped for a couple days. HTN - still on HCTZ 25 mg daily but didn't sleep well last night, has had more coffee and cigarettes than usual.  She is trying to get more healthy, try to work on quitting smoking but does not feel she will be able to completely quit until after she gets the shoulder issue resolved. Medical History:     Patient Active Problem List   Diagnosis    Chronic eczematous otitis externa of both ears    Essential hypertension       Medications:       Prior to Admission medications    Medication Sig Start Date End Date Taking?  Authorizing Provider   ibuprofen (ADVIL;MOTRIN) 600 MG tablet Take 1 tablet by mouth 3 times daily as needed for Pain 3/26/21  Yes Ozzie Vega, DO   hydroCHLOROthiazide (HYDRODIURIL) 25 MG tablet Take 1 tablet by mouth every morning 21  Yes Ozzie Vega, DO   fluocinolone (DERMOTIC) 0.01 % OIL oil Put 4 drops into the affected ear(s) twice daily x 2 weeks. After that can decrease use to 1-2 times a week for maintenance. 1/4/21  Yes AGNES Harrell   albuterol sulfate  (90 Base) MCG/ACT inhaler Inhale 2 puffs into the lungs 4 times daily as needed for Wheezing 11/14/19  Yes Levi Sprague APRN - CNP   Probiotic Product (PROBIOTIC-10) CAPS Take by mouth daily   Yes Historical Provider, MD   famotidine (PEPCID) 20 MG tablet Take 20 mg by mouth 2 times daily   Yes Historical Provider, MD   Multiple Vitamin (MULTI-VITAMIN DAILY PO) Take by mouth   Yes Historical Provider, MD   Ascorbic Acid (VITAMIN C) 250 MG tablet Take 250 mg by mouth daily   Yes Historical Provider, MD   Cholecalciferol (VITAMIN D3) 2000 units CAPS Take by mouth   Yes Historical Provider, MD   acetaminophen (TYLENOL) 325 MG tablet Take 325 mg by mouth every 6 hours as needed for Pain   Yes Historical Provider, MD   diphenhydrAMINE (BENADRYL) 25 MG tablet Take 25 mg by mouth every 6 hours as needed for Itching   Yes Historical Provider, MD        Allergies: Other    Review ofSystems:     Positive and Negative as described in HPI    Review of Systems    Physical Exam:     Vitals:  BP (!) 168/78   Pulse 102   Ht 5' 6\" (1.676 m)   Wt 105 lb (47.6 kg)   LMP  (LMP Unknown)   SpO2 98%   BMI 16.95 kg/m²   Physical Exam  Constitutional:       Appearance: Normal appearance. Musculoskeletal:      Comments: Left shoulder normal range of motion. Pain with empty can and Noel. Negative Spurling. Normal strength bilateral hands. Neurological:      Mental Status: She is alert.          Data:     Lab Results   Component Value Date     01/03/2020    K 4.7 01/03/2020     01/03/2020    CO2 26 01/03/2020    BUN 17 01/03/2020    CREATININE 0.76 01/03/2020    GLUCOSE 100 01/03/2020    PROT 7.9 01/03/2020    LABALBU 4.7 01/03/2020    BILITOT 0.58 01/03/2020    ALKPHOS 98 01/03/2020    AST 23 01/03/2020    ALT 25 01/03/2020     No results found for: WBC, RBC, HGB, HCT, MCV, MCH, MCHC, RDW, PLT, MPV  No results found for: TSH  Lab Results   Component Value Date    CHOL 235 01/03/2020    HDL 68 01/03/2020    LABA1C 5.3 01/03/2020         Assessment & Plan:        Diagnosis Orders   1. Chronic left shoulder pain  MRI SHOULDER LEFT WO CONTRAST   Left shoulder pain for a number of years and has worsened in the last few months. Failed home exercises, over-the-counter NSAIDs, corticosteroid injection. Pain interfering with work and other activities. MRI ordered for further evaluation. Higher strength ibuprofen prescribed. Continue home exercises. Requested Prescriptions     Signed Prescriptions Disp Refills    ibuprofen (ADVIL;MOTRIN) 600 MG tablet 60 tablet 2     Sig: Take 1 tablet by mouth 3 times daily as needed for Pain         Patient Instructions   SURVEY:    You may be receiving a survey from [employer] regarding your visit today. You may get this in the mail, through your MyChart or in your email. Please complete the survey to enable us to provide the highest quality of care to you and your family. If you cannot score us as very good ( 5 Stars) on any question, please feel free to call the office to discuss how we could have made your experience exceptional.     Thank you. Clinical Care Team:  Dr. Suki Nazario,                                            [facility], [address] [place] [address] Team:  [address] [place] [address] Box  received counseling on the following healthy behaviors: medication adherence  Reviewed prior labs and health maintenance. Continue current medications, diet and exercise. Discussed use, benefit, and side effects of prescribed medications. Barriers to medication compliance addressed. Patient given educational materials - see patient instructions. All patient questions answered.   Patient voiced understanding.        Electronically signed by Colin Simpson DO on 3/28/2021 at 11:53 PM  [address]  Dept: [phone]

## 2021-03-31 ENCOUNTER — HOSPITAL ENCOUNTER (OUTPATIENT)
Dept: MRI IMAGING | Age: 61
Discharge: HOME | End: 2021-04-02
Payer: COMMERCIAL

## 2021-03-31 DIAGNOSIS — G89.29 CHRONIC LEFT SHOULDER PAIN: ICD-10-CM

## 2021-03-31 DIAGNOSIS — M25.512 CHRONIC LEFT SHOULDER PAIN: ICD-10-CM

## 2021-03-31 PROCEDURE — 73221 MRI JOINT UPR EXTREM W/O DYE: CPT

## 2021-04-01 ENCOUNTER — TELEPHONE (OUTPATIENT)
Dept: FAMILY MEDICINE CLINIC | Age: 61
End: 2021-04-01

## 2021-04-01 DIAGNOSIS — M19.012 ARTHRITIS OF LEFT SHOULDER REGION: ICD-10-CM

## 2021-04-01 DIAGNOSIS — G89.29 CHRONIC LEFT SHOULDER PAIN: Primary | ICD-10-CM

## 2021-04-01 DIAGNOSIS — M75.52 BURSITIS OF LEFT SHOULDER: ICD-10-CM

## 2021-04-01 DIAGNOSIS — S46.012A TRAUMATIC INCOMPLETE TEAR OF LEFT ROTATOR CUFF, INITIAL ENCOUNTER: ICD-10-CM

## 2021-04-01 DIAGNOSIS — M25.512 CHRONIC LEFT SHOULDER PAIN: Primary | ICD-10-CM

## 2021-04-01 DIAGNOSIS — M75.112 INCOMPLETE TEAR OF LEFT ROTATOR CUFF, UNSPECIFIED WHETHER TRAUMATIC: ICD-10-CM

## 2021-04-01 NOTE — TELEPHONE ENCOUNTER
----- Message from Bertin Knight DO sent at 4/1/2021 12:24 PM EDT -----  Patient has some partial tears in the rotator cuff. The rotator cuff has 4 muscles, and these are located in one muscle and then at the conjunction of 2 other muscles. There is some inflammation around these areas. Also has some tendon damage on the biceps tendon and a little bit of arthritis and bursitis.   Recommend Ortho referral.

## 2021-04-19 ENCOUNTER — HOSPITAL ENCOUNTER (INPATIENT)
Age: 61
LOS: 1 days | Discharge: HOME | DRG: 065 | End: 2021-04-21
Attending: EMERGENCY MEDICINE | Admitting: INTERNAL MEDICINE
Payer: COMMERCIAL

## 2021-04-19 ENCOUNTER — APPOINTMENT (OUTPATIENT)
Dept: CT IMAGING | Age: 61
DRG: 065 | End: 2021-04-19
Payer: COMMERCIAL

## 2021-04-19 DIAGNOSIS — R47.81 SLURRED SPEECH: Primary | ICD-10-CM

## 2021-04-19 DIAGNOSIS — I63.9 CEREBROVASCULAR ACCIDENT (CVA), UNSPECIFIED MECHANISM (HCC): ICD-10-CM

## 2021-04-19 DIAGNOSIS — E87.1 HYPONATREMIA: ICD-10-CM

## 2021-04-19 DIAGNOSIS — I10 ESSENTIAL HYPERTENSION: ICD-10-CM

## 2021-04-19 DIAGNOSIS — G45.9 TIA (TRANSIENT ISCHEMIC ATTACK): ICD-10-CM

## 2021-04-19 LAB
-: ABNORMAL
ABSOLUTE EOS #: 0.4 K/UL (ref 0–0.4)
ABSOLUTE IMMATURE GRANULOCYTE: ABNORMAL K/UL (ref 0–0.3)
ABSOLUTE LYMPH #: 2.2 K/UL (ref 1–4.8)
ABSOLUTE MONO #: 0.8 K/UL (ref 0–1)
ALBUMIN SERPL-MCNC: 4.4 G/DL (ref 3.5–5.2)
ALBUMIN/GLOBULIN RATIO: ABNORMAL (ref 1–2.5)
ALP BLD-CCNC: 102 U/L (ref 35–104)
ALT SERPL-CCNC: 23 U/L (ref 5–33)
AMORPHOUS: ABNORMAL
ANION GAP SERPL CALCULATED.3IONS-SCNC: 9 MMOL/L (ref 9–17)
AST SERPL-CCNC: 24 U/L
BACTERIA: ABNORMAL
BASOPHILS # BLD: 0 % (ref 0–2)
BASOPHILS ABSOLUTE: 0 K/UL (ref 0–0.2)
BILIRUB SERPL-MCNC: 0.24 MG/DL (ref 0.3–1.2)
BILIRUBIN URINE: NEGATIVE
BUN BLDV-MCNC: 16 MG/DL (ref 8–23)
BUN/CREAT BLD: ABNORMAL (ref 9–20)
CALCIUM SERPL-MCNC: 9.4 MG/DL (ref 8.6–10.4)
CASTS UA: ABNORMAL /LPF
CHLORIDE BLD-SCNC: 89 MMOL/L (ref 98–107)
CO2: 29 MMOL/L (ref 20–31)
COLOR: YELLOW
COMMENT UA: ABNORMAL
CREAT SERPL-MCNC: 0.69 MG/DL (ref 0.5–0.9)
CRYSTALS, UA: ABNORMAL /HPF
DIFFERENTIAL TYPE: YES
EOSINOPHILS RELATIVE PERCENT: 6 % (ref 0–5)
EPITHELIAL CELLS UA: ABNORMAL /HPF
GFR AFRICAN AMERICAN: >60 ML/MIN
GFR NON-AFRICAN AMERICAN: >60 ML/MIN
GFR SERPL CREATININE-BSD FRML MDRD: ABNORMAL ML/MIN/{1.73_M2}
GFR SERPL CREATININE-BSD FRML MDRD: ABNORMAL ML/MIN/{1.73_M2}
GLUCOSE BLD-MCNC: 92 MG/DL
GLUCOSE BLD-MCNC: 92 MG/DL (ref 65–99)
GLUCOSE BLD-MCNC: 97 MG/DL (ref 70–99)
GLUCOSE URINE: NEGATIVE
HCT VFR BLD CALC: 42.4 % (ref 36–46)
HEMOGLOBIN: 14.7 G/DL (ref 12–16)
IMMATURE GRANULOCYTES: ABNORMAL %
KETONES, URINE: NEGATIVE
LEUKOCYTE ESTERASE, URINE: NEGATIVE
LYMPHOCYTES # BLD: 33 % (ref 15–40)
MCH RBC QN AUTO: 31.3 PG (ref 26–34)
MCHC RBC AUTO-ENTMCNC: 34.6 G/DL (ref 31–37)
MCV RBC AUTO: 90.3 FL (ref 80–100)
MONOCYTES # BLD: 12 % (ref 4–8)
MUCUS: ABNORMAL
NITRITE, URINE: POSITIVE
NRBC AUTOMATED: ABNORMAL PER 100 WBC
OTHER OBSERVATIONS UA: ABNORMAL
PDW BLD-RTO: 14 % (ref 12.1–15.2)
PH UA: 7 (ref 5–8)
PLATELET # BLD: 322 K/UL (ref 140–450)
PLATELET ESTIMATE: ABNORMAL
PMV BLD AUTO: ABNORMAL FL (ref 6–12)
POTASSIUM SERPL-SCNC: 4.5 MMOL/L (ref 3.7–5.3)
PROTEIN UA: NEGATIVE
RBC # BLD: 4.69 M/UL (ref 4–5.2)
RBC # BLD: ABNORMAL 10*6/UL
RBC UA: ABNORMAL /HPF (ref 0–2)
RENAL EPITHELIAL, UA: ABNORMAL /HPF
SARS-COV-2, RAPID: NOT DETECTED
SEG NEUTROPHILS: 49 % (ref 47–75)
SEGMENTED NEUTROPHILS ABSOLUTE COUNT: 3.3 K/UL (ref 2.5–7)
SODIUM BLD-SCNC: 127 MMOL/L (ref 135–144)
SPECIFIC GRAVITY UA: 1.01 (ref 1–1.03)
SPECIMEN DESCRIPTION: NORMAL
TOTAL PROTEIN: 7.6 G/DL (ref 6.4–8.3)
TRICHOMONAS: ABNORMAL
TROPONIN INTERP: NORMAL
TROPONIN T: NORMAL NG/ML
TROPONIN, HIGH SENSITIVITY: <6 NG/L (ref 0–14)
TSH SERPL DL<=0.05 MIU/L-ACNC: 2.05 MIU/L (ref 0.3–5)
TURBIDITY: CLEAR
URINE HGB: ABNORMAL
UROBILINOGEN, URINE: NORMAL
WBC # BLD: 6.7 K/UL (ref 3.5–11)
WBC # BLD: ABNORMAL 10*3/UL
WBC UA: ABNORMAL /HPF
YEAST: ABNORMAL

## 2021-04-19 PROCEDURE — 80053 COMPREHEN METABOLIC PANEL: CPT

## 2021-04-19 PROCEDURE — 6370000000 HC RX 637 (ALT 250 FOR IP): Performed by: EMERGENCY MEDICINE

## 2021-04-19 PROCEDURE — 99285 EMERGENCY DEPT VISIT HI MDM: CPT

## 2021-04-19 PROCEDURE — G0378 HOSPITAL OBSERVATION PER HR: HCPCS

## 2021-04-19 PROCEDURE — 94761 N-INVAS EAR/PLS OXIMETRY MLT: CPT

## 2021-04-19 PROCEDURE — 2580000003 HC RX 258: Performed by: EMERGENCY MEDICINE

## 2021-04-19 PROCEDURE — 6370000000 HC RX 637 (ALT 250 FOR IP): Performed by: INTERNAL MEDICINE

## 2021-04-19 PROCEDURE — 87186 SC STD MICRODIL/AGAR DIL: CPT

## 2021-04-19 PROCEDURE — 70450 CT HEAD/BRAIN W/O DYE: CPT

## 2021-04-19 PROCEDURE — 82947 ASSAY GLUCOSE BLOOD QUANT: CPT

## 2021-04-19 PROCEDURE — 96374 THER/PROPH/DIAG INJ IV PUSH: CPT

## 2021-04-19 PROCEDURE — 81001 URINALYSIS AUTO W/SCOPE: CPT

## 2021-04-19 PROCEDURE — 84443 ASSAY THYROID STIM HORMONE: CPT

## 2021-04-19 PROCEDURE — 87088 URINE BACTERIA CULTURE: CPT

## 2021-04-19 PROCEDURE — 87635 SARS-COV-2 COVID-19 AMP PRB: CPT

## 2021-04-19 PROCEDURE — 2580000003 HC RX 258: Performed by: INTERNAL MEDICINE

## 2021-04-19 PROCEDURE — 6360000002 HC RX W HCPCS: Performed by: INTERNAL MEDICINE

## 2021-04-19 PROCEDURE — 96372 THER/PROPH/DIAG INJ SC/IM: CPT

## 2021-04-19 PROCEDURE — 93005 ELECTROCARDIOGRAM TRACING: CPT | Performed by: EMERGENCY MEDICINE

## 2021-04-19 PROCEDURE — 87086 URINE CULTURE/COLONY COUNT: CPT

## 2021-04-19 PROCEDURE — 85025 COMPLETE CBC W/AUTO DIFF WBC: CPT

## 2021-04-19 PROCEDURE — 84484 ASSAY OF TROPONIN QUANT: CPT

## 2021-04-19 RX ORDER — HYDROCHLOROTHIAZIDE 25 MG/1
25 TABLET ORAL EVERY MORNING
Status: CANCELLED | OUTPATIENT
Start: 2021-04-20

## 2021-04-19 RX ORDER — NICOTINE 21 MG/24HR
1 PATCH, TRANSDERMAL 24 HOURS TRANSDERMAL DAILY
Status: DISCONTINUED | OUTPATIENT
Start: 2021-04-19 | End: 2021-04-21 | Stop reason: HOSPADM

## 2021-04-19 RX ORDER — AMLODIPINE BESYLATE 5 MG/1
5 TABLET ORAL DAILY
Qty: 30 TABLET | Refills: 0 | Status: SHIPPED | OUTPATIENT
Start: 2021-04-19 | End: 2021-04-22 | Stop reason: SDUPTHER

## 2021-04-19 RX ORDER — IBUPROFEN 600 MG/1
600 TABLET ORAL 3 TIMES DAILY PRN
Status: DISCONTINUED | OUTPATIENT
Start: 2021-04-19 | End: 2021-04-21 | Stop reason: HOSPADM

## 2021-04-19 RX ORDER — ALBUTEROL SULFATE 90 UG/1
2 AEROSOL, METERED RESPIRATORY (INHALATION) 4 TIMES DAILY PRN
Status: DISCONTINUED | OUTPATIENT
Start: 2021-04-19 | End: 2021-04-21 | Stop reason: HOSPADM

## 2021-04-19 RX ORDER — ASPIRIN 81 MG/1
243 TABLET, CHEWABLE ORAL ONCE
Status: COMPLETED | OUTPATIENT
Start: 2021-04-19 | End: 2021-04-19

## 2021-04-19 RX ORDER — MULTIVITAMIN WITH IRON
1 TABLET ORAL DAILY
Status: DISCONTINUED | OUTPATIENT
Start: 2021-04-19 | End: 2021-04-21 | Stop reason: HOSPADM

## 2021-04-19 RX ORDER — SODIUM CHLORIDE 0.9 % (FLUSH) 0.9 %
5-40 SYRINGE (ML) INJECTION PRN
Status: DISCONTINUED | OUTPATIENT
Start: 2021-04-19 | End: 2021-04-21 | Stop reason: HOSPADM

## 2021-04-19 RX ORDER — FAMOTIDINE 20 MG/1
20 TABLET, FILM COATED ORAL 2 TIMES DAILY
Status: DISCONTINUED | OUTPATIENT
Start: 2021-04-19 | End: 2021-04-21 | Stop reason: HOSPADM

## 2021-04-19 RX ORDER — 0.9 % SODIUM CHLORIDE 0.9 %
1000 INTRAVENOUS SOLUTION INTRAVENOUS ONCE
Status: COMPLETED | OUTPATIENT
Start: 2021-04-19 | End: 2021-04-19

## 2021-04-19 RX ORDER — LABETALOL HYDROCHLORIDE 5 MG/ML
5 INJECTION, SOLUTION INTRAVENOUS EVERY 4 HOURS PRN
Status: DISCONTINUED | OUTPATIENT
Start: 2021-04-19 | End: 2021-04-21 | Stop reason: HOSPADM

## 2021-04-19 RX ORDER — ONDANSETRON 2 MG/ML
4 INJECTION INTRAMUSCULAR; INTRAVENOUS EVERY 6 HOURS PRN
Status: DISCONTINUED | OUTPATIENT
Start: 2021-04-19 | End: 2021-04-21 | Stop reason: HOSPADM

## 2021-04-19 RX ORDER — ASPIRIN 81 MG/1
81 TABLET, CHEWABLE ORAL DAILY
Qty: 30 TABLET | Refills: 3 | Status: SHIPPED | OUTPATIENT
Start: 2021-04-19 | End: 2021-04-21 | Stop reason: HOSPADM

## 2021-04-19 RX ORDER — SODIUM CHLORIDE 0.9 % (FLUSH) 0.9 %
5-40 SYRINGE (ML) INJECTION EVERY 12 HOURS SCHEDULED
Status: DISCONTINUED | OUTPATIENT
Start: 2021-04-19 | End: 2021-04-21 | Stop reason: HOSPADM

## 2021-04-19 RX ORDER — LOSARTAN POTASSIUM 50 MG/1
25 TABLET ORAL DAILY
Status: DISCONTINUED | OUTPATIENT
Start: 2021-04-19 | End: 2021-04-21

## 2021-04-19 RX ORDER — ASCORBIC ACID 500 MG
250 TABLET ORAL DAILY
Status: DISCONTINUED | OUTPATIENT
Start: 2021-04-19 | End: 2021-04-21 | Stop reason: HOSPADM

## 2021-04-19 RX ORDER — ACETAMINOPHEN 325 MG/1
650 TABLET ORAL EVERY 6 HOURS PRN
Status: DISCONTINUED | OUTPATIENT
Start: 2021-04-19 | End: 2021-04-21 | Stop reason: HOSPADM

## 2021-04-19 RX ORDER — POLYETHYLENE GLYCOL 3350 17 G/17G
17 POWDER, FOR SOLUTION ORAL DAILY PRN
Status: DISCONTINUED | OUTPATIENT
Start: 2021-04-19 | End: 2021-04-21 | Stop reason: HOSPADM

## 2021-04-19 RX ORDER — ASPIRIN 81 MG/1
81 TABLET, CHEWABLE ORAL DAILY
Status: DISCONTINUED | OUTPATIENT
Start: 2021-04-19 | End: 2021-04-21 | Stop reason: HOSPADM

## 2021-04-19 RX ORDER — SODIUM CHLORIDE 9 MG/ML
25 INJECTION, SOLUTION INTRAVENOUS PRN
Status: DISCONTINUED | OUTPATIENT
Start: 2021-04-19 | End: 2021-04-21 | Stop reason: HOSPADM

## 2021-04-19 RX ORDER — VITAMIN B COMPLEX
2000 TABLET ORAL DAILY
Status: DISCONTINUED | OUTPATIENT
Start: 2021-04-19 | End: 2021-04-21 | Stop reason: HOSPADM

## 2021-04-19 RX ORDER — ACETAMINOPHEN 650 MG/1
650 SUPPOSITORY RECTAL EVERY 6 HOURS PRN
Status: DISCONTINUED | OUTPATIENT
Start: 2021-04-19 | End: 2021-04-21 | Stop reason: HOSPADM

## 2021-04-19 RX ORDER — PROMETHAZINE HYDROCHLORIDE 25 MG/1
12.5 TABLET ORAL EVERY 6 HOURS PRN
Status: DISCONTINUED | OUTPATIENT
Start: 2021-04-19 | End: 2021-04-21 | Stop reason: HOSPADM

## 2021-04-19 RX ADMIN — LOSARTAN POTASSIUM 25 MG: 50 TABLET ORAL at 20:58

## 2021-04-19 RX ADMIN — ACETAMINOPHEN 650 MG: 325 TABLET, FILM COATED ORAL at 20:57

## 2021-04-19 RX ADMIN — SODIUM CHLORIDE 1000 ML: 9 INJECTION, SOLUTION INTRAVENOUS at 15:36

## 2021-04-19 RX ADMIN — SODIUM CHLORIDE, PRESERVATIVE FREE 10 ML: 5 INJECTION INTRAVENOUS at 21:00

## 2021-04-19 RX ADMIN — FAMOTIDINE 20 MG: 20 TABLET ORAL at 20:58

## 2021-04-19 RX ADMIN — ENOXAPARIN SODIUM 40 MG: 40 INJECTION SUBCUTANEOUS at 20:59

## 2021-04-19 RX ADMIN — CEFTRIAXONE SODIUM 1000 MG: 1 INJECTION, POWDER, FOR SOLUTION INTRAMUSCULAR; INTRAVENOUS at 20:59

## 2021-04-19 RX ADMIN — ASPIRIN 243 MG: 81 TABLET, CHEWABLE ORAL at 16:55

## 2021-04-19 ASSESSMENT — PAIN SCALES - GENERAL
PAINLEVEL_OUTOF10: 3
PAINLEVEL_OUTOF10: 0

## 2021-04-19 NOTE — ED NOTES
Patient stands up at bedside c/o dizziness, transported in [facility] to BR, collected UA, patient c/o HA, DR notified.      Nupur uRssell, RN  04/19/21 8845

## 2021-04-19 NOTE — ED NOTES
Called son, Dhara Junior, update given on patient, no questions at this time.       Kayce Rodriguez RN  04/19/21 1032

## 2021-04-19 NOTE — ED NOTES
Dr. Juventino Ham speaks with patient, offers to admit patient to the hospital for further testing. Pt refuses to stay and states, \"I want to go home and follow up with my doctor. \"       Raymundo Rivera RN  04/19/21 5159

## 2021-04-19 NOTE — ED PROVIDER NOTES
[address] COMPLAINT    Chief Complaint   Patient presents with    Aphasia     Patient had slurred speech, left hand and arm numbness, states has been feeling dizzy since yesterday, BP has been elevated. MARIBEL Swanson is a 62 y.o. female who presentsto ED from home. By EMS. With complaint of left hand numbness and slurring of speech. Blood pressure has been elevated  Onset since yesterday. Patient was brought to ED by EMS with symptoms of left hand numbness and slurred speech. Patient is a smoker. Patient has history of hypertension. Patient also had left shoulder problems for which she is taking meloxicam.  Patient denies chest pain denies shortness of breath. Patient denies fever chills. Patient denies nausea or vomiting. Patient denies abdominal pain. PAST MEDICAL HISTORY    Past Medical History:   Diagnosis Date    Allergic rhinitis     dust mites and nickel    Essential hypertension 2021       SURGICAL HISTORY    Past Surgical History:   Procedure Laterality Date     SECTION      2nd in 1 [facility]    Current Outpatient Rx   Medication Sig Dispense Refill    MELOXICAM PO Take 15 mLs by mouth Daily in the evening with dinner      aspirin (ASPIRIN CHILDRENS) 81 MG chewable tablet Take 1 tablet by mouth daily 30 tablet 3    amLODIPine (NORVASC) 5 MG tablet Take 1 tablet by mouth daily 30 tablet 0    ibuprofen (ADVIL;MOTRIN) 600 MG tablet Take 1 tablet by mouth 3 times daily as needed for Pain 60 tablet 2    hydroCHLOROthiazide (HYDRODIURIL) 25 MG tablet Take 1 tablet by mouth every morning 30 tablet 5    fluocinolone (DERMOTIC) 0.01 % OIL oil Put 4 drops into the affected ear(s) twice daily x 2 weeks. After that can decrease use to 1-2 times a week for maintenance.  20 mL 2    albuterol sulfate  (90 Base) MCG/ACT inhaler Inhale 2 puffs into the lungs 4 times daily as needed for Wheezing 1 Inhaler 2  Probiotic Product (PROBIOTIC-10) CAPS Take by mouth daily      famotidine (PEPCID) 20 MG tablet Take 20 mg by mouth 2 times daily      Multiple Vitamin (MULTI-VITAMIN DAILY PO) Take by mouth      Ascorbic Acid (VITAMIN C) 250 MG tablet Take 250 mg by mouth daily      Cholecalciferol (VITAMIN D3) 2000 units CAPS Take by mouth      acetaminophen (TYLENOL) 325 MG tablet Take 325 mg by mouth every 6 hours as needed for Pain      diphenhydrAMINE (BENADRYL) 25 MG tablet Take 25 mg by mouth every 6 hours as needed for Itching         ALLERGIES    Allergies   Allergen Reactions    Other      Neoprene       FAMILY HISTORY    History reviewed. No pertinent family history. SOCIAL HISTORY    Social History     Socioeconomic History    Marital status:       Spouse name: None    Number of children: None    Years of education: None    Highest education level: None   Occupational History    None   Social Needs    Financial resource strain: Not hard at all   Leonidas-Lu insecurity     Worry: Never true     Inability: Never true   [employer] needs     Medical: None     Non-medical: None   Tobacco Use    Smoking status: Current Every Day Smoker     Packs/day: 0.50     Years: 30.00     Pack years: 15.00    Smokeless tobacco: Never Used   Substance and Sexual Activity    Alcohol use: No    Drug use: No    Sexual activity: None   Lifestyle    Physical activity     Days per week: None     Minutes per session: None    Stress: None   Relationships    Social connections     Talks on phone: None     Gets together: None     Attends Jewish service: None     Active member of club or organization: None     Attends meetings of clubs or organizations: None     Relationship status: None    Intimate partner violence     Fear of current or ex partner: None     Emotionally abused: None     Physically abused: None     Forced sexual activity: None   Other Topics Concern    None   Social History Narrative    None Review of Systems:  Constitutional: Did not feel right  Eyes:  Denies photophobia or discharge   HENT:  Denies sore throat or ear pain   Respiratory:  Denies cough or shortness of breath   Cardiovascular:  Denies chest pain, palpitations or swelling   GI:  Denies abdominal pain, nausea, vomiting, or diarrhea   Musculoskeletal:  Denies back pain   Skin:  Denies rash   Neurologic: Positive for \"abnormal speech\". Endocrine:  Denies polyuria or polydypsia   Lymphatic:  Denies swollen glands   Psychiatric:  Denies depression, suicidal ideation or homicidal ideation   All systems negative except as marked. PHYSICAL EXAM    VITAL SIGNS: BP (!) 185/87   Pulse 84   Temp 98.1 °F (36.7 °C) (Oral)   Resp 18   Ht 5' 6\" (1.676 m)   Wt 105 lb (47.6 kg)   LMP  (LMP Unknown)   SpO2 99%   BMI 16.95 kg/m²    Constitutional:  Well developed, Well nourished, No acute distress, Non-toxic appearance. HENT:  Normocephalic, Atraumatic, Bilateral external ears normal, Oropharynx moist, No oral exudates, Nose normal. Neck- Normal range of motion, No tenderness, Supple, No stridor. Eyes:  PERRL, EOMI, Conjunctiva normal, No discharge. Respiratory:  Normal breath sounds, No respiratory distress, No wheezing, No chest tenderness. Cardiovascular:  Normal heart rate, Normal rhythm, No murmurs, No rubs, No gallops. GI:  Bowel sounds normal, Soft, No tenderness, No masses, No pulsatile masses. : External genitalia appear normal, No masses or lesions. No discharge. No CVA tenderness. Musculoskeletal:  Intact distal pulses, No edema, No tenderness, No cyanosis, No clubbing. Good range of motion in all major joints. No tenderness to palpation or major deformities noted. Back- No tenderness. Integument:  Warm, Dry, No erythema, No rash. Lymphatic:  No lymphadenopathy noted. Neurologic:  Alert & oriented x 3, Normal motor function, Normal sensory function, No focal deficits noted.    Psychiatric:  Affect normal, Judgment normal, Mood normal.     EKG    Sinus rhythm rate of 77, nonspecific ST changes. RADIOLOGY    CT HEAD WO CONTRAST   Final Result         1. No bleed mass or midline shift. 2. Minimal mucosal thickening left ethmoid air cells. PROCEDURES        Labs  Labs Reviewed   CBC WITH AUTO DIFFERENTIAL - Abnormal; Notable for the following components:       Result Value    Monocytes 12 (*)     Eosinophils % 6 (*)     All other components within normal limits   COMPREHENSIVE METABOLIC PANEL - Abnormal; Notable for the following components:    Sodium 127 (*)     Chloride 89 (*)     Total Bilirubin 0.24 (*)     All other components within normal limits   URINALYSIS - Abnormal; Notable for the following components:    Urine Hgb TRACE (*)     Nitrite, Urine POSITIVE (*)     All other components within normal limits   MICROSCOPIC URINALYSIS - Abnormal; Notable for the following components:    Bacteria, UA 3+ (*)     All other components within normal limits   POCT GLUCOSE - Normal   COVID-19, RAPID   CULTURE, URINE   GLUCOSE, WHOLE BLOOD   TROPONIN             Summation      Patient Course: Patient feels better and wants to go home. Patient has multiple risks for vascular disease. Patient is a smoker. Patient has hypertension. Hospitalization is recommended and discussed. Patient initially refused hospitalization. Later on patient agrees. Patient is discussed with Dr. Anette Marques. Patient will be admitted for further evaluation and treatment.     ED Medications administered this visit:    Medications   0.9 % sodium chloride bolus (0 mLs Intravenous Stopped 4/19/21 1638)   aspirin chewable tablet 243 mg (243 mg Oral Given 4/19/21 1655)       New Prescriptions from this visit:    New Prescriptions    AMLODIPINE (NORVASC) 5 MG TABLET    Take 1 tablet by mouth daily    ASPIRIN (ASPIRIN CHILDRENS) 81 MG CHEWABLE TABLET    Take 1 tablet by mouth daily       Follow-up:  Saúl Zavala DO  [address] [facility]  [phone]    In 2 days  Return to ED if worse. Aj Avelar, DO  [address] [phone]    In 2 days  Return to ED if worse        Final Impression:   1. Slurred speech    2. Essential hypertension    3.  TIA (transient ischemic attack)               (Please note that portions of this note were completed with a voice recognition program.  Efforts were made to edit the dictations but occasionally words are mis-transcribed.)          Mars Pearson MD  04/19/21 3731

## 2021-04-20 ENCOUNTER — APPOINTMENT (OUTPATIENT)
Dept: MRI IMAGING | Age: 61
DRG: 065 | End: 2021-04-20
Payer: COMMERCIAL

## 2021-04-20 ENCOUNTER — APPOINTMENT (OUTPATIENT)
Dept: VASCULAR LAB | Age: 61
DRG: 065 | End: 2021-04-20
Payer: COMMERCIAL

## 2021-04-20 PROBLEM — E87.1 HYPONATREMIA: Status: ACTIVE | Noted: 2021-04-20

## 2021-04-20 LAB
ANION GAP SERPL CALCULATED.3IONS-SCNC: 11 MMOL/L (ref 9–17)
ANION GAP SERPL CALCULATED.3IONS-SCNC: 9 MMOL/L (ref 9–17)
BUN BLDV-MCNC: 13 MG/DL (ref 8–23)
BUN BLDV-MCNC: 15 MG/DL (ref 8–23)
BUN/CREAT BLD: 20 (ref 9–20)
BUN/CREAT BLD: 28 (ref 9–20)
CALCIUM SERPL-MCNC: 9.4 MG/DL (ref 8.6–10.4)
CALCIUM SERPL-MCNC: 9.4 MG/DL (ref 8.6–10.4)
CHLORIDE BLD-SCNC: 90 MMOL/L (ref 98–107)
CHLORIDE BLD-SCNC: 90 MMOL/L (ref 98–107)
CO2: 24 MMOL/L (ref 20–31)
CO2: 27 MMOL/L (ref 20–31)
CREAT SERPL-MCNC: 0.53 MG/DL (ref 0.5–0.9)
CREAT SERPL-MCNC: 0.64 MG/DL (ref 0.5–0.9)
EKG ATRIAL RATE: 77 BPM
EKG P AXIS: 71 DEGREES
EKG P-R INTERVAL: 146 MS
EKG Q-T INTERVAL: 394 MS
EKG QRS DURATION: 76 MS
EKG QTC CALCULATION (BAZETT): 445 MS
EKG R AXIS: 69 DEGREES
EKG T AXIS: 64 DEGREES
EKG VENTRICULAR RATE: 77 BPM
GFR AFRICAN AMERICAN: >60 ML/MIN
GFR AFRICAN AMERICAN: >60 ML/MIN
GFR NON-AFRICAN AMERICAN: >60 ML/MIN
GFR NON-AFRICAN AMERICAN: >60 ML/MIN
GFR SERPL CREATININE-BSD FRML MDRD: ABNORMAL ML/MIN/{1.73_M2}
GLUCOSE BLD-MCNC: 114 MG/DL (ref 70–99)
GLUCOSE BLD-MCNC: 116 MG/DL (ref 70–99)
LV EF: 55 %
LVEF MODALITY: NORMAL
OSMOLALITY URINE: 425 MOSM/KG (ref 80–1300)
POTASSIUM SERPL-SCNC: 4.2 MMOL/L (ref 3.7–5.3)
POTASSIUM SERPL-SCNC: 4.3 MMOL/L (ref 3.7–5.3)
SERUM OSMOLALITY: 268 MOSM/KG (ref 275–295)
SODIUM BLD-SCNC: 125 MMOL/L (ref 135–144)
SODIUM BLD-SCNC: 126 MMOL/L (ref 135–144)
SODIUM,UR: 67 MMOL/L

## 2021-04-20 PROCEDURE — 80061 LIPID PANEL: CPT

## 2021-04-20 PROCEDURE — A9577 INJ MULTIHANCE: HCPCS | Performed by: INTERNAL MEDICINE

## 2021-04-20 PROCEDURE — 6360000004 HC RX CONTRAST MEDICATION: Performed by: INTERNAL MEDICINE

## 2021-04-20 PROCEDURE — 83935 ASSAY OF URINE OSMOLALITY: CPT

## 2021-04-20 PROCEDURE — 83036 HEMOGLOBIN GLYCOSYLATED A1C: CPT

## 2021-04-20 PROCEDURE — 94761 N-INVAS EAR/PLS OXIMETRY MLT: CPT

## 2021-04-20 PROCEDURE — 2580000003 HC RX 258: Performed by: INTERNAL MEDICINE

## 2021-04-20 PROCEDURE — 93880 EXTRACRANIAL BILAT STUDY: CPT

## 2021-04-20 PROCEDURE — 93010 ELECTROCARDIOGRAM REPORT: CPT | Performed by: INTERNAL MEDICINE

## 2021-04-20 PROCEDURE — 1200000000 HC SEMI PRIVATE

## 2021-04-20 PROCEDURE — 70553 MRI BRAIN STEM W/O & W/DYE: CPT

## 2021-04-20 PROCEDURE — 6370000000 HC RX 637 (ALT 250 FOR IP): Performed by: INTERNAL MEDICINE

## 2021-04-20 PROCEDURE — 6360000002 HC RX W HCPCS: Performed by: INTERNAL MEDICINE

## 2021-04-20 PROCEDURE — 36415 COLL VENOUS BLD VENIPUNCTURE: CPT

## 2021-04-20 PROCEDURE — 84300 ASSAY OF URINE SODIUM: CPT

## 2021-04-20 PROCEDURE — 80048 BASIC METABOLIC PNL TOTAL CA: CPT

## 2021-04-20 PROCEDURE — 93306 TTE W/DOPPLER COMPLETE: CPT

## 2021-04-20 PROCEDURE — 83930 ASSAY OF BLOOD OSMOLALITY: CPT

## 2021-04-20 PROCEDURE — 2500000003 HC RX 250 WO HCPCS: Performed by: INTERNAL MEDICINE

## 2021-04-20 RX ORDER — SODIUM CHLORIDE 9 MG/ML
INJECTION, SOLUTION INTRAVENOUS CONTINUOUS
Status: DISCONTINUED | OUTPATIENT
Start: 2021-04-20 | End: 2021-04-21 | Stop reason: HOSPADM

## 2021-04-20 RX ORDER — SODIUM CHLORIDE 1000 MG
1 TABLET, SOLUBLE MISCELLANEOUS 2 TIMES DAILY WITH MEALS
Status: DISCONTINUED | OUTPATIENT
Start: 2021-04-20 | End: 2021-04-21 | Stop reason: HOSPADM

## 2021-04-20 RX ORDER — ATORVASTATIN CALCIUM 40 MG/1
40 TABLET, FILM COATED ORAL NIGHTLY
Status: DISCONTINUED | OUTPATIENT
Start: 2021-04-20 | End: 2021-04-21

## 2021-04-20 RX ORDER — AMLODIPINE BESYLATE 5 MG/1
5 TABLET ORAL DAILY
Status: DISCONTINUED | OUTPATIENT
Start: 2021-04-20 | End: 2021-04-21 | Stop reason: HOSPADM

## 2021-04-20 RX ADMIN — OXYCODONE HYDROCHLORIDE AND ACETAMINOPHEN 250 MG: 500 TABLET ORAL at 11:01

## 2021-04-20 RX ADMIN — ATORVASTATIN CALCIUM 40 MG: 40 TABLET, FILM COATED ORAL at 20:56

## 2021-04-20 RX ADMIN — CHOLECALCIFEROL TAB 25 MCG (1000 UNIT) 2000 UNITS: 25 TAB at 11:00

## 2021-04-20 RX ADMIN — LABETALOL HYDROCHLORIDE 5 MG: 5 INJECTION, SOLUTION INTRAVENOUS at 16:19

## 2021-04-20 RX ADMIN — GADOBENATE DIMEGLUMINE 9 ML: 529 INJECTION, SOLUTION INTRAVENOUS at 09:25

## 2021-04-20 RX ADMIN — THERA TABS 1 TABLET: TAB at 11:00

## 2021-04-20 RX ADMIN — ONDANSETRON 4 MG: 2 INJECTION, SOLUTION INTRAMUSCULAR; INTRAVENOUS at 09:37

## 2021-04-20 RX ADMIN — SODIUM CHLORIDE: 9 INJECTION, SOLUTION INTRAVENOUS at 19:50

## 2021-04-20 RX ADMIN — SODIUM CHLORIDE TAB 1 GM 1 G: 1 TAB at 21:06

## 2021-04-20 RX ADMIN — ASPIRIN 81 MG: 81 TABLET, CHEWABLE ORAL at 11:01

## 2021-04-20 RX ADMIN — ENOXAPARIN SODIUM 40 MG: 40 INJECTION SUBCUTANEOUS at 09:43

## 2021-04-20 RX ADMIN — SODIUM CHLORIDE: 9 INJECTION, SOLUTION INTRAVENOUS at 09:37

## 2021-04-20 RX ADMIN — FAMOTIDINE 20 MG: 20 TABLET ORAL at 11:01

## 2021-04-20 RX ADMIN — ACETAMINOPHEN 650 MG: 325 TABLET, FILM COATED ORAL at 16:19

## 2021-04-20 RX ADMIN — ACETAMINOPHEN 650 MG: 325 TABLET, FILM COATED ORAL at 03:51

## 2021-04-20 RX ADMIN — FAMOTIDINE 20 MG: 20 TABLET ORAL at 20:56

## 2021-04-20 RX ADMIN — LOSARTAN POTASSIUM 25 MG: 50 TABLET ORAL at 11:00

## 2021-04-20 RX ADMIN — AMLODIPINE BESYLATE 5 MG: 5 TABLET ORAL at 11:00

## 2021-04-20 RX ADMIN — CEFTRIAXONE SODIUM 1000 MG: 1 INJECTION, POWDER, FOR SOLUTION INTRAMUSCULAR; INTRAVENOUS at 20:56

## 2021-04-20 ASSESSMENT — PAIN DESCRIPTION - LOCATION: LOCATION: HEAD

## 2021-04-20 ASSESSMENT — PAIN SCALES - GENERAL
PAINLEVEL_OUTOF10: 0
PAINLEVEL_OUTOF10: 3

## 2021-04-20 ASSESSMENT — PAIN DESCRIPTION - PAIN TYPE: TYPE: ACUTE PAIN

## 2021-04-20 NOTE — PLAN OF CARE
Ambulates to BR and then back to bed without incident. Her gait is steady. Denies dizziness or lightheadedness.

## 2021-04-20 NOTE — PLAN OF CARE
Problem: DAILY CARE  Goal: Daily care needs are met  Outcome: Ongoing     Problem: PAIN  Goal: Patient's pain/discomfort is manageable  4/20/2021 1632 by Brayan Catherine RN  Outcome: Ongoing     Problem: Pain:  Goal: Pain level will decrease  Description: Pain level will decrease  Outcome: Ongoing

## 2021-04-20 NOTE — ACP (ADVANCE CARE PLANNING)
Advance Care Planning     Advance Care Planning Activator (Inpatient)  Conversation Note      Date of ACP Conversation: 4/20/21  Conversation Conducted with: Patient with Decision Making Capacity    ACP Activator: [address] [place] [address] Drive Decision Maker:     Current Designated Health Care Decision Maker:     Primary Decision Maker: Laci Greco - [phone]    Secondary Decision Maker: Merlin Sow - [phone]  Click here to complete Healthcare Decision Makers including section of the Healthcare Decision Maker Relationship (ie \"Primary\")  Today we documented Decision Maker(s) consistent with Legal Next of Kin hierarchy. Care Preferences    Ventilation: \"If you were in your present state of health and suddenly became very ill and were unable to breathe on your own, what would your preference be about the use of a ventilator (breathing machine) if it were available to you? \"      Would the patient desire the use of ventilator (breathing machine)?: yes    \"If your health worsens and it becomes clear that your chance of recovery is unlikely, what would your preference be about the use of a ventilator (breathing machine) if it were available to you? \"     Would the patient desire the use of ventilator (breathing machine)?: \"I don't know\"      Resuscitation  \"CPR works best to restart the heart when there is a sudden event, like a heart attack, in someone who is otherwise healthy. Unfortunately, CPR does not typically restart the heart for people who have serious health conditions or who are very sick. \"    \"In the event your heart stopped as a result of an underlying serious health condition, would you want attempts to be made to restart your heart (answer \"yes\" for attempt to resuscitate) or would you prefer a natural death (answer \"no\" for do not attempt to resuscitate)? \" yes       [] Yes   [x] No   Educated Patient / Decision Maker regarding differences between Advance Directives and portable DNR orders.     Length of ACP Conversation in minutes:  10  Conversation Outcomes:  [x] ACP discussion completed  [] Existing advance directive reviewed with patient; no changes to patient's previously recorded wishes  [] New Advance Directive completed  [] Portable Do Not Rescitate prepared for Provider review and signature  [] POLST/POST/MOLST/MOST prepared for Provider review and signature      Follow-up plan:    [] Schedule follow-up conversation to continue planning  [] Referred individual to Provider for additional questions/concerns   [] Advised patient/agent/surrogate to review completed ACP document and update if needed with changes in condition, patient preferences or care setting    [x] This note routed to one or more involved healthcare providers

## 2021-04-20 NOTE — PROGRESS NOTES
Pt awake alert and oriented. Answers questions appropriately. Pt NVC completed with left side of upper lip with slight droop. Pt relates that her boyfriend noticed the droop prior to coming in and states that she feels like speech was slurred. Pt also has had an injury to her left shoulder which was incurred while at work. She states that her job is repetitious and is subsequently off for 5 weeks. She tilts her head slightly to the left however relates that this is not new for her. She has seen Dr. Murtaza Traore for the above. No swallowing deficits noted at this time. Yes

## 2021-04-20 NOTE — PROGRESS NOTES
Quality flow rounds held on 4/20/21     Andreina Ventura is admitted for  TIA (transient ischemic attack). Length of stay 0. Education:    Needed Education: TIA, meds, follow up,diet      Do you have any questions regarding your plan of care while at the hospital? denies    Planned Disposition:               [x]  Home when able                [] Swing Bed                [] ECF/SNF               [] Other/TBD    Barriers to Discharge:    Can you afford your medications? yes   Do you have transportation to follow up appointments? Drives self   Do you need any new equipment at home? none   Current equipment includes   none    Do you have a living will or durable power of  for healthcare? denies               If yes do we have a copy on file? n/a    Do you or your family have any questions or concerns we haven't already discussed? Denies    Lives with boyfriend and her son, plans to return home to [facility]. Works full time, currently on medical leave at [employer]. Denies needs at discharge. Jamaica Rogers and writer present for rounding.

## 2021-04-20 NOTE — H&P
she has been smoking. She has a 15.00 pack-year smoking history. She has never used smokeless tobacco. She reports that she does not drink alcohol or use drugs. Family History: Mother  at the age of 77 from liver disease, she also had a stroke  Father  at the age of 80, had multiple strokes  She has a brother who had stroke    Review of systems:  Constitutional: no fever, no night sweats, no fatigue  Head: Positive for headache, positive dizziness no head injury, no migranes. Eye: no blurring of vision, no double vision. Ears: no hearing difficulty, no tinnitus  Mouth/throat: no sore throat  Lungs: no cough, no shortness of breath, no wheeze  CVS: no palpitation, no chest pain, no shortness of breath  GI: no abdominal pain, no nausea , no vomiting, no constipation  ANTONIO: no dysuria, frequency and urgency, no hematuria  Musculoskeletal: Positive left shoulder joint pain,  Endocrine: no polyuria, polydypsia, no cold or heat intolerence  Hematology: no anemia, no easy brusing or bleeding  Dermatology: no skin rash  Psychiatry: no depression, no anxiety,no panic attacks, no suicide ideation  Neurology: no syncope, no seizures, positive for heaviness and numbness in the left upper extremity/hand, positive for slurry speech and facial droop      Vitals:   Vitals:    21 0723   BP: (!) 152/86   Pulse: 95   Resp: 18   Temp: 97.9 °F (36.6 °C)   SpO2: 99%      BMI: Body mass index is 17.03 kg/m².     Physical Exam:  General Appearance: alert and oriented to person, place and time, in no acute distress  Cardiovascular: normal rate, regular rhythm, normal S1 and S2, no murmurs, rubs, clicks, or gallops, distal pulses intact  Pulmonary/Chest: clear to auscultation bilaterally- no wheezes, rales or rhonchi, normal air movement, no respiratory distress  Abdomen: soft, non-tender, non-distended, normal bowel sounds  Extremities: no cyanosis, clubbing or edema,   Skin: warm and dry, no rash or erythema  Head: normocephalic and atraumatic, oral mucosa moist  Eyes: pupils equal, round, and reactive to light  Neck: supple and non-tender without mass, no thyromegaly   Musculoskeletal: normal range of motion, no joint swelling, deformity or tenderness  Neurological: alert, oriented,  Speech is normal although somewhat slow, no slurring. Noted to have left facial droop.   Muscle strength in upper and lower extremities 5 out of 5, sensation to touch in extremities and face is intact    Review of Labs and Diagnostic Testing:    Recent Results (from the past 24 hour(s))   Glucose, Whole Blood    Collection Time: 04/19/21  1:40 PM   Result Value Ref Range    POC Glucose 92 65 - 99 mg/dL   POCT glucose    Collection Time: 04/19/21  1:47 PM   Result Value Ref Range    Glucose 92 mg/dL   EKG 12 Lead    Collection Time: 04/19/21  1:58 PM   Result Value Ref Range    Ventricular Rate 77 BPM    Atrial Rate 77 BPM    P-R Interval 146 ms    QRS Duration 76 ms    Q-T Interval 394 ms    QTc Calculation (Bazett) 445 ms    P Axis 71 degrees    R Axis 69 degrees    T Axis 64 degrees   CBC Auto Differential    Collection Time: 04/19/21  2:00 PM   Result Value Ref Range    WBC 6.7 3.5 - 11.0 k/uL    RBC 4.69 4.0 - 5.2 m/uL    Hemoglobin 14.7 12.0 - 16.0 g/dL    Hematocrit 42.4 36 - 46 %    MCV 90.3 80 - 100 fL    MCH 31.3 26 - 34 pg    MCHC 34.6 31 - 37 g/dL    RDW 14.0 12.1 - 15.2 %    Platelets 735 049 - 965 k/uL    MPV NOT REPORTED 6.0 - 12.0 fL    NRBC Automated NOT REPORTED per 100 WBC    Differential Type YES     Seg Neutrophils 49 47 - 75 %    Lymphocytes 33 15 - 40 %    Monocytes 12 (H) 4 - 8 %    Eosinophils % 6 (H) 0 - 5 %    Basophils 0 0 - 2 %    Immature Granulocytes NOT REPORTED 0 %    Segs Absolute 3.30 2.5 - 7.0 k/uL    Absolute Lymph # 2.20 1.0 - 4.8 k/uL    Absolute Mono # 0.80 0.0 - 1.0 k/uL    Absolute Eos # 0.40 0.0 - 0.4 k/uL    Basophils Absolute 0.00 0.0 - 0.2 k/uL    Absolute Immature Granulocyte NOT REPORTED 0.00 - 0.30 k/uL    WBC Morphology NOT REPORTED     RBC Morphology NOT REPORTED     Platelet Estimate NOT REPORTED    Comprehensive Metabolic Panel    Collection Time: 04/19/21  2:00 PM   Result Value Ref Range    Glucose 97 70 - 99 mg/dL    BUN 16 8 - 23 mg/dL    CREATININE 0.69 0.50 - 0.90 mg/dL    Bun/Cre Ratio NOT REPORTED 9 - 20    Calcium 9.4 8.6 - 10.4 mg/dL    Sodium 127 (L) 135 - 144 mmol/L    Potassium 4.5 3.7 - 5.3 mmol/L    Chloride 89 (L) 98 - 107 mmol/L    CO2 29 20 - 31 mmol/L    Anion Gap 9 9 - 17 mmol/L    Alkaline Phosphatase 102 35 - 104 U/L    ALT 23 5 - 33 U/L    AST 24 <32 U/L    Total Bilirubin 0.24 (L) 0.30 - 1.20 mg/dL    Total Protein 7.6 6.4 - 8.3 g/dL    Albumin 4.4 3.5 - 5.2 g/dL    Albumin/Globulin Ratio NOT REPORTED 1.0 - 2.5    GFR Non-African American >60 >60 mL/min    GFR African American >60 >60 mL/min    GFR Comment          GFR Staging NOT REPORTED    Troponin    Collection Time: 04/19/21  2:00 PM   Result Value Ref Range    Troponin, High Sensitivity <6 0 - 14 ng/L    Troponin T NOT REPORTED <0.03 ng/mL    Troponin Interp NOT REPORTED    Urinalysis    Collection Time: 04/19/21  3:33 PM   Result Value Ref Range    Color, UA YELLOW YELLOW    Turbidity UA CLEAR CLEAR    Glucose, Ur NEGATIVE NEGATIVE    Bilirubin Urine NEGATIVE NEGATIVE    Ketones, Urine NEGATIVE NEGATIVE    Specific Gravity, UA 1.010 1.005 - 1.030    Urine Hgb TRACE (A) NEGATIVE    pH, UA 7.0 5.0 - 8.0    Protein, UA NEGATIVE NEGATIVE    Urobilinogen, Urine Normal Normal    Nitrite, Urine POSITIVE (A) NEGATIVE    Leukocyte Esterase, Urine NEGATIVE NEGATIVE    Urinalysis Comments         Microscopic Urinalysis    Collection Time: 04/19/21  3:33 PM   Result Value Ref Range    -          WBC, UA 0 TO 2 0 /HPF    RBC, UA 0 TO 2 0 - 2 /HPF    Casts UA NOT REPORTED /LPF    Crystals, UA NOT REPORTED None /HPF    Epithelial Cells UA 2 TO 5 /HPF    Renal Epithelial, UA NOT REPORTED 0 /HPF    Bacteria, UA 3+ (A) None    Mucus, UA NOT REPORTED None    Trichomonas, UA NOT REPORTED None    Amorphous, UA NOT REPORTED None    Other Observations UA NOT REPORTED NOT REQ. Yeast, UA NOT REPORTED None   COVID-19, Rapid    Collection Time: 04/19/21  3:34 PM    Specimen: Nasopharyngeal Swab   Result Value Ref Range    Specimen Description . NASOPHARYNGEAL SWAB     SARS-CoV-2, Rapid Not Detected Not Detected   TSH with Reflex    Collection Time: 04/19/21  7:00 PM   Result Value Ref Range    TSH 2.05 0.30 - 5.00 mIU/L   Basic Metabolic Panel    Collection Time: 04/20/21  5:00 AM   Result Value Ref Range    Glucose 116 (H) 70 - 99 mg/dL    BUN 15 8 - 23 mg/dL    CREATININE 0.53 0.50 - 0.90 mg/dL    Bun/Cre Ratio 28 (H) 9 - 20    Calcium 9.4 8.6 - 10.4 mg/dL    Sodium 125 (L) 135 - 144 mmol/L    Potassium 4.2 3.7 - 5.3 mmol/L    Chloride 90 (L) 98 - 107 mmol/L    CO2 24 20 - 31 mmol/L    Anion Gap 11 9 - 17 mmol/L    GFR Non-African American >60 >60 mL/min    GFR African American >60 >60 mL/min    GFR Comment          GFR Staging NOT REPORTED        Radiology:     Ct Head Wo Contrast    Result Date: 4/19/2021  EXAMINATION: CT HEAD WO CONTRAST HISTORY: Slurred speech left arm numbness. COMPARISON: None. TECHNIQUE: CT examination of the head without IV contrast. Dose reduction techniques were achieved by using automated exposure control and/or adjustment of mA and/or kV according to patient size and/or use of iterative reconstruction technique. FINDINGS: Normal ventricles and sulci with no bleed, mass, midline shift or extra-axial fluid collection. Normal variant cavum septi pellucidum and cavum vergae. Naya bullosa left middle turbinate. Minimal mucosal thickening left ethmoid air cells. Multiplanar reconstructions show no additional abnormality. 1. No bleed mass or midline shift. 2. Minimal mucosal thickening left ethmoid air cells. EKG: Normal sinus rhythm, rate 77, no acute changes    Assessment/ Plan:    1.   TIA versus stroke - the patient had no acute intracranial abnormalities on CT head, this morning continues to have heaviness and numbness in the left upper extremity and intermittent slurry speech, noted to have left facial droop. Will order MRI of the brain to rule out stroke, check 2D echo, carotid ultrasound. Started on aspirin . 2. Hypertension -blood pressure uncontrolled, patient is started on amlodipine 5 mg daily and losartan 25 mg daily. Will discontinue HCTZ due to hyponatremia. 3.  Hyponatremia -most likely due to HCTZ which is discontinued. Will start on IV normal saline, monitor sodium level. TSH was normal.  Will check serum and urine osmolarity and urine sodium levels. 4.  Probable UTI - started on IV Rocephin  5. Tobacco dependence - ordered nicotine patch  6. Chronic left shoulder pain - as needed Tylenol and/or ibuprofen. Recent MRI showing partial rotator cuff tear, outpatient follow-up with Dr. Margie Roberts full    Medical Necessity: Inpatient admission is appropriate for this patient secondary to the need of work-up for stroke and hyponatremia    Estimated length of stay:2 days. The beneficiary may reasonably be expected to be discharged or transferred to a hospital within 96 hours after admission.     DVT prophylaxis:   [x] Lovenox   [] SCDs   [] SQ Heparin   [] Encourage ambulation, low risk for DVT, no chemical or mechanical    prophylaxis necessary      [] Already on Anticoagulation    Anticipated Disposition upon discharge:   [x] Home   [] Home with Home Health   [] [facility]   [] [address] [place] [address],[address]      Electronically signed by Inocencio York MD on 4/20/2021 at 7:45 AM

## 2021-04-21 ENCOUNTER — TELEPHONE (OUTPATIENT)
Dept: FAMILY MEDICINE CLINIC | Age: 61
End: 2021-04-21

## 2021-04-21 ENCOUNTER — APPOINTMENT (OUTPATIENT)
Dept: GENERAL RADIOLOGY | Age: 61
DRG: 065 | End: 2021-04-21
Payer: COMMERCIAL

## 2021-04-21 ENCOUNTER — APPOINTMENT (OUTPATIENT)
Dept: CT IMAGING | Age: 61
DRG: 065 | End: 2021-04-21
Payer: COMMERCIAL

## 2021-04-21 VITALS
WEIGHT: 105.5 LBS | OXYGEN SATURATION: 98 % | HEART RATE: 92 BPM | TEMPERATURE: 98.1 F | BODY MASS INDEX: 16.95 KG/M2 | SYSTOLIC BLOOD PRESSURE: 148 MMHG | RESPIRATION RATE: 18 BRPM | HEIGHT: 66 IN | DIASTOLIC BLOOD PRESSURE: 85 MMHG

## 2021-04-21 PROBLEM — E44.0 MODERATE MALNUTRITION (HCC): Status: ACTIVE | Noted: 2021-04-21

## 2021-04-21 LAB
ANION GAP SERPL CALCULATED.3IONS-SCNC: 8 MMOL/L (ref 9–17)
BUN BLDV-MCNC: 10 MG/DL (ref 8–23)
BUN/CREAT BLD: 18 (ref 9–20)
CALCIUM SERPL-MCNC: 8.9 MG/DL (ref 8.6–10.4)
CHLORIDE BLD-SCNC: 101 MMOL/L (ref 98–107)
CHOLESTEROL/HDL RATIO: 4.3
CHOLESTEROL: 192 MG/DL
CO2: 26 MMOL/L (ref 20–31)
CREAT SERPL-MCNC: 0.55 MG/DL (ref 0.5–0.9)
CULTURE: ABNORMAL
ESTIMATED AVERAGE GLUCOSE: 114 MG/DL
GFR AFRICAN AMERICAN: >60 ML/MIN
GFR NON-AFRICAN AMERICAN: >60 ML/MIN
GFR SERPL CREATININE-BSD FRML MDRD: ABNORMAL ML/MIN/{1.73_M2}
GFR SERPL CREATININE-BSD FRML MDRD: ABNORMAL ML/MIN/{1.73_M2}
GLUCOSE BLD-MCNC: 101 MG/DL (ref 70–99)
HBA1C MFR BLD: 5.6 % (ref 4–6)
HDLC SERPL-MCNC: 45 MG/DL
LDL CHOLESTEROL: 113 MG/DL (ref 0–130)
Lab: ABNORMAL
POTASSIUM SERPL-SCNC: 4 MMOL/L (ref 3.7–5.3)
SODIUM BLD-SCNC: 135 MMOL/L (ref 135–144)
SPECIMEN DESCRIPTION: ABNORMAL
TRIGL SERPL-MCNC: 172 MG/DL
VLDLC SERPL CALC-MCNC: ABNORMAL MG/DL (ref 1–30)

## 2021-04-21 PROCEDURE — 71046 X-RAY EXAM CHEST 2 VIEWS: CPT

## 2021-04-21 PROCEDURE — 70498 CT ANGIOGRAPHY NECK: CPT

## 2021-04-21 PROCEDURE — 2580000003 HC RX 258: Performed by: INTERNAL MEDICINE

## 2021-04-21 PROCEDURE — 97161 PT EVAL LOW COMPLEX 20 MIN: CPT

## 2021-04-21 PROCEDURE — 6360000002 HC RX W HCPCS: Performed by: INTERNAL MEDICINE

## 2021-04-21 PROCEDURE — 36415 COLL VENOUS BLD VENIPUNCTURE: CPT

## 2021-04-21 PROCEDURE — 6360000004 HC RX CONTRAST MEDICATION: Performed by: INTERNAL MEDICINE

## 2021-04-21 PROCEDURE — 6370000000 HC RX 637 (ALT 250 FOR IP): Performed by: INTERNAL MEDICINE

## 2021-04-21 PROCEDURE — 80048 BASIC METABOLIC PNL TOTAL CA: CPT

## 2021-04-21 RX ORDER — NICOTINE 21 MG/24HR
1 PATCH, TRANSDERMAL 24 HOURS TRANSDERMAL DAILY
Qty: 30 PATCH | Refills: 3 | Status: SHIPPED | OUTPATIENT
Start: 2021-04-22 | End: 2022-02-24

## 2021-04-21 RX ORDER — ROSUVASTATIN CALCIUM 20 MG/1
40 TABLET, COATED ORAL ONCE
Status: COMPLETED | OUTPATIENT
Start: 2021-04-21 | End: 2021-04-21

## 2021-04-21 RX ORDER — CLOPIDOGREL BISULFATE 75 MG/1
75 TABLET ORAL DAILY
Qty: 21 TABLET | Refills: 0 | Status: SHIPPED | OUTPATIENT
Start: 2021-04-21 | End: 2021-05-27 | Stop reason: ALTCHOICE

## 2021-04-21 RX ORDER — ASPIRIN 81 MG/1
81 TABLET ORAL DAILY
Qty: 90 TABLET | Refills: 1 | Status: SHIPPED | OUTPATIENT
Start: 2021-04-21 | End: 2021-07-12 | Stop reason: SDUPTHER

## 2021-04-21 RX ORDER — ROSUVASTATIN CALCIUM 40 MG/1
40 TABLET, COATED ORAL DAILY
Qty: 30 TABLET | Refills: 2 | Status: SHIPPED | OUTPATIENT
Start: 2021-04-21 | End: 2021-07-12 | Stop reason: SDUPTHER

## 2021-04-21 RX ORDER — LOSARTAN POTASSIUM 50 MG/1
50 TABLET ORAL DAILY
Qty: 30 TABLET | Refills: 2 | Status: SHIPPED | OUTPATIENT
Start: 2021-04-21 | End: 2021-07-12 | Stop reason: SDUPTHER

## 2021-04-21 RX ORDER — ASPIRIN 81 MG/1
324 TABLET, CHEWABLE ORAL ONCE
Status: COMPLETED | OUTPATIENT
Start: 2021-04-21 | End: 2021-04-21

## 2021-04-21 RX ORDER — AMLODIPINE BESYLATE 5 MG/1
5 TABLET ORAL DAILY
Qty: 30 TABLET | Refills: 3 | Status: SHIPPED | OUTPATIENT
Start: 2021-04-22 | End: 2021-04-22 | Stop reason: SDUPTHER

## 2021-04-21 RX ORDER — CLOPIDOGREL BISULFATE 75 MG/1
300 TABLET ORAL ONCE
Status: COMPLETED | OUTPATIENT
Start: 2021-04-21 | End: 2021-04-21

## 2021-04-21 RX ORDER — CEPHALEXIN 500 MG/1
500 CAPSULE ORAL 3 TIMES DAILY
Qty: 9 CAPSULE | Refills: 0 | Status: SHIPPED | OUTPATIENT
Start: 2021-04-21 | End: 2021-04-24

## 2021-04-21 RX ORDER — LOSARTAN POTASSIUM 50 MG/1
25 TABLET ORAL ONCE
Status: COMPLETED | OUTPATIENT
Start: 2021-04-21 | End: 2021-04-21

## 2021-04-21 RX ORDER — LOSARTAN POTASSIUM 50 MG/1
50 TABLET ORAL DAILY
Status: DISCONTINUED | OUTPATIENT
Start: 2021-04-22 | End: 2021-04-21 | Stop reason: HOSPADM

## 2021-04-21 RX ADMIN — AMLODIPINE BESYLATE 5 MG: 5 TABLET ORAL at 09:20

## 2021-04-21 RX ADMIN — ENOXAPARIN SODIUM 40 MG: 40 INJECTION SUBCUTANEOUS at 09:19

## 2021-04-21 RX ADMIN — SODIUM CHLORIDE TAB 1 GM 1 G: 1 TAB at 09:28

## 2021-04-21 RX ADMIN — OXYCODONE HYDROCHLORIDE AND ACETAMINOPHEN 250 MG: 500 TABLET ORAL at 09:19

## 2021-04-21 RX ADMIN — IOPAMIDOL 100 ML: 755 INJECTION, SOLUTION INTRAVENOUS at 11:57

## 2021-04-21 RX ADMIN — CHOLECALCIFEROL TAB 25 MCG (1000 UNIT) 2000 UNITS: 25 TAB at 09:19

## 2021-04-21 RX ADMIN — LOSARTAN POTASSIUM 25 MG: 50 TABLET ORAL at 14:44

## 2021-04-21 RX ADMIN — FAMOTIDINE 20 MG: 20 TABLET ORAL at 09:19

## 2021-04-21 RX ADMIN — ROSUVASTATIN CALCIUM 40 MG: 20 TABLET, COATED ORAL at 11:36

## 2021-04-21 RX ADMIN — SODIUM CHLORIDE, PRESERVATIVE FREE 10 ML: 5 INJECTION INTRAVENOUS at 09:20

## 2021-04-21 RX ADMIN — ACETAMINOPHEN 650 MG: 325 TABLET, FILM COATED ORAL at 09:33

## 2021-04-21 RX ADMIN — THERA TABS 1 TABLET: TAB at 09:19

## 2021-04-21 RX ADMIN — SODIUM CHLORIDE: 9 INJECTION, SOLUTION INTRAVENOUS at 06:17

## 2021-04-21 RX ADMIN — LOSARTAN POTASSIUM 25 MG: 50 TABLET ORAL at 09:19

## 2021-04-21 RX ADMIN — ASPIRIN 324 MG: 81 TABLET, CHEWABLE ORAL at 11:36

## 2021-04-21 RX ADMIN — ASPIRIN 81 MG: 81 TABLET, CHEWABLE ORAL at 09:19

## 2021-04-21 RX ADMIN — CLOPIDOGREL BISULFATE 300 MG: 75 TABLET ORAL at 11:36

## 2021-04-21 NOTE — PROGRESS NOTES
Acknowledge pt discharge to home later today with family. Financial counseling met with pt yesterday regarding her concerns with the bill. Pt's medications appear to be ones that should be covered for her. Pt knows that she may call in for assistance if they end up being more than planned. No discharge needs or concerns identified by pt.  Brigitte Shea MSW LSW 4/21/2021

## 2021-04-21 NOTE — PROGRESS NOTES
Hospitalist Progress Note  4/21/2021 11:39 AM  Subjective:   Admit Date: 4/19/2021  PCP: Aj Avelar DO    Interval History:   The patient continues to have some weakness and heaviness in the left upper extremity. Speech has improved. She has no other complaints except pain in her left shoulder. States his partial rotator cuff tear and supposed to see orthopedic surgeon Dr. Darinel Murdock. She reports no headaches, no dizziness today, no chest pain, no shortness of breath    Diet: DIET CARDIAC; Dietary Nutrition Supplements: Standard High Calorie Oral Supplement  Medications:   Scheduled Meds:   amLODIPine  5 mg Oral Daily    sodium chloride  1 g Oral BID WC    vitamin C  250 mg Oral Daily    Vitamin D  2,000 Units Oral Daily    famotidine  20 mg Oral BID    multivitamin  1 tablet Oral Daily    sodium chloride flush  5-40 mL Intravenous 2 times per day    enoxaparin  40 mg Subcutaneous Daily    losartan  25 mg Oral Daily    aspirin  81 mg Oral Daily    nicotine  1 patch Transdermal Daily    cefTRIAXone (ROCEPHIN) IV  1,000 mg Intravenous Q24H     Continuous Infusions:   sodium chloride 100 mL/hr at 04/21/21 0617    sodium chloride       PRN Medications: iopamidol, albuterol sulfate HFA, ibuprofen, sodium chloride flush, sodium chloride, promethazine **OR** ondansetron, polyethylene glycol, acetaminophen **OR** acetaminophen, labetalol    Objective:   Vitals: BP (!) 158/80   Pulse 85   Temp 97.8 °F (36.6 °C) (Oral)   Resp 18   Ht 5' 6\" (1.676 m)   Wt 105 lb 8 oz (47.9 kg)   LMP  (LMP Unknown)   SpO2 98%   BMI 17.03 kg/m²   BMI: Body mass index is 17.03 kg/m².     CBC:   Recent Labs     04/19/21  1400   WBC 6.7   HGB 14.7        BMP:    Recent Labs     04/20/21  0500 04/20/21  1155 04/21/21  0510   * 126* 135   K 4.2 4.3 4.0   CL 90* 90* 101   CO2 24 27 26   BUN 15 13 10   CREATININE 0.53 0.64 0.55   GLUCOSE 116* 114* 101*     Hepatic:   Recent Labs     04/19/21  1400   AST 24 ALT 23   BILITOT 0.24*   ALKPHOS 102     Troponin: No results for input(s): TROPONINI in the last 72 hours. BNP: No results for input(s): BNP in the last 72 hours. Lipids:   Recent Labs     04/20/21  0500   CHOL 192   HDL 45     INR: No results for input(s): INR in the last 72 hours. Physical Exam:    General Appearance: alert and oriented to person, place and time, in no acute distress  Cardiovascular: normal rate, regular rhythm, normal S1 and S2, no murmur  Pulmonary/Chest: clear to auscultation bilaterally- no wheezes, rales  Abdomen: soft, non-tender, non-distended, normal bowel sounds  Extremities: no cyanosis, clubbing or edema,   Skin: warm and dry, no rash or erythema  Neurological: alert, oriented,  Speech is normal, no slurring, left facial droop. Muscle strength in upper and lower extremities 5 out of 5, sensation to touch in extremities and face is intact        Assessment and Plan:     1. Acute/subacute infarct at the posterior basal ganglia lentiform nucleus region on the right side per MRI brain. Case discussed with neurologist Bee Friend on the phone. Recommends loading the patient with aspirin, Plavix 300 mg x 1, start on Crestor 40 mg daily. On discharge the patient is to continue on Plavix 75 mg daily for 3 weeks, continue on aspirin 81 mg daily and Crestor 40 mg daily. Per his recommendations ordered CTA head and neck. Echo pending. Bilateral carotid ultrasound revealed normal velocities R CCA and ICA, 50 to 69% stenosis with visible plaque left proximal ICA. We will consult PT and OT for evaluation of weakness in the left arm  2. Hyponatremia -significantly improved. Will stop IV fluids. HCTZ discontinued. Will order chest x-ray to rule out potential mass since she is a smoker. Patient had mildly low serum osmolality, normal urine osmolality. Order follow-up BMP outpatient. She will need to follow-up with her PCP after discharge  3.   Hypertension -blood pressure is improving, still elevated. Will increase losartan to 50 mg daily continue on amlodipine 5 mg daily  4. UTI secondary to E. Coli -sensitivity report is pending, on IV Rocephin  5. Chronic left shoulder pain -prn Tylenol. Recent MRI showing partial rotator cuff tear, patient to follow-up outpatient with Dr. Shirley Hearn.   6.  Tobacco dependence -on nicotine patch    Possible discharge home later today        Patient continues to require inpatient admission related to need for further work-up for stroke      Electronically signed by Сергей Giraldo MD on 4/21/2021 at 11:39 AM    Rounding Hospitalist

## 2021-04-21 NOTE — PLAN OF CARE
Pt puts call light on prior to getting up. Her gait is steady. Denies dizziness or lightheadedness. Denies pain at this juncture.

## 2021-04-21 NOTE — PROGRESS NOTES
[facility]  [facility]  Date: 2021  Patient Name: Fabiano Gonzalez        MRN: [id number]    : 1960  (62 y.o.)  Gender: female   Referring Practitioner: Dr. Rose Cheema  Diagnosis: TIA w/ L sided UE weakness; also note L UE partial rotator cuff tear  Additional Pertinent Hx: 62 y.o. female presented to the emergency room for evaluation of sudden onset slurred speech, left upper extremity numbness and heaviness, dizziness. Pt currenlty on medical leave for shoulder injury, MRI of the shoulder 3/31/21 revealed partial rotator cuff tear. Was referred to orthopedic surgeon Dr. Paola Schlatter but has not seen him yet. CT scan of head was negative for acute intracranial abnormalities.  Pt admitted for TIA for L UE weakness/numbess & referred to OT to assess current ability to care for self and need for potential outpatient OT for UE weakness  Past Medical History:   Diagnosis Date    Allergic rhinitis     dust mites and nickel    Essential hypertension 2021     Past Surgical History:   Procedure Laterality Date   [address]    2nd in                 Subjective  Subjective: Pt in bed upon arrival  Pain Level: 1  Pain Location: Head        Social/Functional History  Lives With: Significant other, Son(boyfriend)  Type of Home: House  Home Layout: Laundry in basement, Two level(10 steps w/ B HR to basement & 10 steps w/ B HR upstairs for storage)  Home Access: (stoop/1 step)  Bathroom Shower/Tub: Tub only  Bathroom Toilet: Standard  Bathroom Accessibility: Accessible  Home Equipment: [employer]  ADL Assistance: Independent  Homemaking Assistance: Independent  Homemaking Responsibilities: Yes  Ambulation Assistance: Independent(no device)  Transfer Assistance: Independent(no device)  Active : Yes  Occupation: Full time employment  Type of occupation: currently on medical leave at [employer] for a shoulder injury- MRI of the shoulder 3/31/21 revealed partial rotator cuff tear.  Was referred to orthopedic surgeon Dr. Dixon Severin but has not seen him yet  Prior Function  ADL Assistance: Independent  Homemaking Assistance: Independent  Ambulation Assistance: Independent(no device)  Transfer Assistance: Independent(no device)    Objective   ADL  Feeding: Setup, Independent  Grooming: Setup, Modified independent   UE Bathing: Setup, Modified independent   LE Bathing: Setup, Modified independent   UE Dressing: Setup, Modified independent   LE Dressing: Setup, Modified independent   Toileting: Setup, Modified independent    Supine to Sit: Independent  Sit to Supine: Independent       Balance  Sitting Balance: Independent  Standing Balance: Stand by assistance       Functional Mobility  Functional - Mobility Device: No device  Activity: Other  Assist Level: Stand by assistance         Assessment: OT eval completed. Pt in bed upon arrival. Pt demonstrates functional mobility as documented above. No LOB or dizziness noted during ambulation w/o AD. Pt demo's no UB ROM deficits despite partial rotator cuff tear on L UE. Noted L UE weakness as compared to R UE MMT especially in L hand. Note slight delay in motor coordination  & dexterity regarding L hand. Pt noted to drop several items during evaluation such as pack of crackers & chapstick. Pt also struggles to open small packets such as crystal light & pt states she struggled to open packets on lunch tray. Pt would benefit from outpatient OT intervention for generalized weakness in L UE secondary to TIA. No acute OT needs identified at this time.       Goals  Short term goals  Time Frame for Short term goals: N/A  Long term goals  Time Frame for Long term goals : N/A    Plan  No Skilled OT: Safe to return home, No OT goals identified  REQUIRES OT FOLLOW UP: No    Times per week: N/A         Time In: 8404  Time Out: 1313  Timed Coded Minutes: 0  Total Treatment Time: 27    KENDRA Swann, OTR/L  4/21/2021

## 2021-04-21 NOTE — PLAN OF CARE
Problem: SAFETY  Goal: Free from accidental physical injury  Outcome: Completed     Problem: DAILY CARE  Goal: Daily care needs are met  Outcome: Completed     Problem: PAIN  Goal: Patient's pain/discomfort is manageable  Outcome: Completed     Problem: KNOWLEDGE DEFICIT  Goal: Patient/S.O. demonstrates understanding of disease process, treatment plan, medications, and discharge instructions.   Outcome: Completed     Problem: DISCHARGE BARRIERS  Goal: Patient's continuum of care needs are met  Outcome: Completed     Problem: Pain:  Goal: Pain level will decrease  Description: Pain level will decrease  Outcome: Completed  Goal: Control of acute pain  Description: Control of acute pain  Outcome: Completed  Goal: Control of chronic pain  Description: Control of chronic pain  Outcome: Completed     Problem: HEMODYNAMIC STATUS  Goal: Patient has stable vital signs and fluid balance  Outcome: Completed     Problem: ACTIVITY INTOLERANCE/IMPAIRED MOBILITY  Goal: Mobility/activity is maintained at optimum level for patient  Outcome: Completed     Problem: COMMUNICATION IMPAIRMENT  Goal: Ability to express needs and understand communication  Outcome: Completed     Problem: Fluid Volume:  Goal: Ability to achieve a balanced intake and output will improve  Description: Ability to achieve a balanced intake and output will improve  Outcome: Completed     Problem: Physical Regulation:  Goal: Ability to maintain clinical measurements within normal limits will improve  Description: Ability to maintain clinical measurements within normal limits will improve  Outcome: Completed  Goal: Will show no signs and symptoms of electrolyte imbalance  Description: Will show no signs and symptoms of electrolyte imbalance  Outcome: Completed     Problem: Nutrition  Goal: Optimal nutrition therapy  Outcome: Completed

## 2021-04-21 NOTE — PROGRESS NOTES
Physical Therapy        Patient admitted with left extremity weakness associated with TIA. Referred to PT to assess LE strength and mobility. Patient exhibits good left hip, knee and ankle DF strength graded 5/5. Transfers and ambulates without device and no LOB. Good static standing balance with UE flexion and head turning as well as with eyes closed. Able to complete standing heel raises without difficulty. No PT needs identified in regards to ambulation , balance, or LLE strengthening. Patient with recent left rotator cuff injury (has been off work for past 5 weeks) and MRI suggestive of partial tear. Patient would benefit from PT to address rotator cuff dysfunction as well as OT to address left hand weakness and dexterity. Patient to be discharged from hospital to home this afternoon.     Mary Jane Coleman PT  Time in: 3213  Time out: 1600

## 2021-04-21 NOTE — PROGRESS NOTES
Comprehensive Nutrition Assessment    Type and Reason for Visit:  Initial    Nutrition Recommendations/Plan:   1. Continue current diet. 2. Start 4 oz ensure enlive TID with meals. Nutrition Assessment:  Moderate malnutrition r/t insufficient energy intakes aeb BMI 17.03, mild fat/muscle losses, c/o having to \"force herself to eat, not hungry. \" A1c 5.3-older data, current A1c and lipid are in process. Recommend d/c CC diet due to underweight status and not noted Hx DM. Spoke with Dr Saulo Roland and agrees with d/c CC and start heart healthy diet. Added chocolate ensure enlive TID with meals. Spoke with Pt about high calorie/high protein nutrition therapy. Discussed use of good fats such as olive oil, peanut butter, etc. Encouraged her to eat 6 small meals per day and have carb/protein together. Discussed addition of carnation breakfast essentials in whole milk, c/o ensure costing too much. Pt states she needs to \"gain 20#. \"    Malnutrition Assessment:  Malnutrition Status: Moderate malnutrition    Context:  Chronic Illness     Findings of the 6 clinical characteristics of malnutrition:  Energy Intake:  Mild decrease in energy intake (Comment)  Weight Loss:  No significant weight loss     Body Fat Loss:  1 - Mild body fat loss Orbital, Triceps   Muscle Mass Loss:  1 - Mild muscle mass loss Temples (temporalis)  Fluid Accumulation:  No significant fluid accumulation     Strength:  Not Performed    Estimated Daily Nutrient Needs:  Energy (kcal):  4480-1747(38-96/NM); Weight Used for Energy Requirements:  Current     Protein (g):  72-86g(1.5-1.58g/kg); Weight Used for Protein Requirements:  Current        Fluid (ml/day):  1584 ml; Method Used for Fluid Requirements:  1 ml/kcal      Nutrition Related Findings:  appears underweight      Wounds:  None       Current Nutrition Therapies:    DIET CARDIAC;   Dietary Nutrition Supplements: Standard High Calorie Oral Supplement    Anthropometric Measures:  · Height:

## 2021-04-21 NOTE — PROGRESS NOTES
Discharge instructions provided to patient and family at bedside. Verbalized understanding of follow up appointment, diet, activity, medications and reasons to return to ED/call physician. All questions answered. Copy of discharge instructions provided. Assisted to wheelchair and taken to personal car. Denies further needs.

## 2021-04-22 ENCOUNTER — TELEPHONE (OUTPATIENT)
Dept: PRIMARY CARE CLINIC | Age: 61
End: 2021-04-22

## 2021-04-22 RX ORDER — AMLODIPINE BESYLATE 10 MG/1
10 TABLET ORAL DAILY
Qty: 30 TABLET | Refills: 0 | Status: SHIPPED | OUTPATIENT
Start: 2021-04-22 | End: 2021-04-28 | Stop reason: SDUPTHER

## 2021-04-22 NOTE — TELEPHONE ENCOUNTER
[facility] Transitions Initial Follow Up Call    Outreach made within 2 business days of discharge: Yes    Patient: Alivia Kuo Patient : 1960   MRN: [id number]  Reason for Admission: TIA  Discharge Date: 21       Spoke with: Patient    Discharge department/facility: [facility]    TCM Interactive Patient Contact:  Was patient able to fill all prescriptions: Yes  Was patient instructed to bring all medications to the follow-up visit: Yes  Is patient taking all medications as directed in the discharge summary? Yes  Does patient understand their discharge instructions: Yes  Does patient have questions or concerns that need addressed prior to 7-14 day follow up office visit: yes - BP running higher,  169/98.     Scheduled appointment with PCP within 7-14 days    Follow Up  Future Appointments   Date Time Provider Zainab Kim   2021  9:20 AM Oh Cueto DO [place] [address]   2021  3:00 PM Ruthann Christianson MD Neuro Endo [address] [place] [address]

## 2021-04-22 NOTE — TELEPHONE ENCOUNTER
[facility] Transitions Initial Follow Up Call    Outreach made within 2 business days of discharge: Yes    Patient: London Joseph Patient : 1960   MRN: [id number]  Reason for Admission: TIA  Discharge Date: 21       Spoke with: NA- no answer, no vm    Discharge department/facility: [facility]      Scheduled appointment with PCP within 7-14 days    Follow Up  Future Appointments   Date Time Provider Zainab Kim   2021  9:20 AM Charito Donovan DO [address]   2021  3:00 PM Jaida Coffman MD Neuro Endo [address] [place] [address]

## 2021-04-26 NOTE — TELEPHONE ENCOUNTER
Would not be typical.  Please have her keep checking her blood pressure. Most people will feel okay as long as systolic is above about 666. Has a follow-up appointment this week.

## 2021-04-26 NOTE — TELEPHONE ENCOUNTER
Pt phoned into office to state that her BP is  104/62. Pt states that her stomach has been aching, no energy, hands are cold , denies dizziness. Headaches.  Would like to know if this could mean her BP is to low caused from increasing her amlodipine 10 mg

## 2021-04-28 ENCOUNTER — HOSPITAL ENCOUNTER (OUTPATIENT)
Age: 61
Discharge: HOME | End: 2021-04-28
Payer: COMMERCIAL

## 2021-04-28 ENCOUNTER — OFFICE VISIT (OUTPATIENT)
Dept: FAMILY MEDICINE CLINIC | Age: 61
End: 2021-04-28
Payer: COMMERCIAL

## 2021-04-28 VITALS
WEIGHT: 104 LBS | HEART RATE: 92 BPM | HEIGHT: 66 IN | SYSTOLIC BLOOD PRESSURE: 132 MMHG | OXYGEN SATURATION: 99 % | DIASTOLIC BLOOD PRESSURE: 66 MMHG | BODY MASS INDEX: 16.71 KG/M2

## 2021-04-28 DIAGNOSIS — E87.1 HYPONATREMIA: ICD-10-CM

## 2021-04-28 DIAGNOSIS — I67.1 CEREBRAL ANEURYSM: ICD-10-CM

## 2021-04-28 DIAGNOSIS — I10 ESSENTIAL HYPERTENSION: ICD-10-CM

## 2021-04-28 DIAGNOSIS — I63.9 INFARCTION OF RIGHT BASAL GANGLIA (HCC): Primary | ICD-10-CM

## 2021-04-28 DIAGNOSIS — Z09 HOSPITAL DISCHARGE FOLLOW-UP: ICD-10-CM

## 2021-04-28 LAB
ANION GAP SERPL CALCULATED.3IONS-SCNC: 8 MMOL/L (ref 9–17)
BUN BLDV-MCNC: 16 MG/DL (ref 8–23)
BUN/CREAT BLD: 22 (ref 9–20)
CALCIUM SERPL-MCNC: 9.7 MG/DL (ref 8.6–10.4)
CHLORIDE BLD-SCNC: 95 MMOL/L (ref 98–107)
CO2: 29 MMOL/L (ref 20–31)
CREAT SERPL-MCNC: 0.74 MG/DL (ref 0.5–0.9)
GFR AFRICAN AMERICAN: >60 ML/MIN
GFR NON-AFRICAN AMERICAN: >60 ML/MIN
GFR SERPL CREATININE-BSD FRML MDRD: ABNORMAL ML/MIN/{1.73_M2}
GFR SERPL CREATININE-BSD FRML MDRD: ABNORMAL ML/MIN/{1.73_M2}
GLUCOSE BLD-MCNC: 68 MG/DL (ref 70–99)
POTASSIUM SERPL-SCNC: 4.4 MMOL/L (ref 3.7–5.3)
SODIUM BLD-SCNC: 132 MMOL/L (ref 135–144)

## 2021-04-28 PROCEDURE — 36415 COLL VENOUS BLD VENIPUNCTURE: CPT

## 2021-04-28 PROCEDURE — 80048 BASIC METABOLIC PNL TOTAL CA: CPT

## 2021-04-28 PROCEDURE — 99496 TRANSJ CARE MGMT HIGH F2F 7D: CPT | Performed by: FAMILY MEDICINE

## 2021-04-28 RX ORDER — AMLODIPINE BESYLATE 10 MG/1
10 TABLET ORAL DAILY
Qty: 90 TABLET | Refills: 1 | Status: SHIPPED | OUTPATIENT
Start: 2021-04-28 | End: 2021-05-21

## 2021-04-28 NOTE — PATIENT INSTRUCTIONS
SURVEY:    You may be receiving a survey from [employer] regarding your visit today. You may get this in the mail, through your MyChart or in your email. Please complete the survey to enable us to provide the highest quality of care to you and your family. If you cannot score us as very good ( 5 Stars) on any question, please feel free to call the office to discuss how we could have made your experience exceptional.     Thank you.     Clinical Care Team:  Dr. Charito Donovan, DO Enid Adams, [address] [place] [address] Team:  Eyal 11

## 2021-04-28 NOTE — PROGRESS NOTES
Name: Marianela Knapp  : 1960         Chief Complaint:     Chief Complaint   Patient presents with    Follow-Up from Hospital     TIA    Hypertension       History of Present Illness:      Marianela Knapp is a 62 y.o.  female who presents with Follow-Up from Hospital (TIA) and Hypertension      HPI     Patient was admitted to [facility] from  to  for . Initial post-discharge communication occurred between medical assistant and patient on - see documentation in chart: telephone encounter. Diagnostic test results reviewed: inpatient labs, CT-head and MRI-brain    Patient risk of morbidity and mortality: high    Medical Decision Making: high complexity    Day of admission, pt had been working on computer and had sudden heaviness and numbness of LUE. Slurred speech. Stood up and felt \"off. \" Recalled later that for a couple wks she would intermittently lose strength in L hand, drop something. Was admitted and had testing, was started on statin and inc bp meds, plavix and asa. She's overall feeling better, although strength still varies in LUE. Quit smoking and cut back on coffee. Using nicotine patches. BP at home 130-154/70s recently. At time of hospital f/u phone call she said her BP was high so we increased amlodipine dose from 5mg daily to 10mg daily. Couple days later she called c/o feeling cold and having headaches. Notes she seems to have having stomach trouble since starting meds. Appetite very poor at times. Hyponatremia in hospital so she is off HCTZ now. Drinking approx her usual amt of fluids and no sports drinks. Was treated for UTI. . had urine odor in the hospital, not prior. Urine seems less strong now that she's off hctz.      Past Medical History:     Past Medical History:   Diagnosis Date    Allergic rhinitis     dust mites and nickel    Essential hypertension 2021        Past Surgical History:     Past Surgical History:   Procedure Laterality Date   [address]    2nd in 1994        Medications:       Prior to Admission medications    Medication Sig Start Date End Date Taking? Authorizing Provider   amLODIPine (NORVASC) 10 MG tablet Take 1 tablet by mouth daily 4/28/21  Yes Roly Frey DO   aspirin EC 81 MG EC tablet Take 1 tablet by mouth daily 4/21/21  Yes Shanique Meade MD   rosuvastatin (CRESTOR) 40 MG tablet Take 1 tablet by mouth daily 4/21/21  Yes Shanique Meade MD   losartan (COZAAR) 50 MG tablet Take 1 tablet by mouth daily 4/21/21  Yes Shanique Meade MD   nicotine (NICODERM CQ) 21 MG/24HR Place 1 patch onto the skin daily 4/22/21  Yes Shanique Meade MD   clopidogrel (PLAVIX) 75 MG tablet Take 1 tablet by mouth daily 4/21/21  Yes Shanique Meade MD   MELOXICAM PO Take 15 mLs by mouth Daily in the evening with dinner   Yes Historical Provider, MD   fluocinolone (DERMOTIC) 0.01 % OIL oil Put 4 drops into the affected ear(s) twice daily x 2 weeks. After that can decrease use to 1-2 times a week for maintenance.  1/4/21  Yes AGNES Harrell   albuterol sulfate  (90 Base) MCG/ACT inhaler Inhale 2 puffs into the lungs 4 times daily as needed for Wheezing 11/14/19  Yes ROGELIO Lucero - CNP   Probiotic Product (PROBIOTIC-10) CAPS Take by mouth daily   Yes Historical Provider, MD   famotidine (PEPCID) 20 MG tablet Take 20 mg by mouth 2 times daily   Yes Historical Provider, MD   Multiple Vitamin (MULTI-VITAMIN DAILY PO) Take by mouth   Yes Historical Provider, MD   Ascorbic Acid (VITAMIN C) 250 MG tablet Take 250 mg by mouth daily   Yes Historical Provider, MD   Cholecalciferol (VITAMIN D3) 2000 units CAPS Take by mouth   Yes Historical Provider, MD   acetaminophen (TYLENOL) 325 MG tablet Take 325 mg by mouth every 6 hours as needed for Pain   Yes Historical Provider, MD   diphenhydrAMINE (BENADRYL) 25 MG tablet Take 25 mg by mouth every 6 hours as needed for Itching   Yes Historical Provider, MD        Allergies: Other    Social History:     Tobacco:    reports that she quit smoking 13 days ago. She has a 15.00 pack-year smoking history. She has never used smokeless tobacco.  Alcohol:      reports no history of alcohol use. Drug Use:  reports no history of drug use. Family History:     No family history on file. Review of Systems:     Positive and Negative as described in HPI    Review of Systems   HENT: Negative. Eyes: Negative. Respiratory: Negative. Gastrointestinal: Negative for constipation and diarrhea. Physical Exam:     Vitals:  /66   Pulse 92   Ht 5' 6\" (1.676 m)   Wt 104 lb (47.2 kg)   LMP  (LMP Unknown)   SpO2 99%   BMI 16.79 kg/m²   Physical Exam  Vitals signs and nursing note reviewed. Constitutional:       General: She is not in acute distress. Appearance: Normal appearance. She is well-developed. She is not ill-appearing. Cardiovascular:      Rate and Rhythm: Normal rate and regular rhythm. Heart sounds: Normal heart sounds. Pulmonary:      Effort: Pulmonary effort is normal.      Breath sounds: Normal breath sounds. Neurological:      Mental Status: She is alert and oriented to person, place, and time. Cranial Nerves: No cranial nerve deficit (minimal drooping apparent of L side of mouth but normal and symmetric facial strength). Motor: No weakness (5/5 strength jason upper ext).       Gait: Gait normal.   Psychiatric:         Mood and Affect: Mood normal.         Behavior: Behavior normal.         Data:     Lab Results   Component Value Date     04/28/2021    K 4.4 04/28/2021    CL 95 04/28/2021    CO2 29 04/28/2021    BUN 16 04/28/2021    CREATININE 0.74 04/28/2021    GLUCOSE 68 04/28/2021    PROT 7.6 04/19/2021    LABALBU 4.4 04/19/2021    BILITOT 0.24 04/19/2021    ALKPHOS 102 04/19/2021    AST 24 04/19/2021    ALT 23 04/19/2021     Lab Results   Component Value Date    WBC 6.7 04/19/2021    RBC 4.69 04/19/2021    HGB 14.7 04/19/2021    HCT 42.4 04/19/2021    MCV 90.3 04/19/2021    MCH 31.3 04/19/2021    MCHC 34.6 04/19/2021    RDW 14.0 04/19/2021     04/19/2021    MPV NOT REPORTED 04/19/2021     Lab Results   Component Value Date    TSH 2.05 04/19/2021     Lab Results   Component Value Date    CHOL 192 04/20/2021    HDL 45 04/20/2021    LABA1C 5.6 04/20/2021         Assessment & Plan:        Diagnosis Orders   1. Infarction of right basal ganglia (HCC)     2. Hospital discharge follow-up     3. Hyponatremia  Basic Metabolic Panel   4. Cerebral aneurysm  External Referral To Neurosurgery   5. Essential hypertension     Stroke with minimal to no residual deficits. Reviewed testing from hospitalization. No etiology of the stroke identified. However, certainly she did have risk factors with smoking and hypertension. Patient very nervous about possible future strokes. Advised the best treatment for this is good blood pressure control, smoking cessation, antiplatelet, and statin. Hyponatremia in the hospital, did resolve with cessation of HCTZ but patient may also still have been on IV fluids at that time. Rechecking and may need further investigation. Would also recommend that she start drinking some sports drinks rather than only water. Longtime smoker but had negative chest x-ray. May need CT to assess for neoplasm. Cerebral aneurysm incidentally identified, anatomically not related to stroke. Patient had been referred to a neurosurgeon but wished to stay closer to home. New referral placed. Hypertension controlled, continue current Rx  Patient currently off work for several weeks due to chronic left shoulder pain. From a stroke standpoint, she could return to work when she is cleared from orthopedics.       Requested Prescriptions     Signed Prescriptions Disp Refills    amLODIPine (NORVASC) 10 MG tablet 90 tablet 1     Sig: Take 1 tablet by mouth daily       Patient Instructions

## 2021-04-29 ENCOUNTER — HOSPITAL ENCOUNTER (OUTPATIENT)
Dept: PHYSICAL THERAPY | Age: 61
Setting detail: THERAPIES SERIES
Discharge: HOME | End: 2021-04-29
Payer: COMMERCIAL

## 2021-04-29 PROCEDURE — 97162 PT EVAL MOD COMPLEX 30 MIN: CPT

## 2021-04-29 ASSESSMENT — PAIN SCALES - GENERAL: PAINLEVEL_OUTOF10: 4

## 2021-04-29 NOTE — PROGRESS NOTES
[phone]         Fax: [phone]                      Outpatient Physical Therapy                                                                      Evaluation    Date: 2021  Patient: Fabiano Gonzalez  : 1960  ACCT #: [de-identified]    Referring Practitioner: Dr. Paola Schlatter    Referral Date : 21    Diagnosis: Left rotator cuff tear    Treatment Diagnosis: Left shoulder weakness/pain  Onset Date: 21  PT Insurance Information: BCBS  Total # of Visits Approved: 20 Per Physician Order  Total # of Visits to Date: 1  No Show: 0  Canceled Appointment: 0     Subjective     Additional Pertinent Hx: Left RC injury approx 5 months ago. Underwent x-rays and MRI which revealed small tear. Patient then had a TIA affecting left UE/LE 1 week ago. She presents this date with continued left shoulder pain/weakness as well as mild hand weakness associated with TIA. Patient with good LE strength and gait. Notes difficulty reaching overhead and out to side as well as fine coordination using left hand. UEFS = 53/80.   PMHx includes CVA/TIA 2021  Pain Screening  Patient Currently in Pain: Yes  Pain Assessment  Pain Assessment: 0-10  Pain Level: 4(ranges from 2-6/10)     IADL History  Active : Yes  Occupation: Full time employment(currently off until  medical leave for shoulder)  Type of occupation: MTD - ;   repetitive light to heavy lifting  Leisure & Hobbies: active with daily household tasks    Objective  Vision  Vision: Within Functional Limits  Hearing  Hearing: Within functional limits  Observation/Palpation  Posture: Fair  Palpation: Mild tenderness of supraspinatus fossa region     Strength LUE  Strength LUE: Exception  L Shoulder Flexion: 3/5, 3+/5  L Shoulder ABduction: 3/5  L Elbow Extension: 4+/5  Strength Other  Other:  strength left = 48#; right =50#   3 jaw pinch left = 10#; right = 10#    AROM LUE (degrees)  LUE AROM : Exceptions  L Shoulder Flexion 0-180: 140 deg  L Shoulder ABduction 0-180: 115 deg  L Shoulder Int Rotation  0-70: Fucntionally able to reach behind back to don coat with mild discomfort  L Shoulder Ext Rotation  0-90: Fucntionally able to reach behind head for hair care     PROM LUE (degrees)  LUE General PROM: PROM generally WFL with only minimal discomfort at end ranges of flexion and abduction                                     Assessment  Assessment: Patient presents with left shoulder rotator cuff tear x 5 months however also recently had a CVA affecting her left UE/hand. Minimal residual weakness noted from CVA however she remains limited with shoulder mobility and functional use due to pain and weakness of RC tear.    Plan to progress with ROM and strengthening activities of left shoulder /UE to return to functional use without surgical intervention  Prognosis: Good  Decision Making: Medium Complexity  Exam: UEFS = 53/80    Clinical Presentation:  Evolving  The Following Comorbities will impact the patients progression and Plan of Care:   Stroke and Previous Orthopedic Injury/Surgery       Activity Tolerance: Patient Tolerated treatment well    Education: PT POC;  Issued orange tband for HEP          Goals  Short term goals  Time Frame for Short term goals: 6 visits  Short term goal 1: Educate on home program for left shoulder  ROM and postural strengthening ex    Long term goals  Time Frame for Long term goals : 16 visits  Long term goal 1: PROM WFL in all movements without discomfort  Long term goal 2: Actively reach overhead to upper cabinets  Long term goal 3: Strength left UE to carry gallon of mild  Long term goal 4: Lift up to 10# using left UE to prepare for return to work  Long term goal 5: Subjective left shoulder pain rated <2/10 with daily activities    Patient's Goal:    Get back to work and activity using left shoulder/hand    Timed Code Treatment Minutes: 50 Minutes Time In: 0910  Time Out: [facility] [employer], PT Date: 4/29/2021

## 2021-04-29 NOTE — PLAN OF CARE
[facility]       Phone: [phone]   Date: 2021                      Outpatient Physical Therapy  Fax: [phone]    ACCT #: [de-identified]                     Plan of Care  [facility]#: [id number]  Patient: Vito Roper  : 1960    Referring Practitioner: Dr. Mildred Franco    Referral Date : 21    Diagnosis: Left rotator cuff tear  Onset Date: 21  Treatment Diagnosis: Left shoulder weakness/pain    Assessment: Patient presents with left shoulder rotator cuff tear x 5 months however also recently had a CVA affecting her left UE/hand. Minimal residual weakness noted from CVA however she remains limited with shoulder mobility and functional use due to pain and weakness of RC tear. Plan to progress with ROM and strengthening activities of left shoulder /UE to return to functional use without surgical intervention  Prognosis: Good    Treatment Plan :  Days: 2 times per week Weeks: 8 weeks Total # of Visits Approved: 20    Patient Education/HEP, Therapeutic Exercise and Manual Therapy     Goals  Time Frame for Short term goals: 6 visits  Short term goal 1: Educate on home program for left shoulder  ROM and postural strengthening ex    Time Frame for Long term goals : 16 visits  Long term goal 1: PROM WFL in all movements without discomfort  Long term goal 2: Actively reach overhead to upper cabinets  Long term goal 3: Strength left UE to carry gallon of mild  Long term goal 4: Lift up to 10# using left UE to prepare for return to work  Long term goal 5: Subjective left shoulder pain rated <2/10 with daily activities     AMANDA VELIZ, PT   Date: 2021    ______________________________________ Date: 2021  Physician Signature  By signing above or cosigning electronically, I have reviewed this Plan of Care and certify a need for medically necessary rehabilitation services.

## 2021-05-02 ASSESSMENT — ENCOUNTER SYMPTOMS
EYES NEGATIVE: 1
RESPIRATORY NEGATIVE: 1
DIARRHEA: 0
CONSTIPATION: 0

## 2021-05-03 ENCOUNTER — TELEPHONE (OUTPATIENT)
Dept: FAMILY MEDICINE CLINIC | Age: 61
End: 2021-05-03

## 2021-05-03 NOTE — TELEPHONE ENCOUNTER
Pt phoned office states her stomach hurts all the time whether she eats or not. Pt thinks it is her Meloxicam, states it helps with her shoulder, but wants to know if she can switch to something else.      Pt states she is going to hold off on this medication until she hears back

## 2021-05-04 NOTE — TELEPHONE ENCOUNTER
Pt stopped meloxicam last night has been using tylenol shoulder has been hurting, Pt states she is now have diarrhea and would like to be seen, Pt states she is in so much pain she is rescheduling her physical therapy apt today.  Pt is scheduled for Thursday at 8:20 am

## 2021-05-06 ENCOUNTER — HOSPITAL ENCOUNTER (OUTPATIENT)
Age: 61
Discharge: HOME | End: 2021-05-06
Payer: COMMERCIAL

## 2021-05-06 ENCOUNTER — HOSPITAL ENCOUNTER (OUTPATIENT)
Dept: CT IMAGING | Age: 61
Discharge: HOME | End: 2021-05-08
Payer: COMMERCIAL

## 2021-05-06 ENCOUNTER — OFFICE VISIT (OUTPATIENT)
Dept: FAMILY MEDICINE CLINIC | Age: 61
End: 2021-05-06
Payer: COMMERCIAL

## 2021-05-06 VITALS
BODY MASS INDEX: 16.2 KG/M2 | SYSTOLIC BLOOD PRESSURE: 110 MMHG | HEART RATE: 104 BPM | DIASTOLIC BLOOD PRESSURE: 60 MMHG | WEIGHT: 100.8 LBS | OXYGEN SATURATION: 98 % | HEIGHT: 66 IN

## 2021-05-06 DIAGNOSIS — R93.5 ABNORMAL CT OF THE ABDOMEN: ICD-10-CM

## 2021-05-06 DIAGNOSIS — R19.8: ICD-10-CM

## 2021-05-06 DIAGNOSIS — R10.10 ACUTE UPPER ABDOMINAL PAIN: ICD-10-CM

## 2021-05-06 DIAGNOSIS — K29.00 OTHER ACUTE GASTRITIS WITHOUT HEMORRHAGE: ICD-10-CM

## 2021-05-06 DIAGNOSIS — R10.10 ACUTE UPPER ABDOMINAL PAIN: Primary | ICD-10-CM

## 2021-05-06 LAB
ABSOLUTE EOS #: 0.2 K/UL (ref 0–0.4)
ABSOLUTE IMMATURE GRANULOCYTE: ABNORMAL K/UL (ref 0–0.3)
ABSOLUTE LYMPH #: 1.8 K/UL (ref 1–4.8)
ABSOLUTE MONO #: 0.9 K/UL (ref 0–1)
ALBUMIN SERPL-MCNC: 4.3 G/DL (ref 3.5–5.2)
ALBUMIN/GLOBULIN RATIO: ABNORMAL (ref 1–2.5)
ALP BLD-CCNC: 97 U/L (ref 35–104)
ALT SERPL-CCNC: 16 U/L (ref 5–33)
AMYLASE: 114 U/L (ref 28–100)
ANION GAP SERPL CALCULATED.3IONS-SCNC: 9 MMOL/L (ref 9–17)
AST SERPL-CCNC: 19 U/L
BASOPHILS # BLD: 0 % (ref 0–2)
BASOPHILS ABSOLUTE: 0 K/UL (ref 0–0.2)
BILIRUB SERPL-MCNC: 0.56 MG/DL (ref 0.3–1.2)
BUN BLDV-MCNC: 15 MG/DL (ref 8–23)
BUN/CREAT BLD: 19 (ref 9–20)
CALCIUM SERPL-MCNC: 10.6 MG/DL (ref 8.6–10.4)
CHLORIDE BLD-SCNC: 100 MMOL/L (ref 98–107)
CO2: 28 MMOL/L (ref 20–31)
CREAT SERPL-MCNC: 0.8 MG/DL (ref 0.5–0.9)
DIFFERENTIAL TYPE: YES
EOSINOPHILS RELATIVE PERCENT: 2 % (ref 0–5)
GFR AFRICAN AMERICAN: >60 ML/MIN
GFR NON-AFRICAN AMERICAN: >60 ML/MIN
GFR SERPL CREATININE-BSD FRML MDRD: ABNORMAL ML/MIN/{1.73_M2}
GFR SERPL CREATININE-BSD FRML MDRD: ABNORMAL ML/MIN/{1.73_M2}
GLUCOSE BLD-MCNC: 97 MG/DL (ref 70–99)
HCT VFR BLD CALC: 39 % (ref 36–46)
HEMOGLOBIN: 13.4 G/DL (ref 12–16)
IMMATURE GRANULOCYTES: ABNORMAL %
LIPASE: 37 U/L (ref 13–60)
LYMPHOCYTES # BLD: 21 % (ref 15–40)
MCH RBC QN AUTO: 31 PG (ref 26–34)
MCHC RBC AUTO-ENTMCNC: 34.5 G/DL (ref 31–37)
MCV RBC AUTO: 89.7 FL (ref 80–100)
MONOCYTES # BLD: 10 % (ref 4–8)
NRBC AUTOMATED: ABNORMAL PER 100 WBC
PDW BLD-RTO: 13.1 % (ref 12.1–15.2)
PLATELET # BLD: 430 K/UL (ref 140–450)
PLATELET ESTIMATE: ABNORMAL
PMV BLD AUTO: ABNORMAL FL (ref 6–12)
POTASSIUM SERPL-SCNC: 4.3 MMOL/L (ref 3.7–5.3)
RBC # BLD: 4.34 M/UL (ref 4–5.2)
RBC # BLD: ABNORMAL 10*6/UL
SEG NEUTROPHILS: 67 % (ref 47–75)
SEGMENTED NEUTROPHILS ABSOLUTE COUNT: 5.8 K/UL (ref 2.5–7)
SODIUM BLD-SCNC: 137 MMOL/L (ref 135–144)
TOTAL PROTEIN: 7.5 G/DL (ref 6.4–8.3)
WBC # BLD: 8.7 K/UL (ref 3.5–11)
WBC # BLD: ABNORMAL 10*3/UL

## 2021-05-06 PROCEDURE — 6360000004 HC RX CONTRAST MEDICATION: Performed by: FAMILY MEDICINE

## 2021-05-06 PROCEDURE — 82150 ASSAY OF AMYLASE: CPT

## 2021-05-06 PROCEDURE — 85025 COMPLETE CBC W/AUTO DIFF WBC: CPT

## 2021-05-06 PROCEDURE — 36415 COLL VENOUS BLD VENIPUNCTURE: CPT

## 2021-05-06 PROCEDURE — 99214 OFFICE O/P EST MOD 30 MIN: CPT | Performed by: FAMILY MEDICINE

## 2021-05-06 PROCEDURE — 83690 ASSAY OF LIPASE: CPT

## 2021-05-06 PROCEDURE — 80053 COMPREHEN METABOLIC PANEL: CPT

## 2021-05-06 PROCEDURE — 74177 CT ABD & PELVIS W/CONTRAST: CPT

## 2021-05-06 RX ORDER — PANTOPRAZOLE SODIUM 40 MG/1
40 TABLET, DELAYED RELEASE ORAL
Qty: 30 TABLET | Refills: 0 | Status: SHIPPED | OUTPATIENT
Start: 2021-05-06 | End: 2021-06-04

## 2021-05-06 RX ADMIN — IOPAMIDOL 75 ML: 755 INJECTION, SOLUTION INTRAVENOUS at 11:51

## 2021-05-06 RX ADMIN — IOHEXOL 20 ML: 240 INJECTION, SOLUTION INTRATHECAL; INTRAVASCULAR; INTRAVENOUS; ORAL at 11:46

## 2021-05-06 ASSESSMENT — PATIENT HEALTH QUESTIONNAIRE - PHQ9
SUM OF ALL RESPONSES TO PHQ9 QUESTIONS 1 & 2: 1
1. LITTLE INTEREST OR PLEASURE IN DOING THINGS: 0
SUM OF ALL RESPONSES TO PHQ QUESTIONS 1-9: 1
2. FEELING DOWN, DEPRESSED OR HOPELESS: 1

## 2021-05-06 NOTE — PROGRESS NOTES
Name: Temitope Perez  : 1960         Chief Complaint:     Chief Complaint   Patient presents with    Abdominal Pain       History of Present Illness:      Temitope Perez is a 62 y.o.  female who presents with Abdominal Pain      HPI    Severe upper abd pain onset 6 days ago. Hadn't had BM prior to that. When she finally did, it was diarrhea and was 2 days ago. Brown-yellow in color. Continues to have upper abd pain that spreads into whole abdomen. Only eating soup, which increases pain a little, but if she tries eating anything else the pain is much worse. Takes tums which helps only for a couple minutes. Still taking rx meds though she didn't yet today. BP has been good. Recent addition of plavix, asa, amlodipine, and Crestor d/t stroke 21. Had recently started meloxicam per orthopedic surgeon but discontinued it 5/3 due to the upset stomach. When patient returned to the office to review her test results, she reported that she is taking Pepcid 20 mg twice a day, has been doing that for the past couple weeks. Prior to that, she had had some degree of stomach trouble since her early 25s and had been on Pepcid nightly. Medical History:     Patient Active Problem List   Diagnosis    Chronic eczematous otitis externa of both ears    Essential hypertension    TIA (transient ischemic attack)    Hyponatremia    Moderate malnutrition (HCC)       Medications:       Prior to Admission medications    Medication Sig Start Date End Date Taking?  Authorizing Provider   pantoprazole (PROTONIX) 40 MG tablet Take 1 tablet by mouth every morning (before breakfast) 21  Yes Jayashree Fan, DO   amLODIPine (NORVASC) 10 MG tablet Take 1 tablet by mouth daily 21  Yes Jayashree Fan, DO   aspirin EC 81 MG EC tablet Take 1 tablet by mouth daily 21  Yes Lisa Tapia MD   rosuvastatin (CRESTOR) 40 MG tablet Take 1 tablet by mouth daily 21  Yes Lisa Tapia MD   losartan (COZAAR) 50 MG tablet Take 1 tablet by mouth daily 4/21/21  Yes Smith Trinidad MD   nicotine (NICODERM CQ) 21 MG/24HR Place 1 patch onto the skin daily 4/22/21  Yes Smith Trinidad MD   clopidogrel (PLAVIX) 75 MG tablet Take 1 tablet by mouth daily 4/21/21  Yes Smith Trinidad MD   fluocinolone (DERMOTIC) 0.01 % OIL oil Put 4 drops into the affected ear(s) twice daily x 2 weeks. After that can decrease use to 1-2 times a week for maintenance. 1/4/21  Yes AGNES Harrell   Probiotic Product (PROBIOTIC-10) CAPS Take by mouth daily   Yes Historical Provider, MD   famotidine (PEPCID) 20 MG tablet Take 20 mg by mouth 2 times daily   Yes Historical Provider, MD   Multiple Vitamin (MULTI-VITAMIN DAILY PO) Take by mouth   Yes Historical Provider, MD   Ascorbic Acid (VITAMIN C) 250 MG tablet Take 250 mg by mouth daily   Yes Historical Provider, MD   Cholecalciferol (VITAMIN D3) 2000 units CAPS Take by mouth   Yes Historical Provider, MD   acetaminophen (TYLENOL) 325 MG tablet Take 325 mg by mouth every 6 hours as needed for Pain    Historical Provider, MD   diphenhydrAMINE (BENADRYL) 25 MG tablet Take 25 mg by mouth every 6 hours as needed for Itching    Historical Provider, MD        Allergies: Other    Review ofSystems:     Positive and Negative as described in HPI    Review of Systems    Physical Exam:     Vitals:  /60 (Site: Left Upper Arm, Position: Sitting, Cuff Size: Medium Adult)   Pulse 104   Ht 5' 6\" (1.676 m)   Wt 100 lb 12.8 oz (45.7 kg)   LMP  (LMP Unknown)   SpO2 98%   BMI 16.27 kg/m²   Physical Exam  Constitutional:       Appearance: She is ill-appearing (uncomfortable, holding abdomen). Cardiovascular:      Rate and Rhythm: Normal rate and regular rhythm. Pulmonary:      Effort: No respiratory distress. Breath sounds: Normal breath sounds. Abdominal:      General: Bowel sounds are normal.      Palpations: Abdomen is soft. Tenderness:  There is abdominal tenderness (marked epigastric tenderness, guarding, no rebound). Data:     Lab Results   Component Value Date     05/06/2021    K 4.3 05/06/2021     05/06/2021    CO2 28 05/06/2021    BUN 15 05/06/2021    CREATININE 0.80 05/06/2021    GLUCOSE 97 05/06/2021    PROT 7.5 05/06/2021    LABALBU 4.3 05/06/2021    BILITOT 0.56 05/06/2021    ALKPHOS 97 05/06/2021    AST 19 05/06/2021    ALT 16 05/06/2021     Lab Results   Component Value Date    WBC 8.7 05/06/2021    RBC 4.34 05/06/2021    HGB 13.4 05/06/2021    HCT 39.0 05/06/2021    MCV 89.7 05/06/2021    MCH 31.0 05/06/2021    MCHC 34.5 05/06/2021    RDW 13.1 05/06/2021     05/06/2021    MPV NOT REPORTED 05/06/2021     Lab Results   Component Value Date    TSH 2.05 04/19/2021     Lab Results   Component Value Date    CHOL 192 04/20/2021    HDL 45 04/20/2021    LABA1C 5.6 04/20/2021         Assessment & Plan:        Diagnosis Orders   1. Acute upper abdominal pain  Comprehensive Metabolic Panel    CBC Auto Differential    Lipase    Amylase    CT ABDOMEN PELVIS W IV CONTRAST Additional Contrast? Oral    Ambulatory referral to General Surgery   2. Guarding of epigastrium  Comprehensive Metabolic Panel    CBC Auto Differential    Lipase    Amylase    CT ABDOMEN PELVIS W IV CONTRAST Additional Contrast? Oral   3. Other acute gastritis without hemorrhage  Ambulatory referral to General Surgery   4. Abnormal CT of the abdomen  Ambulatory referral to General Surgery   Severe epigastric pain, worse with eating. Persistent over the course of several days. Differential diagnosis includes severe gastritis, peptic ulcer disease, pancreatitis. New meds though none which are typically associated with pancreatitis. Stat labs and CT ordered as above. After testing, patient return to office and we reviewed the CT. Abnormal thickening of distal stomach which could represent a gastritis but also potentially could represent neoplasm. Starting Protonix.   Continue soft, thin diet.  Referred to surgeon for possible EGD. Stay off NSAIDs but continue dual antiplatelet treatment due to recent stroke. No anemia and has not had melena. Requested Prescriptions     Signed Prescriptions Disp Refills    pantoprazole (PROTONIX) 40 MG tablet 30 tablet 0     Sig: Take 1 tablet by mouth every morning (before breakfast)         There are no Patient Instructions on file for this visit. Marivel Goddard received counseling on the following healthy behaviors: medication adherence  Reviewed prior labs and health maintenance. Continue current medications, diet and exercise. Discussed use, benefit, and side effects of prescribed medications. Barriers to medication compliance addressed. Patient given educational materials - see patient instructions. All patient questions answered. Patient voiced understanding.        Electronically signed by Cris Muniz DO on 5/6/2021 at 7:43 PM  [address]  Dept: [phone]

## 2021-05-07 ENCOUNTER — HOSPITAL ENCOUNTER (OUTPATIENT)
Dept: PHYSICAL THERAPY | Age: 61
Setting detail: THERAPIES SERIES
Discharge: HOME | End: 2021-05-07
Payer: COMMERCIAL

## 2021-05-07 PROCEDURE — 97110 THERAPEUTIC EXERCISES: CPT

## 2021-05-07 NOTE — PROGRESS NOTES
Phone: 022 Jarrell Cade      Fax: [phone]                            Outpatient Physical Therapy                                                                            Daily Note    Date: 2021  Patient Name: Andreina Ventura        MRN: [id number]   ACCT#:  [de-identified]  : 1960  (62 y.o.)    Referring Practitioner: Dr. Alexi Wood    Referral Date : 21    Diagnosis: Left rotator cuff tear  Treatment Diagnosis: Left shoulder weakness/pain    Onset Date: 21  PT Insurance Information: BCBS  Total # of Visits Approved: 20 Per Physician Order  Total # of Visits to Date: 2  No Show: 0  Canceled Appointment: 0  Plan of Care/Certification Expiration Date: 21    Pre-Treatment Pain:  0/10     Assessment  Assessment: Patient reports no pain prior to session. Reports non compliance with current HEP due to having stomach issues from medication. Completes exercises followed by PROM to L shoulder.   Chart Reviewed: Yes    Plan  Plan: Plan of care initiated    Exercises/Modalities/Manual:  See DocFlow Sheet    Education:           Goals  (Total # of Visits to Date: 2)   Short Term Goals - Time Frame for Short term goals: 6 visits  Short term goal 1: Educate on home program for left shoulder  ROM and postural strengthening ex                Long Term Goals - Time Frame for Long term goals : 16 visits  Long term goal 1: PROM WFL in all movements without discomfort  Long term goal 2: Actively reach overhead to upper cabinets  Long term goal 3: Strength left UE to carry gallon of mild  Long term goal 4: Lift up to 10# using left UE to prepare for return to work  Long term goal 5: Subjective left shoulder pain rated <2/10 with daily activities    Post Treatment Pain:  0/10    Time In: 1433  Time Out : 1503        Timed Code Treatment Minutes: 30 Minutes  Total Treatment Time: 1015 [facility]    Date: 2021

## 2021-05-10 ENCOUNTER — OFFICE VISIT (OUTPATIENT)
Dept: SURGERY | Age: 61
End: 2021-05-10
Payer: COMMERCIAL

## 2021-05-10 ENCOUNTER — TELEPHONE (OUTPATIENT)
Dept: SURGERY | Age: 61
End: 2021-05-10

## 2021-05-10 VITALS
WEIGHT: 104 LBS | HEIGHT: 66 IN | BODY MASS INDEX: 16.71 KG/M2 | OXYGEN SATURATION: 98 % | RESPIRATION RATE: 16 BRPM | TEMPERATURE: 97.3 F | HEART RATE: 86 BPM

## 2021-05-10 DIAGNOSIS — R10.13 EPIGASTRIC PAIN: Primary | ICD-10-CM

## 2021-05-10 DIAGNOSIS — R93.5 ABNORMAL CT OF THE ABDOMEN: ICD-10-CM

## 2021-05-10 DIAGNOSIS — Z86.73 HISTORY OF CVA (CEREBROVASCULAR ACCIDENT) WITHOUT RESIDUAL DEFICITS: ICD-10-CM

## 2021-05-10 PROCEDURE — 99202 OFFICE O/P NEW SF 15 MIN: CPT | Performed by: SURGERY

## 2021-05-10 RX ORDER — SUCRALFATE 1 G/1
1 TABLET ORAL 4 TIMES DAILY
Qty: 120 TABLET | Refills: 3 | Status: SHIPPED | OUTPATIENT
Start: 2021-05-10 | End: 2022-02-24

## 2021-05-10 NOTE — TELEPHONE ENCOUNTER
Returned call to patient. Patient advised to begin Carafate per Dr Rigo Valle. Instructed to hold after midnight tomorrow night prior to EGD scheduled 05/12/21. Patient verbalized understanding.

## 2021-05-11 ENCOUNTER — HOSPITAL ENCOUNTER (OUTPATIENT)
Dept: PHYSICAL THERAPY | Age: 61
Setting detail: THERAPIES SERIES
Discharge: HOME | End: 2021-05-11
Payer: COMMERCIAL

## 2021-05-11 ENCOUNTER — HOSPITAL ENCOUNTER (OUTPATIENT)
Dept: PREADMISSION TESTING | Age: 61
Setting detail: SPECIMEN
Discharge: HOME | End: 2021-05-11
Payer: COMMERCIAL

## 2021-05-11 ENCOUNTER — ANESTHESIA EVENT (OUTPATIENT)
Dept: OPERATING ROOM | Age: 61
End: 2021-05-11
Payer: COMMERCIAL

## 2021-05-11 DIAGNOSIS — Z01.818 PRE-OP TESTING: ICD-10-CM

## 2021-05-11 DIAGNOSIS — Z01.818 PRE-OP TESTING: Primary | ICD-10-CM

## 2021-05-11 LAB
SARS-COV-2, RAPID: NOT DETECTED
SPECIMEN DESCRIPTION: NORMAL

## 2021-05-11 PROCEDURE — 97110 THERAPEUTIC EXERCISES: CPT

## 2021-05-11 PROCEDURE — 87635 SARS-COV-2 COVID-19 AMP PRB: CPT

## 2021-05-11 PROCEDURE — C9803 HOPD COVID-19 SPEC COLLECT: HCPCS

## 2021-05-11 PROCEDURE — 97140 MANUAL THERAPY 1/> REGIONS: CPT

## 2021-05-11 NOTE — PROGRESS NOTES
Phone: Tucker Cade      Fax: [phone]                            Outpatient Physical Therapy                                                                            Daily Note    Date: 2021  Patient Name: Gurjit Kamara        MRN: [id number]   ACCT#:  [de-identified]  : 1960  (62 y.o.)    Referring Practitioner: Dr. Vic Otto    Referral Date : 21    Diagnosis: Left rotator cuff tear  Treatment Diagnosis: Left shoulder weakness/pain    Onset Date: 21  PT Insurance Information: BCBS  Total # of Visits Approved: 20 Per Physician Order  Total # of Visits to Date: 3  No Show: 0  Canceled Appointment: 0  Plan of Care/Certification Expiration Date: 21    Pre-Treatment Pain:  10     Assessment  Assessment: Patient rates overall left shoulder pain at 4/10. She states she has been unable to complete HEP due to other medical issued an MD appts as well as limited sleep; patient very anxious regarding current medical status. Completed ex per log with good tolerance. PROM WFL ; AROM  flexion limited to approx 120deg before discomfort and 90 deg abduction. To  next Friday.   Progress with postural strengthening as able and plan to perform scapular/GH taping next session  Chart Reviewed: Yes    Plan  Plan: Continue with current plan    Exercises/Modalities/Manual:  See DocFlow Sheet    Education:           Goals  (Total # of Visits to Date: 3)   Short Term Goals - Time Frame for Short term goals: 6 visits  Short term goal 1: Educate on home program for left shoulder  ROM and postural strengthening ex                Long Term Goals - Time Frame for Long term goals : 16 visits  Long term goal 1: PROM WFL in all movements without discomfort  Long term goal 2: Actively reach overhead to upper cabinets  Long term goal 3: Strength left UE to carry gallon of mild  Long term goal 4: Lift up to 10# using left UE to prepare for return to work  Long term goal 5: Subjective left shoulder pain rated <2/10 with daily activities    Post Treatment Pain:  4/10    Time In: 1015    Time Out : 1100        Timed Code Treatment Minutes: 40 Minutes  Total Treatment Time: Tamiko Puente 38, PT     Date: 5/11/2021

## 2021-05-12 ENCOUNTER — HOSPITAL ENCOUNTER (OUTPATIENT)
Age: 61
Setting detail: OUTPATIENT SURGERY
Discharge: HOME | End: 2021-05-12
Attending: SURGERY | Admitting: SURGERY
Payer: COMMERCIAL

## 2021-05-12 ENCOUNTER — ANESTHESIA (OUTPATIENT)
Dept: OPERATING ROOM | Age: 61
End: 2021-05-12
Payer: COMMERCIAL

## 2021-05-12 VITALS
RESPIRATION RATE: 10 BRPM | DIASTOLIC BLOOD PRESSURE: 57 MMHG | OXYGEN SATURATION: 100 % | SYSTOLIC BLOOD PRESSURE: 134 MMHG

## 2021-05-12 VITALS
OXYGEN SATURATION: 100 % | RESPIRATION RATE: 13 BRPM | SYSTOLIC BLOOD PRESSURE: 133 MMHG | HEART RATE: 75 BPM | DIASTOLIC BLOOD PRESSURE: 74 MMHG | TEMPERATURE: 97.8 F

## 2021-05-12 PROBLEM — R10.13 EPIGASTRIC PAIN: Status: ACTIVE | Noted: 2021-05-12

## 2021-05-12 PROCEDURE — 7100000011 HC PHASE II RECOVERY - ADDTL 15 MIN: Performed by: SURGERY

## 2021-05-12 PROCEDURE — 88305 TISSUE EXAM BY PATHOLOGIST: CPT

## 2021-05-12 PROCEDURE — 3700000001 HC ADD 15 MINUTES (ANESTHESIA): Performed by: SURGERY

## 2021-05-12 PROCEDURE — 87077 CULTURE AEROBIC IDENTIFY: CPT

## 2021-05-12 PROCEDURE — 3609017100 HC EGD: Performed by: SURGERY

## 2021-05-12 PROCEDURE — 3700000000 HC ANESTHESIA ATTENDED CARE: Performed by: SURGERY

## 2021-05-12 PROCEDURE — 2709999900 HC NON-CHARGEABLE SUPPLY: Performed by: SURGERY

## 2021-05-12 PROCEDURE — 2500000003 HC RX 250 WO HCPCS: Performed by: NURSE ANESTHETIST, CERTIFIED REGISTERED

## 2021-05-12 PROCEDURE — 7100000010 HC PHASE II RECOVERY - FIRST 15 MIN: Performed by: SURGERY

## 2021-05-12 PROCEDURE — 6360000002 HC RX W HCPCS: Performed by: NURSE ANESTHETIST, CERTIFIED REGISTERED

## 2021-05-12 PROCEDURE — 2580000003 HC RX 258: Performed by: SURGERY

## 2021-05-12 RX ORDER — SODIUM CHLORIDE, SODIUM LACTATE, POTASSIUM CHLORIDE, CALCIUM CHLORIDE 600; 310; 30; 20 MG/100ML; MG/100ML; MG/100ML; MG/100ML
INJECTION, SOLUTION INTRAVENOUS CONTINUOUS
Status: DISCONTINUED | OUTPATIENT
Start: 2021-05-12 | End: 2021-05-12 | Stop reason: HOSPADM

## 2021-05-12 RX ORDER — SODIUM CHLORIDE 0.9 % (FLUSH) 0.9 %
10 SYRINGE (ML) INJECTION PRN
Status: DISCONTINUED | OUTPATIENT
Start: 2021-05-12 | End: 2021-05-12 | Stop reason: HOSPADM

## 2021-05-12 RX ORDER — SODIUM CHLORIDE 9 MG/ML
25 INJECTION, SOLUTION INTRAVENOUS PRN
Status: DISCONTINUED | OUTPATIENT
Start: 2021-05-12 | End: 2021-05-12 | Stop reason: HOSPADM

## 2021-05-12 RX ORDER — LIDOCAINE HYDROCHLORIDE 20 MG/ML
INJECTION, SOLUTION INFILTRATION; PERINEURAL PRN
Status: DISCONTINUED | OUTPATIENT
Start: 2021-05-12 | End: 2021-05-12 | Stop reason: SDUPTHER

## 2021-05-12 RX ORDER — SODIUM CHLORIDE 0.9 % (FLUSH) 0.9 %
5-40 SYRINGE (ML) INJECTION PRN
Status: DISCONTINUED | OUTPATIENT
Start: 2021-05-12 | End: 2021-05-12 | Stop reason: HOSPADM

## 2021-05-12 RX ORDER — SODIUM CHLORIDE 0.9 % (FLUSH) 0.9 %
10 SYRINGE (ML) INJECTION EVERY 12 HOURS SCHEDULED
Status: DISCONTINUED | OUTPATIENT
Start: 2021-05-12 | End: 2021-05-12 | Stop reason: HOSPADM

## 2021-05-12 RX ORDER — PROPOFOL 10 MG/ML
INJECTION, EMULSION INTRAVENOUS CONTINUOUS PRN
Status: DISCONTINUED | OUTPATIENT
Start: 2021-05-12 | End: 2021-05-12 | Stop reason: SDUPTHER

## 2021-05-12 RX ORDER — SODIUM CHLORIDE 0.9 % (FLUSH) 0.9 %
5-40 SYRINGE (ML) INJECTION EVERY 12 HOURS SCHEDULED
Status: DISCONTINUED | OUTPATIENT
Start: 2021-05-12 | End: 2021-05-12 | Stop reason: HOSPADM

## 2021-05-12 RX ADMIN — SODIUM CHLORIDE, POTASSIUM CHLORIDE, SODIUM LACTATE AND CALCIUM CHLORIDE: 600; 310; 30; 20 INJECTION, SOLUTION INTRAVENOUS at 12:13

## 2021-05-12 RX ADMIN — PROPOFOL 200 MCG/KG/MIN: 10 INJECTION, EMULSION INTRAVENOUS at 12:44

## 2021-05-12 RX ADMIN — LIDOCAINE HYDROCHLORIDE 80 MG: 20 INJECTION, SOLUTION INFILTRATION; PERINEURAL at 12:44

## 2021-05-12 ASSESSMENT — PAIN SCALES - GENERAL
PAINLEVEL_OUTOF10: 0
PAINLEVEL_OUTOF10: 0

## 2021-05-12 NOTE — ANESTHESIA POSTPROCEDURE EVALUATION
Department of Anesthesiology  Postprocedure Note    Patient: Devante Serra  MRN: [id number]  YOB: 1960  Date of evaluation: 5/12/2021  Time:  2:12 PM     Procedure Summary     Date: 05/12/21 Room / Location: [address] [place] [address]    Anesthesia Start: 1244 Anesthesia Stop: 3998    Procedure: EGD ESOPHAGOGASTRODUODENOSCOPY WITH BIOPSIES (N/A ) Diagnosis: (EPIGASTRIC PAIN, GASTRIC THICKENING ON CT)    Surgeons: David Belle MD Responsible Provider: Rosita Saxena. ROGELIO Ruiz CRNA    Anesthesia Type: MAC ASA Status: 2          Anesthesia Type: MAC    Yuliana Phase I: Yuliana Score: 10    Yuliana Phase II: Yuliana Score: 10    Last vitals: Reviewed and per EMR flowsheets.        Anesthesia Post Evaluation    Patient location during evaluation: PACU  Patient participation: complete - patient participated  Level of consciousness: awake and alert  Pain score: 0  Airway patency: patent  Nausea & Vomiting: no nausea and no vomiting  Complications: no  Cardiovascular status: blood pressure returned to baseline  Respiratory status: acceptable and room air  Hydration status: stable

## 2021-05-12 NOTE — PATIENT INSTRUCTIONS
Patient Education        Upper GI Endoscopy: Before Your Procedure  What is an upper GI endoscopy? An upper gastrointestinal (or GI) endoscopy is a test that allows your doctor to look at the inside of your esophagus, stomach, and the first part of your small intestine, called the duodenum. The esophagus is the tube that carries food to your stomach. The doctor uses a thin, lighted tube that bends. It is called an endoscope, or scope. The doctor puts the tip of the scope in your mouth and gently moves it down your throat. The scope is a flexible video camera. The doctor looks at a monitor (like a TV set or a computer screen) as he or she moves the scope. A doctor may do this procedure to look for ulcers, tumors, infection, or bleeding. It also can be used to look for signs of acid backing up into your esophagus. This is called gastroesophageal reflux disease, or GERD. The doctor can use the scope to take a sample of tissue for study (a biopsy). The doctor also can use the scope to take out growths or stop bleeding. Follow-up care is a key part of your treatment and safety. Be sure to make and go to all appointments, and call your doctor if you are having problems. It's also a good idea to know your test results and keep a list of the medicines you take. How do you prepare for the procedure? Procedures can be stressful. This information will help you understand what you can expect. And it will help you safely prepare for your procedure. Preparing for the procedure    · Do not eat or drink anything for 6 to 8 hours before the test. An empty stomach helps your doctor see your stomach clearly during the test. It also reduces your chances of vomiting. If you vomit, there is a small risk that the vomit could enter your lungs.  (This is called aspiration.) If the test is done in an emergency, a tube may be inserted through your nose or mouth to empty your stomach.     · Do not take sucralfate (Carafate) or antacids on the day of the test. These medicines can make it hard for your doctor to see your upper GI tract.     · If your doctor tells you to, stop taking iron supplements 7 to 14 days before the test.     · Be sure you have someone to take you home. Anesthesia and pain medicine will make it unsafe for you to drive or get home on your own.     · Understand exactly what procedure is planned, along with the risks, benefits, and other options. · Tell your doctor ALL the medicines, vitamins, supplements, and herbal remedies you take. Some may increase the risk of problems during your procedure. Your doctor will tell you if you should stop taking any of them before the procedure and how soon to do it.     · If you take aspirin or some other blood thinner, ask your doctor if you should stop taking it before your procedure. Make sure that you understand exactly what your doctor wants you to do. These medicines increase the risk of bleeding.     · Make sure your doctor and the hospital have a copy of your advance directive. If you don't have one, you may want to prepare one. It lets others know your health care wishes. It's a good thing to have before any type of surgery or procedure. What happens on the day of the procedure? · Follow the instructions exactly about when to stop eating and drinking. If you don't, your procedure may be canceled. If your doctor told you to take your medicines on the day of the procedure, take them with only a sip of water.     · Take a bath or shower before you come in for your procedure. Do not apply lotions, perfumes, deodorants, or nail polish.     · Take off all jewelry and piercings. And take out contact lenses, if you wear them. At the hospital or surgery center   · Bring a picture ID.     · The test may take 15 to 30 minutes.     · The doctor may spray medicine on the back of your throat to numb it.  You also will get medicine to prevent pain and to relax you.     · You will lie on your left side. The doctor will put the scope in your mouth and toward the back of your throat. The doctor will tell you when to swallow. This helps the scope move down your throat. You will be able to breathe normally. The doctor will move the scope down your esophagus into your stomach. The doctor also may look at the duodenum.     · If your doctor wants to take a sample of tissue for a biopsy, he or she may use small surgical tools, which are put into the scope, to cut off some tissue. You will not feel a biopsy, if one is taken. The doctor also can use the tools to stop bleeding or to do other treatments, if needed.     · You will stay at the hospital or surgery center for 1 to 2 hours until the medicine you were given wears off. What happens after an upper GI endoscopy? · After the test, you may belch and feel bloated for a while.     · You may have a tickling, dry throat or mouth. You may feel a bit hoarse, and you may have a mild sore throat. These symptoms may last several days. Throat lozenges and warm saltwater gargles can help relieve the throat symptoms.     · Don't drive or operate machinery for 12 hours after the test.     · Your doctor will tell you when you can go back to your usual diet and activities.     · Don't drink alcohol for 12 to 24 hours after the test.   When should you call your doctor? · You have questions or concerns.     · You don't understand how to prepare for your procedure.     · You become ill before the procedure (such as fever, flu, or a cold).     · You need to reschedule or have changed your mind about having the procedure. Where can you learn more? Go to [email or web address].Idea [email or web address] org and sign in to your [employer] account. Enter P790 in the Financial Fairy Tales box to learn more about \"Upper GI Endoscopy: Before Your Procedure. \"     If you do not have an account, please click on the \"Sign Up Now\" link.   Current as of: April 15, 2020               Content Version: 12.8  © 7597-5540 Healthwise, Incorporated. Care instructions adapted under license by [facility] ([facility]). If you have questions about a medical condition or this instruction, always ask your healthcare professional. [address] any warranty or liability for your use of this information.

## 2021-05-12 NOTE — OP NOTE
[address], [place] [address]                                OPERATIVE REPORT    PATIENT NAME: Candelario Sheth                   :        1960  MED REC NO:   [id number]                              ROOM:  ACCOUNT NO:   [de-identified]                           ADMIT DATE: 2021  PROVIDER:     Ravi Martinez    DATE OF PROCEDURE:  2021    ENDOSCOPY REPORT    ATTENDING SURGEON:  Dr. Ravi Martinez. PRIMARY CARE PHYSICIAN:  Eric Cortez DO    PREOPERATIVE DIAGNOSES:  1.  Epigastric pain. 2.  Abnormal CT scan of abdomen, gastric thickening. POSTOPERATIVE DIAGNOSES:  1. Type 1 antral ulcer, lesser curvature. 2.  Diffuse gastritis, upper end.  3.  Small hiatal hernia, type 1. PROCEDURES PERFORMED:  1. Esophagogastroduodenoscopy. 2.  Prepyloric antral biopsies. 3.  Type 1 ulcer margin biopsy of lesser curvature. ANESTHESIA:  MAC.    ESTIMATED BLOOD LOSS:  Less than 20 mL. SPECIMENS:  1. Prepyloric antral biopsies. 2.  Type 1 gastric ulcer margin biopsy, lesser curvature. INDICATIONS:  The patient is a 61-year-old white female presenting for  evaluation of abdominal pain. She has a long history of intermittent  epigastric pain with occasional reflux symptoms. CT scan of abdomen and  pelvis on 2021 showing a circumferential thickening of the distal  stomach and pylorus worrisome for gastritis versus neoplasm. Recent CVA  without deficits. No previous GI surgery nor endoscopy. Recently quit  tobacco, less than one month ago. At this time, diagnostic endoscopy is  indicated. DESCRIPTION OF PROCEDURE:  After obtaining informed consent with  discussion of the risks, benefits, and alternatives including a risk of  GI bleeding, perforation, missed lesions, COVID-19 exposure/infection,  etc., the patient was taken to the endoscopy suite and placed in the  left lateral recumbent position.   Following adequate IV sedation, an  endoscope was passed over the tongue into the posterior pharynx. Vocal  folds were visualized and appeared normal.  The scope was directed into  the esophagus and onto the GE junction. Upper and mid esophagus all  appeared normal.  In the lower esophagus, a small sliding type 1 hiatal  hernia was present. There were no rings, no webs, no varices, no  strictures, and no esophagitis. The stomach was entered and had normal  distensibility. On retroflexion, the fundus and cardia appeared normal.  The body and prepyloric antrum had diffuse gastritis. A large type 1  gastric ulcer was noted along the lesser curvature. Biopsies of the  ulcer margin were obtained. Antral gastritis was also present. Prepyloric antral biopsies were obtained. Pylorus was patent. The  duodenum was entered. First, second, and third portions of the duodenum  were normal.  The stomach was decompressed by suction as the scope was  removed. There is no evidence of malignant nor other neoplastic process  present. The gastritis and ulceration does explain the circumferential  thickening seen on recent CT imaging. DISPOSITION:  To PACU awake, alert, and stable. Following recovery, we  will discharge the patient home with gradual advancement of diet and  activity as tolerated with instructions to continue Protonix and  Carafate. Strongly encourage ongoing complete tobacco cessation. Followup will be with me in one to two weeks to review pathology. COMPLICATIONS:  None.         Garrett Rhodes    D: 05/12/2021 13:14:30       T: 05/12/2021 13:21:31     [id number]  Job#: [id number]     Doc#: [id number]    CC:  Emilee Hernandez

## 2021-05-12 NOTE — PROGRESS NOTES
Patient states readiness to go home; discharge instructions given to patient and patient spouse, verbalizes understanding and offers no questions at this time. Discharge Criteria    Inpatients must meet Criteria 1 through 7. All other patients are either YES or N/A. If a NO is chosen then Anesthesia or Surgeon must be notified. 1.  Minimum 30 minutes after last dose of sedative medication, minimum 120 minutes after last dose of reversal agent. Yes      2. Systolic BP stable within 20 mmHg for 30 minutes & systolic BP between 90 & 407 or within 10 mmHg of baseline. Yes      3. Pulse between 60 and 100 or within 10 bpm of baseline. Yes      4. Spontaneous respiratory rate >/= 10 per minute. Yes      5. SaO2 >/= 95 or  >/= baseline. Yes      6. Able to cough and swallow or return to baseline function. Yes      7. Alert and oriented or return to baseline mental status. Yes      8. Demonstrates controlled, coordinated movements, ambulates with steady gait, or return to baseline activity function. Yes      9. Minimal or no pain or nausea, or at a level tolerable and acceptable to patient. Yes      10. Takes and retains oral fluids as allowed. Yes      11. Procedural / perioperative site stable. Minimal or no bleeding. Yes          12. If GI endoscopy procedure, minimal or no abdominal distention or passing flatus. Yes      13. Written discharge instructions and emergency telephone number provided. Yes      14. Accompanied by a responsible adult.     Yes

## 2021-05-12 NOTE — ANESTHESIA PRE PROCEDURE
Department of Anesthesiology  Preprocedure Note       Name:  Alivia Kuo   Age:  62 y.o.  :  1960                                          MRN:  [id number]         Date:  2021      Surgeon: Sal Sandra):  Sulma Yen MD    Procedure: Procedure(s):  EGD ESOPHAGOGASTRODUODENOSCOPY    Medications prior to admission:   Prior to Admission medications    Medication Sig Start Date End Date Taking? Authorizing Provider   sucralfate (CARAFATE) 1 GM tablet Take 1 tablet by mouth 4 times daily 5/10/21  Yes Sulma Yen MD   pantoprazole (PROTONIX) 40 MG tablet Take 1 tablet by mouth every morning (before breakfast) 21  Yes Oh Cueto DO   amLODIPine (NORVASC) 10 MG tablet Take 1 tablet by mouth daily 21  Yes Oh Cueot DO   aspirin EC 81 MG EC tablet Take 1 tablet by mouth daily 21  Yes Kristofer Wilson MD   rosuvastatin (CRESTOR) 40 MG tablet Take 1 tablet by mouth daily 21  Yes Kristofer Wilson MD   losartan (COZAAR) 50 MG tablet Take 1 tablet by mouth daily 21  Yes Kristofer Wilson MD   nicotine (NICODERM CQ) 21 MG/24HR Place 1 patch onto the skin daily 21  Yes Kristofer Wilson MD   clopidogrel (PLAVIX) 75 MG tablet Take 1 tablet by mouth daily 21  Yes Kristofer Wilson MD   fluocinolone (DERMOTIC) 0.01 % OIL oil Put 4 drops into the affected ear(s) twice daily x 2 weeks. After that can decrease use to 1-2 times a week for maintenance.  21  Yes AGNES Harrell   Probiotic Product (PROBIOTIC-10) CAPS Take by mouth daily   Yes Historical Provider, MD   famotidine (PEPCID) 20 MG tablet Take 20 mg by mouth 2 times daily   Yes Historical Provider, MD   Multiple Vitamin (MULTI-VITAMIN DAILY PO) Take by mouth   Yes Historical Provider, MD   Ascorbic Acid (VITAMIN C) 250 MG tablet Take 250 mg by mouth daily   Yes Historical Provider, MD   Cholecalciferol (VITAMIN D3) 2000 units CAPS Take by mouth   Yes Historical Provider, MD   acetaminophen (TYLENOL) 325 MG tablet Take 325 mg by mouth every 6 hours as needed for Pain   Yes Historical Provider, MD       Current medications:    Current Facility-Administered Medications   Medication Dose Route Frequency Provider Last Rate Last Admin    lactated ringers infusion   Intravenous Continuous Shameka Curiel MD        lactated ringers infusion   Intravenous Continuous Shameka Curiel  mL/hr at 21 1213 New Bag at 21 1213    sodium chloride flush 0.9 % injection 5-40 mL  5-40 mL Intravenous 2 times per day Shameka Curiel MD        sodium chloride flush 0.9 % injection 5-40 mL  5-40 mL Intravenous PRN Shameka Curiel MD        0.9 % sodium chloride infusion  25 mL Intravenous PRN Shameka Curiel MD           Allergies: Allergies   Allergen Reactions    Other      Neoprene       Problem List:    Patient Active Problem List   Diagnosis Code    Chronic eczematous otitis externa of both ears H60.8X3    Essential hypertension I10    TIA (transient ischemic attack) G45.9    Hyponatremia E87.1    Moderate malnutrition ([address] E44.0    Epigastric pain R10.13       Past Medical History:        Diagnosis Date    Allergic rhinitis     dust mites and nickel    Essential hypertension 2021    Transient ischemic attack 2021       Past Surgical History:        Procedure Laterality Date     SECTION  1985    2nd in 12       Social History:    Social History     Tobacco Use    Smoking status: Former Smoker     Packs/day: 0.50     Years: 30.00     Pack years: 15.00     Quit date: 2021     Years since quittin.0    Smokeless tobacco: Never Used   Substance Use Topics    Alcohol use:  No                                Counseling given: Not Answered      Vital Signs (Current):   Vitals:    21 1200   BP: (!) 119/52   Pulse: 70   Resp: 15   Temp: 36.5 °C (97.7 °F)   TempSrc: Temporal   SpO2: 97%                                              BP Readings from Last 3 Encounters:   05/12/21 (!) 119/52   05/06/21 110/60   04/28/21 132/66       NPO Status: Time of last liquid consumption: 2350                        Time of last solid consumption: 2300                        Date of last liquid consumption: 05/11/21                        Date of last solid food consumption: 05/11/21    BMI:   Wt Readings from Last 3 Encounters:   05/10/21 104 lb (47.2 kg)   05/06/21 100 lb 12.8 oz (45.7 kg)   04/28/21 104 lb (47.2 kg)     There is no height or weight on file to calculate BMI.    CBC:   Lab Results   Component Value Date    WBC 8.7 05/06/2021    RBC 4.34 05/06/2021    HGB 13.4 05/06/2021    HCT 39.0 05/06/2021    MCV 89.7 05/06/2021    RDW 13.1 05/06/2021     05/06/2021       CMP:   Lab Results   Component Value Date     05/06/2021    K 4.3 05/06/2021     05/06/2021    CO2 28 05/06/2021    BUN 15 05/06/2021    CREATININE 0.80 05/06/2021    GFRAA >60 05/06/2021    LABGLOM >60 05/06/2021    GLUCOSE 97 05/06/2021    PROT 7.5 05/06/2021    CALCIUM 10.6 05/06/2021    BILITOT 0.56 05/06/2021    ALKPHOS 97 05/06/2021    AST 19 05/06/2021    ALT 16 05/06/2021       POC Tests: No results for input(s): POCGLU, POCNA, POCK, POCCL, POCBUN, POCHEMO, POCHCT in the last 72 hours.     Coags: No results found for: PROTIME, INR, APTT    HCG (If Applicable): No results found for: PREGTESTUR, PREGSERUM, HCG, HCGQUANT     ABGs: No results found for: PHART, PO2ART, XDY4DKF, HUD0FJQ, BEART, M9XSURAK     Type & Screen (If Applicable):  No results found for: LABABO, LABRH    Drug/Infectious Status (If Applicable):  No results found for: HIV, HEPCAB    COVID-19 Screening (If Applicable):   Lab Results   Component Value Date    COVID19 Not Detected 05/11/2021    COVID19 Not Detected 09/30/2020           Anesthesia Evaluation  Patient summary reviewed and Nursing notes reviewed  Airway: Mallampati: I  TM distance: >3 FB   Neck ROM: full  Mouth opening: > = 3 FB Dental:    (+) edentulous Pulmonary:Negative Pulmonary ROS and normal exam                               Cardiovascular:    (+) hypertension:,                   Neuro/Psych:               GI/Hepatic/Renal:   (+) GERD: well controlled,           Endo/Other: Negative Endo/Other ROS                    Abdominal:           Vascular: negative vascular ROS. Anesthesia Plan      MAC     ASA 2       Induction: intravenous. Anesthetic plan and risks discussed with patient.                       ROGELIO Lee - CRNA   5/12/2021

## 2021-05-12 NOTE — PROGRESS NOTES
Aby Magdaleno MD  General Surgery, Endoscopy  Chief Medical Officer    [address] [id number] [address] [address]  Dept: [phone]  Fax: [phone]  Chief Complaint   Patient presents with    New Patient    Abdominal Pain     Patient referred by Dr Rico Cortes for abdominal pain. CT abdomen/pelvis completed on 21. Patient also complains of constipation. Infarction of right basal ganglia 2021. Anticoagulated-Plavix and Aspirin. HPI     Ms Vinie Hodgkins is a pleasant 81-year-old white female kindly referred to me by Dr. Rico Cortes for evaluation of abdominal pain. She has had a long history of intermittent epigastric pain with occasional reflux symptoms. Worsening pain 1 week ago with constipation followed by loose stool. Worse with eating, relieved with GI rest. Some relief with antacids. CT scan abdomen pelvis May 6, 2021 showing circumferential thickening of the distal stomach and pylorus worrisome for gastritis versus neoplasm. Endoscopy has been recommended. Recent CVA without deficits. Currently taking Plavix. Status post  section . No other abdominal surgery, nor endoscopy. Weight loss of less than 10 pounds, currently 104 pounds, BMI 17. Decreased appetite. No family history of GI malignancy to her knowledge. She quit tobacco use less than 1 month ago. Review of Systems    Past Medical History:   Diagnosis Date    Allergic rhinitis     dust mites and nickel    Essential hypertension 2021       Past Surgical History:   Procedure Laterality Date   [address]    2nd in        History reviewed. No pertinent family history.     Allergies:  See list    Current Outpatient Medications   Medication Sig Dispense Refill    sucralfate (CARAFATE) 1 GM tablet Take 1 tablet by mouth 4 times daily 120 tablet 3    pantoprazole (PROTONIX) 40 MG tablet Take 1 tablet by mouth every morning (before breakfast) 30 tablet 0    amLODIPine (NORVASC) 10 MG tablet Take 1 tablet by mouth daily 90 tablet 1    aspirin EC 81 MG EC tablet Take 1 tablet by mouth daily 90 tablet 1    rosuvastatin (CRESTOR) 40 MG tablet Take 1 tablet by mouth daily 30 tablet 2    losartan (COZAAR) 50 MG tablet Take 1 tablet by mouth daily 30 tablet 2    nicotine (NICODERM CQ) 21 MG/24HR Place 1 patch onto the skin daily 30 patch 3    clopidogrel (PLAVIX) 75 MG tablet Take 1 tablet by mouth daily 21 tablet 0    fluocinolone (DERMOTIC) 0.01 % OIL oil Put 4 drops into the affected ear(s) twice daily x 2 weeks. After that can decrease use to 1-2 times a week for maintenance. 20 mL 2    Probiotic Product (PROBIOTIC-10) CAPS Take by mouth daily      famotidine (PEPCID) 20 MG tablet Take 20 mg by mouth 2 times daily      Multiple Vitamin (MULTI-VITAMIN DAILY PO) Take by mouth      Ascorbic Acid (VITAMIN C) 250 MG tablet Take 250 mg by mouth daily      Cholecalciferol (VITAMIN D3) 2000 units CAPS Take by mouth      acetaminophen (TYLENOL) 325 MG tablet Take 325 mg by mouth every 6 hours as needed for Pain      diphenhydrAMINE (BENADRYL) 25 MG tablet Take 25 mg by mouth every 6 hours as needed for Itching       No current facility-administered medications for this visit. Social History     Socioeconomic History    Marital status:       Spouse name: None    Number of children: None    Years of education: None    Highest education level: None   Occupational History    None   Social Needs    Financial resource strain: Not hard at all   [employer] insecurity     Worry: Never true     Inability: Never true   [employer] needs     Medical: None     Non-medical: None   Tobacco Use    Smoking status: Former Smoker     Packs/day: 0.50     Years: 30.00     Pack years: 15.00     Quit date: 2021     Years since quittin.0    Smokeless tobacco: Never Used   Substance and Sexual Activity    Alcohol use: No    Drug use: No    Sexual activity: automated exposure control    and/or adjustment of mA and/or kV according to patient size and/or use of    iterative reconstruction technique.           FINDINGS:        PERTINENT POSITIVES: The distal body and the pylorus of the stomach are    diffusely thickened with the wall measuring up to 1.4 cm in thickness over a 5    cm length. This could be secondary to gastritis or neoplasm.       PERTINENT NEGATIVES: No free fluid or free air. No evidence of any type of    metastatic disease. Negative for pancreatitis or cholecystitis.           COINCIDENTAL FINDINGS: Punctate nonobstructing calcification right kidney. 9 mm    cyst superior pole right kidney unchanged. Mild convex left scoliosis with    multilevel degenerative changes lumbar spine. Plaque aorta without aneurysm.       ROUTINE EXAMINATION: Lung bases clear. Normal liver, spleen, pancreas,    gallbladder, kidneys otherwise. Moderate amount stool in the colon. Normal    appendix. Abdominal wall intact.               Impression       Circumferential prominent thickening of the distal stomach and pylorus. This    could represent gastritis or neoplasm. Consider endoscopy.       The findings were discussed by telephone with Dr. Sera Alvarez at 12:10 PM.           ASSESSMENT     Diagnosis Orders   1. Epigastric pain     2. Abnormal CT of the abdomen     3. BMI less than 19,adult     4. History of CVA (cerebrovascular accident) without residual deficits       PLAN    Discussed at length with Ms. Katie Pepper the nature of her recent abdominal pain and findings of gastric thickening on recent CT abdomen. Continue proton Protonix and Carafate. [place] diet for now. We will proceed with diagnostic EGD this week. Risks, benefits, alternatives thoroughly reviewed and accepted by Ms. Bowser including GI bleeding, perforation, missed lesions, COVID-19 exposure/infection, etc.     Shameka Curiel MD

## 2021-05-12 NOTE — BRIEF OP NOTE
Brief Postoperative Note      Patient: Ronny Kern  YOB: 1960  MRN: [id number]    Date of Procedure: 5/12/2021    Pre-Op Diagnosis:      1. Epigastric pain     2. Abnormal CT scan of the abdomen, gastric thickening    Post-Op Diagnosis:      1. Type I antral ulcer, lesser curvature     2. Diffuse gastritis     3. Small hiatal hernia, Type I       Operation:     1. EGD     2. Antral biopsies     3. Ulcer margin biopsy    Surgeon(s):  Dylon Garibay MD    Assistant:  * No surgical staff found *    Anesthesia: Monitor Anesthesia Care    Estimated Blood Loss (mL): less than 80XV     Complications: None    Specimens:   ID Type Source Tests Collected by Time Destination   1 :  Tissue Stomach H. PYLORI DETECTION Dylon Garibay MD 5/12/2021 1250    A :  Tissue Stomach SURGICAL PATHOLOGY Dylon Garibay MD 5/12/2021 1251    B :  Tissue Stomach SURGICAL PATHOLOGY Dylon Garibay MD 5/12/2021 1254        Findings:  As above.     Dictated # [id number]    Electronically signed by Dylon Garibay MD on 5/12/2021 at 1:07 PM

## 2021-05-12 NOTE — TELEPHONE ENCOUNTER
Patient asking if she should still be taking her Plavix - states that they only gave her a 21 day script from the hospital - states that she did her testing which may show an ulcer - patient uses [facility] - please let her know    Health Maintenance   Topic Date Due    Hepatitis C screen  Never done    HIV screen  Never done    DTaP/Tdap/Td vaccine (1 - Tdap) Never done    Cervical cancer screen  Never done    Colon cancer screen colonoscopy  Never done    Breast cancer screen  01/24/2022    Lipid screen  04/20/2022    Potassium monitoring  05/06/2022    Creatinine monitoring  05/06/2022    Flu vaccine  Completed    Shingles Vaccine  Completed    Pneumococcal 0-64 years Vaccine  Completed    COVID-19 Vaccine  Completed    Hepatitis A vaccine  Aged Out    Hepatitis B vaccine  Aged Out    Hib vaccine  Aged Out    Meningococcal (ACWY) vaccine  Aged Out             (applicable per patient's age: Cancer Screenings, Depression Screening, Fall Risk Screening, Immunizations)    Hemoglobin A1C (%)   Date Value   04/20/2021 5.6   01/03/2020 5.3     LDL Cholesterol (mg/dL)   Date Value   04/20/2021 113     AST (U/L)   Date Value   05/06/2021 19     ALT (U/L)   Date Value   05/06/2021 16     BUN (mg/dL)   Date Value   05/06/2021 15      (goal A1C is < 7)   (goal LDL is <100) need 30-50% reduction from baseline     BP Readings from Last 3 Encounters:   05/12/21 133/74   05/06/21 110/60   04/28/21 132/66    (goal /80)      All Future Testing planned in [facility]:  Lab Frequency Next Occurrence   Basic Metabolic Panel Once 27/91/3889   Initiate PAT Protocol Once 05/25/2021   Up as tolerated PRN    Initiate Oxygen Therapy Protocol DAILY (RT)        Next Visit Date:  Future Appointments   Date Time Provider Zainab Kim   5/14/2021 10:30 AM Irena Fernandez, PT [facility]   5/17/2021 10:30 AM Debbie Milton, PTA [facility] PT [facility]   5/20/2021 10:15 AM Irena Fernandez, PT [facility] PT [facility]   6/7/2021  3:00 PM Dilia Saha MD Neuro Endo [facility]   7/29/2021 10:40 AM DO Ethan Juárez [facility]            Patient Active Problem List:     Chronic eczematous otitis externa of both ears     Essential hypertension     TIA (transient ischemic attack)     Hyponatremia     Moderate malnutrition (HCC)     Epigastric pain

## 2021-05-13 LAB
DIRECT EXAM: NEGATIVE
Lab: NORMAL
SPECIMEN DESCRIPTION: NORMAL

## 2021-05-13 RX ORDER — CLOPIDOGREL BISULFATE 75 MG/1
75 TABLET ORAL DAILY
Qty: 21 TABLET | Refills: 0 | OUTPATIENT
Start: 2021-05-13

## 2021-05-13 NOTE — TELEPHONE ENCOUNTER
Plan was to stop Plavix after the 3 weeks but continue baby aspirin indefinitely. Please let her know that I reviewed records from her EGD and I am glad that she had it done.

## 2021-05-14 ENCOUNTER — HOSPITAL ENCOUNTER (OUTPATIENT)
Dept: PHYSICAL THERAPY | Age: 61
Setting detail: THERAPIES SERIES
Discharge: HOME | End: 2021-05-14
Payer: COMMERCIAL

## 2021-05-14 LAB — SURGICAL PATHOLOGY REPORT: NORMAL

## 2021-05-14 PROCEDURE — 97110 THERAPEUTIC EXERCISES: CPT

## 2021-05-14 NOTE — PROGRESS NOTES
Phone: Tucker Cade      Fax: [phone]                            Outpatient Physical Therapy                                                                            Daily Note    Date: 2021  Patient Name: Katherin Faust        MRN: [id number]   ACCT#:  [de-identified]  : 1960  (62 y.o.)    Referring Practitioner: Dr. Brooklyn Barkley    Referral Date : 21    Diagnosis: Left rotator cuff tear  Treatment Diagnosis: Left shoulder weakness/pain    Onset Date: 21  PT Insurance Information: BCBS  Total # of Visits Approved: 20 Per Physician Order  Total # of Visits to Date: 4  No Show: 0  Canceled Appointment: 0  Plan of Care/Certification Expiration Date: 21    Pre-Treatment Pain:  4/10     Assessment  Assessment: Patient notes mild soreness due to sleeping on left side. Completed ex with good tolerance. Fair+ scapular stability. Initiated kinesiotape for GH/scap support and will monitor. Patient educated on signs of skin irritation and doffing time frames. To Dr. Brooklyn Barkley 21 with tentative RTW 21 however question ability to complete and tolerate repetitive work.   Chart Reviewed: Yes    Plan  Plan: Continue with current plan    Exercises/Modalities/Manual:  See DocFlow Sheet    Education: Kinesiotape          Goals  (Total # of Visits to Date: 4)   Short Term Goals - Time Frame for Short term goals: 6 visits  Short term goal 1: Educate on home program for left shoulder  ROM and postural strengthening ex                Long Term Goals - Time Frame for Long term goals : 16 visits  Long term goal 1: PROM WFL in all movements without discomfort  Long term goal 2: Actively reach overhead to upper cabinets  Long term goal 3: Strength left UE to carry gallon of mild  Long term goal 4: Lift up to 10# using left UE to prepare for return to work  Long term goal 5: Subjective left shoulder pain rated <2/10 with daily activities    Post Treatment Pain: 4/10    Time In: 1030    Time Out : 1115        Timed Code Treatment Minutes: 40 Minutes  Total Treatment Time: 39 Minutes    AMANDA VELIZ, PT     Date: 5/14/2021

## 2021-05-17 ENCOUNTER — HOSPITAL ENCOUNTER (OUTPATIENT)
Dept: PHYSICAL THERAPY | Age: 61
Setting detail: THERAPIES SERIES
Discharge: HOME | End: 2021-05-17
Payer: COMMERCIAL

## 2021-05-17 PROCEDURE — 97140 MANUAL THERAPY 1/> REGIONS: CPT

## 2021-05-17 PROCEDURE — 97110 THERAPEUTIC EXERCISES: CPT

## 2021-05-17 ASSESSMENT — PAIN SCALES - GENERAL: PAINLEVEL_OUTOF10: 4

## 2021-05-17 NOTE — PROGRESS NOTES
Phone: Tucker Cade      Fax: [phone]                            Outpatient Physical Therapy                                                                            Daily Note    Date: 2021  Patient Name: Kiara Daley        MRN: [id number]   ACCT#:  [de-identified]  : 1960  (62 y.o.)    Referring Practitioner: Dr. Austen De La Torre    Referral Date : 21    Diagnosis: Left rotator cuff tear  Treatment Diagnosis: Left shoulder weakness/pain    Onset Date: 21  PT Insurance Information: BCBS  Total # of Visits Approved: 20 Per Physician Order  Total # of Visits to Date: 5  No Show: 0  Canceled Appointment: 0  Plan of Care/Certification Expiration Date: 21    Pre-Treatment Pain:  4/10     Assessment  Assessment: Patient arrives with c/o pain -5/10. Pt reports not completing HEP over weekend and has not been very compliant otherwise. Not sure HEP will help. Educated on pt the impportance of completing HEP to improve with ROM and strength. Pt completed her   HEP in clinic this date with education required. Continued with taping as pt states it did cue her for porper posture. Pt concerned about returning to work. Plan to continue with current POC.   Chart Reviewed: Yes    Plan  Plan: Continue with current plan    Exercises/Modalities/Manual:  See DocFlow Sheet    Education:           Goals  (Total # of Visits to Date: 5)   Short Term Goals - Time Frame for Short term goals: 6 visits  Short term goal 1: Educate on home program for left shoulder  ROM and postural strengthening ex                Long Term Goals - Time Frame for Long term goals : 16 visits  Long term goal 1: PROM WFL in all movements without discomfort  Long term goal 2: Actively reach overhead to upper cabinets  Long term goal 3: Strength left UE to carry gallon of mild  Long term goal 4: Lift up to 10# using left UE to prepare for return to work  Long term goal 5: Subjective left shoulder pain rated <2/10 with daily activities    Post Treatment Pain:  4/10      Time In: 1032  Time Out: 1114  Timed Code Treatment Minutes: 42 Minutes  Total Treatment Time: [address] Sindy Soto, \[facility] [place] [facility]\""     Date: 5/17/2021

## 2021-05-20 ENCOUNTER — HOSPITAL ENCOUNTER (OUTPATIENT)
Dept: PHYSICAL THERAPY | Age: 61
Setting detail: THERAPIES SERIES
Discharge: HOME | End: 2021-05-20
Payer: COMMERCIAL

## 2021-05-20 PROCEDURE — 97110 THERAPEUTIC EXERCISES: CPT

## 2021-05-20 NOTE — PROGRESS NOTES
Phone: Tucker Cade      Fax: [phone]                            Outpatient Physical Therapy                                                                            Daily Note    Date: 2021  Patient Name: Lisa Mehta        MRN: [id number]   ACCT#:  [de-identified]  : 1960  (62 y.o.)    Referring Practitioner: Dr. Aminata Barbosa    Referral Date : 21    Diagnosis: Left rotator cuff tear  Treatment Diagnosis: Left shoulder weakness/pain    Onset Date: 21  PT Insurance Information: BCBS  Total # of Visits Approved: 20 Per Physician Order  Total # of Visits to Date: 6  No Show: 0  Canceled Appointment: 0  Plan of Care/Certification Expiration Date: 21    Pre-Treatment Pain:  4/10     Assessment  Assessment: Patient reports pain 4/10 but demonstrates full AROM with end range discomfort upon abduction. Limited tolerance when reaching overhead to complete light lifting. Initiated box lifts floor to waist with fair tolerance. Plan to progress with strengthening and work simulation movements/positions.   To Dr. Aminata Barbosa tomorrow however do not feel patient ready to return to work  Chart Reviewed: Yes    Plan  Plan: Continue with current plan    Exercises/Modalities/Manual:  See DocFlow Sheet    Education: Proper body mechanics with  lifting          Goals  (Total # of Visits to Date: 6)   Short Term Goals - Time Frame for Short term goals: 6 visits  Short term goal 1: Educate on home program for left shoulder  ROM and postural strengthening ex- MET                Long Term Goals - Time Frame for Long term goals : 16 visits  Long term goal 1: PROM WFL in all movements without discomfort  Long term goal 2: Actively reach overhead to upper cabinets  Long term goal 3: Strength left UE to carry gallon of mild  Long term goal 4: Lift up to 10# using left UE to prepare for return to work  Long term goal 5: Subjective left shoulder pain rated <2/10 with daily activities    Post Treatment Pain:  4/10    Time In: 1020    Time Out : 1100        Timed Code Treatment Minutes: 40 Minutes  Total Treatment Time: 36 Minutes    AMANDA VELIZ, PT     Date: 5/20/2021

## 2021-05-21 RX ORDER — AMLODIPINE BESYLATE 10 MG/1
TABLET ORAL
Qty: 90 TABLET | Refills: 1 | Status: SHIPPED | OUTPATIENT
Start: 2021-05-21 | End: 2021-07-12 | Stop reason: SDUPTHER

## 2021-05-24 ENCOUNTER — HOSPITAL ENCOUNTER (OUTPATIENT)
Dept: PHYSICAL THERAPY | Age: 61
Setting detail: THERAPIES SERIES
End: 2021-05-24
Payer: COMMERCIAL

## 2021-05-26 ENCOUNTER — HOSPITAL ENCOUNTER (OUTPATIENT)
Dept: PHYSICAL THERAPY | Age: 61
Setting detail: THERAPIES SERIES
Discharge: HOME | End: 2021-05-26
Payer: COMMERCIAL

## 2021-05-26 PROCEDURE — 97110 THERAPEUTIC EXERCISES: CPT

## 2021-05-26 NOTE — PROGRESS NOTES
Phone: Tucker Cade      Fax: [phone]                            Outpatient Physical Therapy                                                                            Daily Note    Date: 2021  Patient Name: Cee Swanson        MRN: [id number]   ACCT#:  [de-identified]  : 1960  (62 y.o.)    Referring Practitioner: Dr. Steve Melendez    Referral Date : 21    Diagnosis: Left rotator cuff tear  Treatment Diagnosis: Left shoulder weakness/pain    Onset Date: 21  PT Insurance Information: BCBS  Total # of Visits Approved: 20 Per Physician Order  Total # of Visits to Date: 7  No Show: 0  Canceled Appointment: 0  Plan of Care/Certification Expiration Date: 21    Pre-Treatment Pain:  2/10     Assessment  Assessment: Patient saw Dr. Steve Melendez who has her off work additioanl 4-6 weeks. Subjectively reports minimal discomfort this date Progressed with work simulation with reaching overhead, box lifts from floor and also reaching for parts from floor using left UE. Able to complete increased reps of overhead lifting with less pain and improved endurance.    Plan to progress work simulation with alternating movements to increase number of reps/sets to increase endurance for RTW  Chart Reviewed: Yes    Plan  Plan: Continue with current plan    Exercises/Modalities/Manual:  See DocFlow Sheet    Education:           Goals  (Total # of Visits to Date: 7)   Short Term Goals - Time Frame for Short term goals: 6 visits  Short term goal 1: Educate on home program for left shoulder  ROM and postural strengthening ex- MET                Long Term Goals - Time Frame for Long term goals : 16 visits  Long term goal 1: PROM WFL in all movements without discomfort  Long term goal 2: Actively reach overhead to upper cabinets  Long term goal 3: Strength left UE to carry gallon of mild  Long term goal 4: Lift up to 10# using left UE to prepare for return to work  Long term goal 5: Subjective left shoulder pain rated <2/10 with daily activities    Post Treatment Pain:  2/10    Time In: 1305   Time Out : 1350        Timed Code Treatment Minutes: 40 Minutes  Total Treatment Time: 36 Minutes    AMANDA VELIZ PT     Date: 5/26/2021

## 2021-05-27 ENCOUNTER — OFFICE VISIT (OUTPATIENT)
Dept: SURGERY | Age: 61
End: 2021-05-27
Payer: COMMERCIAL

## 2021-05-27 VITALS — RESPIRATION RATE: 16 BRPM | TEMPERATURE: 97.8 F | HEIGHT: 66 IN | WEIGHT: 106 LBS | BODY MASS INDEX: 17.04 KG/M2

## 2021-05-27 DIAGNOSIS — K44.9 HIATAL HERNIA: ICD-10-CM

## 2021-05-27 DIAGNOSIS — K25.7 CHRONIC ULCER OF PYLORIC ANTRUM: ICD-10-CM

## 2021-05-27 DIAGNOSIS — K29.30 CHRONIC SUPERFICIAL GASTRITIS WITHOUT BLEEDING: ICD-10-CM

## 2021-05-27 DIAGNOSIS — Z98.890 S/P ENDOSCOPY: Primary | ICD-10-CM

## 2021-05-27 DIAGNOSIS — R93.5 ABNORMAL CT OF THE ABDOMEN: ICD-10-CM

## 2021-05-27 PROCEDURE — 99212 OFFICE O/P EST SF 10 MIN: CPT | Performed by: SURGERY

## 2021-05-27 NOTE — LETTER
[address]  [address] [facility] [address]  Phone: [phone]  Fax: [phone]    Kacey Oviedo MD    May 31, 2021     DO [address]  [place] [address]    Patient: Kiara Daley   MR Number: [id number]   YOB: 1960   Date of Visit: 5/27/2021       Dear Cris Muniz:    Thank you for referring Mervat Cannon to me for evaluation/treatment. Below are the relevant portions of my assessment and plan of care. ASSESSMENT     Diagnosis Orders   1. S/P endoscopy     2. Abnormal CT of the abdomen     3. Hiatal hernia     4. Chronic ulcer of pyloric antrum     5. Chronic superficial gastritis without bleeding     6. BMI less than 19,adult         PLAN    Endoscopic findings of antral ulceration reviewed with Ms. Antonio Briseida. Epigastric symptoms are markedly improved. She has been tobacco free since April 2021. Encouraged to wean off nicotine patches. Continue Protonix. Carafate as needed. [place] diet for now. Follow-up with me in the coming months if her epigastric pain worsens, or fails to resolve. If you have questions, please do not hesitate to call me. I look forward to following Marivel Goddard along with you.     Sincerely,    MD Kacey Martinez MD

## 2021-05-31 ASSESSMENT — ENCOUNTER SYMPTOMS
BACK PAIN: 0
ABDOMINAL PAIN: 1
BLOOD IN STOOL: 0
VOMITING: 0
TROUBLE SWALLOWING: 0
SORE THROAT: 0
CHOKING: 0
COUGH: 0
NAUSEA: 0
SHORTNESS OF BREATH: 0

## 2021-06-01 NOTE — PATIENT INSTRUCTIONS
Patient Education        Gastritis: Care Instructions  Your Care Instructions     Gastritis is a sore and upset stomach. It happens when something irritates the stomach lining. Many things can cause it. These include an infection such as the flu or something you ate or drank. Medicines or a sore on the lining of the stomach (ulcer) also can cause it. Your belly may bloat and ache. You may belch, vomit, and feel sick to your stomach. You should be able to relieve the problem by taking medicine. And it may help to change your diet. If gastritis lasts, your doctor may prescribe medicine. Follow-up care is a key part of your treatment and safety. Be sure to make and go to all appointments, and call your doctor if you are having problems. It's also a good idea to know your test results and keep a list of the medicines you take. How can you care for yourself at home? · If your doctor prescribed antibiotics, take them as directed. Do not stop taking them just because you feel better. You need to take the full course of antibiotics. · Be safe with medicines. If your doctor prescribed medicine to decrease stomach acid, take it as directed. Call your doctor if you think you are having a problem with your medicine. · Do not take any other medicine, including over-the-counter pain relievers, without talking to your doctor first.  · If your doctor recommends over-the-counter medicine to reduce stomach acid, such as Pepcid AC (famotidine), Prilosec (omeprazole), or Tagamet HB (cimetidine) follow the directions on the label. · Drink plenty of fluids to prevent dehydration. Choose water and other caffeine-free clear liquids. If you have kidney, heart, or liver disease and have to limit fluids, talk with your doctor before you increase the amount of fluids you drink. · Limit how much alcohol you drink. · Avoid coffee, tea, cola drinks, chocolate, and other foods with caffeine. They increase stomach acid.   When should you call for help? Call 911 anytime you think you may need emergency care. For example, call if:    · You vomit blood or what looks like coffee grounds.     · You pass maroon or very bloody stools. Call your doctor now or seek immediate medical care if:    · You start breathing fast and have not produced urine in the last 8 hours.     · You cannot keep fluids down. Watch closely for changes in your health, and be sure to contact your doctor if:    · You do not get better as expected. Where can you learn more? Go to https://[employer].ESILLAGE. org and sign in to your [employer] account. Enter [id number] in the [employer] box to learn more about \"Gastritis: Care Instructions. \"     If you do not have an account, please click on the \"Sign Up Now\" link. Current as of: April 15, 2020               Content Version: 12.8  © 9352-5433 Healthwise, [employer]. Care instructions adapted under license by [facility] ([facility]). If you have questions about a medical condition or this instruction, always ask your healthcare professional. [place] [address] any warranty or liability for your use of this information.

## 2021-06-01 NOTE — PROGRESS NOTES
Celeste Wheeler MD  General Surgery, Endoscopy  Chief Medical Officer    [facility] Francine Mathews  [address] [place] [address]  Dept: [phone]  Fax: [phone] Chiquis Doyle  Chief Complaint   Patient presents with    Follow Up After Procedure     f/u egd 05/12/21 due to epigastric pain and abnormal CT. HPI    Ms. Rachele Pritchett returns for follow-up after endoscopy. DATE OF PROCEDURE:  05/12/2021     ENDOSCOPY REPORT     ATTENDING SURGEON:  Dr. Nikko Dumont.     PRIMARY CARE PHYSICIAN:  Bao Vieira DO     PREOPERATIVE DIAGNOSES:  1.  Epigastric pain. 2.  Abnormal CT scan of abdomen, gastric thickening.     POSTOPERATIVE DIAGNOSES:  1. Type 1 antral ulcer, lesser curvature. 2.  Diffuse gastritis, upper end.  3.  Small hiatal hernia, type 1.     PROCEDURES PERFORMED:  1. Esophagogastroduodenoscopy. 2.  Prepyloric antral biopsies. 3.  Type 1 ulcer margin biopsy of lesser curvature    Review of Systems   Constitutional: Negative for activity change, appetite change, chills, fever and unexpected weight change. HENT: Negative for nosebleeds, sneezing, sore throat and trouble swallowing. Eyes: Negative for visual disturbance. Respiratory: Negative for cough, choking and shortness of breath. Cardiovascular: Negative for chest pain, palpitations and leg swelling. Gastrointestinal: Positive for abdominal pain (epigastric). Negative for blood in stool, nausea and vomiting. Genitourinary: Negative for dysuria, flank pain and hematuria. Musculoskeletal: Positive for arthralgias. Negative for back pain, gait problem and myalgias. Allergic/Immunologic: Negative for immunocompromised state. Neurological: Negative for dizziness, seizures, syncope, weakness and headaches. Hematological: Does not bruise/bleed easily. Psychiatric/Behavioral: Negative for confusion and sleep disturbance.        Past Medical History:   Diagnosis Date    Allergic rhinitis     dust mites and nickel    Essential hypertension 1/31/2021    Transient ischemic attack 04/19/2021       Past Surgical History:   Procedure Laterality Date   [address]    2nd in [place]  05/12/2021    UPPER GASTROINTESTINAL ENDOSCOPY N/A 5/12/2021    EGD ESOPHAGOGASTRODUODENOSCOPY WITH BIOPSIES performed by Pilar Hayward MD at [address] reviewed. No pertinent family history. Allergies:  See list    Current Outpatient Medications   Medication Sig Dispense Refill    amLODIPine (NORVASC) 10 MG tablet take 1 tablet by mouth once daily 90 tablet 1    sucralfate (CARAFATE) 1 GM tablet Take 1 tablet by mouth 4 times daily 120 tablet 3    pantoprazole (PROTONIX) 40 MG tablet Take 1 tablet by mouth every morning (before breakfast) 30 tablet 0    aspirin EC 81 MG EC tablet Take 1 tablet by mouth daily 90 tablet 1    rosuvastatin (CRESTOR) 40 MG tablet Take 1 tablet by mouth daily 30 tablet 2    losartan (COZAAR) 50 MG tablet Take 1 tablet by mouth daily 30 tablet 2    nicotine (NICODERM CQ) 21 MG/24HR Place 1 patch onto the skin daily 30 patch 3    fluocinolone (DERMOTIC) 0.01 % OIL oil Put 4 drops into the affected ear(s) twice daily x 2 weeks. After that can decrease use to 1-2 times a week for maintenance. 20 mL 2    Probiotic Product (PROBIOTIC-10) CAPS Take by mouth daily      famotidine (PEPCID) 20 MG tablet Take 20 mg by mouth 2 times daily      Multiple Vitamin (MULTI-VITAMIN DAILY PO) Take by mouth      Ascorbic Acid (VITAMIN C) 250 MG tablet Take 250 mg by mouth daily      Cholecalciferol (VITAMIN D3) 2000 units CAPS Take by mouth      acetaminophen (TYLENOL) 325 MG tablet Take 325 mg by mouth every 6 hours as needed for Pain       No current facility-administered medications for this visit. Social History     Socioeconomic History    Marital status:       Spouse name: None    Number of children: None    Years of education: None    Highest education level: None   Occupational History    None   Tobacco Use    Smoking status: Former Smoker     Packs/day: 0.50     Years: 30.00     Pack years: 15.00     Quit date: 2021     Years since quittin.1    Smokeless tobacco: Never Used   Vaping Use    Vaping Use: Never used   Substance and Sexual Activity    Alcohol use: No    Drug use: No    Sexual activity: None   Other Topics Concern    None   Social History Narrative    None     Social Determinants of Health     Financial Resource Strain: Low Risk     Difficulty of Paying Living Expenses: Not hard at all   Food Insecurity: No Food Insecurity    Worried About Running Out of Food in the Last Year: Never true    Jose Armando of Food in the Last Year: Never true   Transportation Needs:     Lack of Transportation (Medical):  Lack of Transportation (Non-Medical):    Physical Activity:     Days of Exercise per Week:     Minutes of Exercise per Session:    Stress:     Feeling of Stress :    Social Connections:     Frequency of Communication with Friends and Family:     Frequency of Social Gatherings with Friends and Family:     Attends Scientologist Services:     Active Member of Clubs or Organizations:     Attends Club or Organization Meetings:     Marital Status:    Intimate Partner Violence:     Fear of Current or Ex-Partner:     Emotionally Abused:     Physically Abused:     Sexually Abused:        Temp 97.8 °F (36.6 °C) (Infrared)   Resp 16   Ht 5' 6\" (1.676 m)   Wt 106 lb (48.1 kg)   LMP  (LMP Unknown)   BMI 17.11 kg/m²      Physical Exam  Vitals and nursing note reviewed. Constitutional:       General: She is not in acute distress. Appearance: She is well-developed. HENT:      Head: Normocephalic and atraumatic. Eyes:      General: No scleral icterus. Conjunctiva/sclera: Conjunctivae normal.      Pupils: Pupils are equal, round, and reactive to light. Neck:      Trachea: No tracheal deviation. Cardiovascular:      Rate and Rhythm: Normal rate. Pulmonary:      Effort: Pulmonary effort is normal. No respiratory distress. Skin:     General: Skin is warm and dry. Neurological:      Mental Status: She is alert and oriented to person, place, and time. Psychiatric:         Behavior: Behavior normal.         Thought Content: Thought content normal.         Judgment: Judgment normal.         IMAGING/LABS    5/14/2021 10:56 AM - [facility] Incoming Lab Results From [facility]    Component Collected Lab   Surgical Pathology Report 05/12/2021  9:[address]  [address]   -- Diagnosis --   1.  ANTRAL BIOPSIES:  NORMAL PYLORIC-ANTRAL GASTRIC MUCOSA. 2.  ANTRAL ULCER MARGIN BIOPSIES:  NORMAL SUPERFICIAL GASTRIC MUCOSA. ÁNGEL Lamar   **Electronically Signed Out**         ajb/5/14/2021         Clinical Information   Pre-op Diagnosis:  EPIGASTRIC PAIN, GASTRIC THICKENING ON CT   Operative Findings:  CLOTEST (H. PYLORI DETECTION); ANTRAL BIOPSIES;   ANTRAL ULCER MARGINS BIOPSIES   Operation Performed:  EGD, ESOPHAGOGASTRODUODENOSCOPY     Source of Specimen   1: ANTRAL BIOPSIES   2: ANTRAL ULCER MARGIN BIOPSIES     Gross Description   1.  \"MARTIN NASIM, ANTRAL BIOPSIES\" Two tan-white tissue fragments   from 0.3 to 0.4 cm and are 0.8 x 0.3 x 0.2 cm in aggregate.  Entirely   1cs. 2.  \"MARTIN NASIM, ANTRAL ULCER MARGINS BIOPSIES\" Two tan-white   tissue fragments each 0.2 cm and are 0.4 x 0.2 x 0.2 cm in aggregate. Entirely 1cs.  lm tm       Microscopic Description   1, 2.  Microscopic examination performed. SURGICAL PATHOLOGY CONSULTATION         Patient Name: Alena Burks    [facility] Rec: [id number]   Path Number: [id number]     [facility]   [facility]   ANATOMIC PATHOLOGY   [address] [place] [address],  [address] [place], [address]   [phone]   Fax: [phone]    Testing Performed By    Sylvia Le Name Director Address Valid Date Range   [facility] KeriManuel Hidalgo MD [address] [place] [address] Drive [address] 08/30/17 0801-Present   Lab and Collection    Surgical Pathology - 5/13/2021      ASSESSMENT     Diagnosis Orders   1. S/P endoscopy     2. Abnormal CT of the abdomen     3. Hiatal hernia     4. Chronic ulcer of pyloric antrum     5. Chronic superficial gastritis without bleeding     6. BMI less than 19,adult         PLAN    Endoscopic findings of antral ulceration reviewed with Ms. Pepper Jensen. Epigastric symptoms are markedly improved. She has been tobacco free since April 2021. Encouraged to wean off nicotine patches. Continue Protonix. Carafate as needed. [place] diet for now. Follow-up with me in the coming months if her epigastric pain worsens, or fails to resolve.      Agata Alberto MD

## 2021-06-03 ENCOUNTER — HOSPITAL ENCOUNTER (OUTPATIENT)
Dept: PHYSICAL THERAPY | Age: 61
Setting detail: THERAPIES SERIES
Discharge: HOME | End: 2021-06-03
Payer: COMMERCIAL

## 2021-06-03 PROCEDURE — 97110 THERAPEUTIC EXERCISES: CPT

## 2021-06-03 PROCEDURE — 97140 MANUAL THERAPY 1/> REGIONS: CPT

## 2021-06-03 ASSESSMENT — PAIN SCALES - GENERAL: PAINLEVEL_OUTOF10: 4

## 2021-06-03 NOTE — PROGRESS NOTES
Phone: Tucker Cade      Fax: [phone]                            Outpatient Physical Therapy                                                                            Daily Note    Date: 6/3/2021  Patient Name: Lindsey Denson        MRN: [id number]   ACCT#:  [de-identified]  : 1960  (62 y.o.)    Referring Practitioner: Dr. Loki Mehta    Referral Date : 21    Diagnosis: Left rotator cuff tear  Treatment Diagnosis: Left shoulder weakness/pain    Onset Date: 21  PT Insurance Information: BCBS  Total # of Visits Approved: 20 Per Physician Order  Total # of Visits to Date: 8  No Show: 0  Canceled Appointment: 0  Plan of Care/Certification Expiration Date: 21    Pre-Treatment Pain:  4/10     Assessment  Assessment: Pt reports good compliance with HEP. Pt able to reach into cupboards overhead as well as able to carry a gallon of milk. Performed work conditioning as outlined. PROM to 135 deg abd.    Chart Reviewed: Yes    Plan  Plan: Continue with current plan    Exercises/Modalities/Manual:  See DocFlow Sheet          Goals  (Total # of Visits to Date: 8)   Short Term Goals - Time Frame for Short term goals: 6 visits  Short term goal 1: Educate on home program for left shoulder  ROM and postural strengthening ex- MET                Long Term Goals - Time Frame for Long term goals : 16 visits  Long term goal 1: PROM WFL in all movements without discomfort  Long term goal 2: Actively reach overhead to upper cabinets-met  Long term goal 3: Strength left UE to carry gallon of mild-met  Long term goal 4: Lift up to 10# using left UE to prepare for return to work  Long term goal 5: Subjective left shoulder pain rated <2/10 with daily activities    Post Treatment Pain:  4/10    Time In: 1330    Time Out : 1410        Timed Code Treatment Minutes: 40 Minutes  Total Treatment Time: 40 Minutes    Feliciano Robert   PTA  Date: 6/3/2021

## 2021-06-07 ENCOUNTER — TELEMEDICINE (OUTPATIENT)
Dept: NEUROLOGY | Age: 61
End: 2021-06-07
Payer: COMMERCIAL

## 2021-06-07 DIAGNOSIS — G45.9 TIA (TRANSIENT ISCHEMIC ATTACK): Primary | ICD-10-CM

## 2021-06-07 DIAGNOSIS — I67.1 CEREBRAL ANEURYSM: ICD-10-CM

## 2021-06-07 PROCEDURE — 99215 OFFICE O/P EST HI 40 MIN: CPT | Performed by: PSYCHIATRY & NEUROLOGY

## 2021-06-07 NOTE — PROGRESS NOTES
Neurocritical Care, Stroke & Neurointerventional Note    [facility] [address] [place] [address],  [address] [facility], [address] [place] [address]   P: [phone]  Endovascular Neurosurgery Consult    Pt Name: Alivia Kuo  MRN: [id number]  YOB: 1960  Date of evaluation: 6/7/2021  Primary Care Physician: Oh Cueto DO    Reason for evaluation: right basal     SUBJECTIVE:   History of Chief Complaint:    Alivia Kuo is a 62 y.o. female who presents with right basal ganglia stroke presenting with left sided weakness, she back to the pre-stroke baseline status    Irregular superiorly projecting outpouching along the medial proximal left    ICA measures approximately 0.4 x 0.3 and could represent a small    pseudoaneurysm/aneurysm. She is referred for further evaluation and management if her left ICA aneurysm        Allergies  is allergic to other. Medications  Prior to Admission medications    Medication Sig Start Date End Date Taking? Authorizing Provider   pantoprazole (PROTONIX) 40 MG tablet take 1 tablet by mouth every morning BEFORE BREAKFAST 6/4/21   Oh Cueto DO   amLODIPine (NORVASC) 10 MG tablet take 1 tablet by mouth once daily 5/21/21   Oh Cueto DO   sucralfate (CARAFATE) 1 GM tablet Take 1 tablet by mouth 4 times daily 5/10/21   Sulma Yen MD   aspirin EC 81 MG EC tablet Take 1 tablet by mouth daily 4/21/21   Kristofer Wilson MD   rosuvastatin (CRESTOR) 40 MG tablet Take 1 tablet by mouth daily 4/21/21   Kristofer Wilson MD   losartan (COZAAR) 50 MG tablet Take 1 tablet by mouth daily 4/21/21   Kristofer Wilson MD   nicotine (NICODERM CQ) 21 MG/24HR Place 1 patch onto the skin daily 4/22/21   Kristofer Wilson MD   fluocinolone (DERMOTIC) 0.01 % OIL oil Put 4 drops into the affected ear(s) twice daily x 2 weeks. After that can decrease use to 1-2 times a week for maintenance.  1/4/21   AGNES Harrell   Probiotic Product (PROBIOTIC-10) CAPS Take by mouth daily    Historical Provider, MD   famotidine (PEPCID) 20 MG tablet Take 20 mg by mouth 2 times daily    Historical Provider, MD   Multiple Vitamin (MULTI-VITAMIN DAILY PO) Take by mouth    Historical Provider, MD   Ascorbic Acid (VITAMIN C) 250 MG tablet Take 250 mg by mouth daily    Historical Provider, MD   Cholecalciferol (VITAMIN D3) 2000 units CAPS Take by mouth    Historical Provider, MD   acetaminophen (TYLENOL) 325 MG tablet Take 325 mg by mouth every 6 hours as needed for Pain    Historical Provider, MD    Scheduled Meds:  Continuous Infusions:  PRN Meds:.  Past Medical History   has a past medical history of Allergic rhinitis, Essential hypertension, and Transient ischemic attack. Past Surgical History   has a past surgical history that includes  section (); Upper gastrointestinal endoscopy (2021); and Upper gastrointestinal endoscopy (N/A, 2021). Social History   reports that she quit smoking about 7 weeks ago. She has a 15.00 pack-year smoking history. She has never used smokeless tobacco.   reports no history of alcohol use. reports no history of drug use. Family History  family history is not on file. Review of Systems:  CONSTITUTIONAL:  negative for fevers, chills, fatigue and malaise    EYES:  negative for double vision, blurred vision and photophobia     HEENT:  negative for tinnitus, epistaxis and sore throat    RESPIRATORY:  negative for cough, shortness of breath, wheezing    CARDIOVASCULAR:  negative for chest pain, palpitations, syncope, edema    GASTROINTESTINAL:  negative for nausea, vomiting    GENITOURINARY:  negative for incontinence    MUSCULOSKELETAL:  negative for neck or back pain    NEUROLOGICAL:  Negative for weakness and tingling  negative for headaches and dizziness    PSYCHIATRIC:  negative for anxiety      Review of systems otherwise negative.       OBJECTIVE:   Vitals: LMP  (LMP Unknown)      Virtual Visit   No Vital signs    BP at home 112/60 mm Hg  General appearance: Lying in bed, NAD  HEENT: Head: Normocephalic, no lesions, without obvious abnormality. Neurologic: Mental status: Alert, oriented, thought content appropriate, Alert and oriented x 3,   Language/speech: intact language, attention, knowledge  CN: II-XII intact  MOTOR: moves all 4 extremities     LABS:   No results for input(s): WBC, HGB, HCT, PLT, NA, K, CL, CO2, BUN, CREATININE, MG, PHOS, CALCIUM, PTT, INR, AST, ALT, BILITOT, BILIDIR, NITRU, COLORU, BACTERIA in the last 72 hours. Invalid input(s): PT, WBCU, RBCU, LEUKOCYTESUA  No results for input(s): ALKPHOS, ALT, AST, BILITOT, BILIDIR, LABALBU, AMYLASE, LIPASE in the last 72 hours. RADIOLOGY:   Images were personally reviewed including:        IMPRESSIONS:   Suki Aldana is a 62 y.o. female who presents with right basal ganglia stroke presenting with left sided weakness, she back to the pre-stroke baseline status    Irregular superiorly projecting outpouching along the medial proximal left    ICA measures approximately 0.4 x 0.3 and could represent a small    pseudoaneurysm/aneurysm. She is referred for further evaluation and management if her left ICA aneurysm      1. does not have any pertinent problems on file. PLANS:   1. Starting with DSA   2. The risk and benefit of the cerebral angiography was explained at length to the patient and her family, including but not limited to vessel injury, X-ray dye allergic reaction, kidney dysfunction, stroke and groin hematoma. Thank you for the interesting evaluation. Further recommendations to follow. Consultation Virtual Visit Time:  40 minutes     Discussed use, benefit, and side effects of prescribed medications. Personally reviewed imaging with patients and all questions answered. Pt voiced understanding. Patient agreed with treatment plan. Follow up as directed below.  Greater than 50% of the time was for counseling and providing answer to the patient question. The findings and the plan discussed with the patient and all her questions were answered. Thank you very much for your referral, please do not hesitate to contact me with any questions.     Jaci Hernandez MD Pager: [phone]  Stroke, [facility] Stroke [address]MD MD  Pager [phone]  Electronically signed 6/7/2021 at 4:07 PM

## 2021-06-08 ENCOUNTER — HOSPITAL ENCOUNTER (OUTPATIENT)
Dept: PHYSICAL THERAPY | Age: 61
Setting detail: THERAPIES SERIES
Discharge: HOME | End: 2021-06-08
Payer: COMMERCIAL

## 2021-06-08 PROCEDURE — 97110 THERAPEUTIC EXERCISES: CPT

## 2021-06-08 ASSESSMENT — PAIN SCALES - GENERAL: PAINLEVEL_OUTOF10: 4

## 2021-06-08 NOTE — PROGRESS NOTES
Phone: Tucker Cade      Fax: [phone]                            Outpatient Physical Therapy                                                                            Daily Note    Date: 2021  Patient Name: José Miguel Elkins        MRN: [id number]   ACCT#:  [de-identified]  : 1960  (62 y.o.)    Referring Practitioner: Dr. Dixon Severin    Referral Date : 21    Diagnosis: Left rotator cuff tear  Treatment Diagnosis: Left shoulder weakness/pain    Onset Date: 21  PT Insurance Information: BCBS  Total # of Visits Approved: 20 Per Physician Order  Total # of Visits to Date: 9  No Show: 0  Canceled Appointment: 0  Plan of Care/Certification Expiration Date: 21    Pre-Treatment Pain:  2/10     Assessment  Assessment: AROM WFL. Patient able to raise left UE to reach into cabinet and carry milk at waist height. Able to sleep on left UE. Completed lifting and reaching for work simulation with fair tolerance. Patient states she has a brain aneurysm and will be undergoing additional testing and proable surgery. Plan to continue x 2 visits with decreasing frequency and then determine need for continued PT vs HEP.   Chart Reviewed: Yes    Plan  Plan: Continue with current plan    Exercises/Modalities/Manual:  See DocFlow Sheet    Education:           Goals  (Total # of Visits to Date: 5)   Short Term Goals - Time Frame for Short term goals: 6 visits  Short term goal 1: Educate on home program for left shoulder  ROM and postural strengthening ex- MET                Long Term Goals - Time Frame for Long term goals : 16 visits  Long term goal 1: PROM WFL in all movements without discomfort  Long term goal 2: Actively reach overhead to upper cabinets-met  Long term goal 3: Strength left UE to carry gallon of mild-met  Long term goal 4: Lift up to 10# using left UE to prepare for return to work  Long term goal 5: Subjective left shoulder pain rated <2/10 with daily activities    Post Treatment Pain:  2/10    Time In: 1400    Time Out : 1440        Timed Code Treatment Minutes: 40 Minutes  Total Treatment Time: 36 Minutes    AMANDA VELIZ, PT     Date: 6/8/2021

## 2021-06-11 ENCOUNTER — HOSPITAL ENCOUNTER (OUTPATIENT)
Dept: PHYSICAL THERAPY | Age: 61
Setting detail: THERAPIES SERIES
Discharge: HOME | End: 2021-06-11
Payer: COMMERCIAL

## 2021-06-11 PROCEDURE — 97110 THERAPEUTIC EXERCISES: CPT

## 2021-06-11 ASSESSMENT — PAIN SCALES - GENERAL: PAINLEVEL_OUTOF10: 4

## 2021-06-11 NOTE — PROGRESS NOTES
Phone: Tucker Cade      Fax: [phone]                            Outpatient Physical Therapy                                                                            Daily Note    Date: 2021  Patient Name: Dixon Lewis        MRN: [id number]   ACCT#:  [de-identified]  : 1960  (62 y.o.)    Referring Practitioner: Dr. Natali Yan    Referral Date : 21    Diagnosis: Left rotator cuff tear  Treatment Diagnosis: Left shoulder weakness/pain    Onset Date: 21  PT Insurance Information: BCBS  Total # of Visits Approved: 20 Per Physician Order  Total # of Visits to Date: 10  No Show: 0  Canceled Appointment: 0  Plan of Care/Certification Expiration Date: 21    Pre-Treatment Pain:  4/10     Assessment  Assessment: Patient arrived stating pIain is the usual 3-4/10. Pt reports pain is always noticable with activity and not as low a 2/10. Increased weight lift from floor to 5# to prepare for lifting at work. Pt requires VC for proper lifting pposture. Educated pt on more Tband exercises she can complete at home to help with strengthening. zpt concerned about lifting when returning to work.   Chart Reviewed: Yes    Plan  Plan: Continue with current plan    Exercises/Modalities/Manual:  See DocFlow Sheet    Education:           Goals  (Total # of Visits to Date: 8)   Short Term Goals - Time Frame for Short term goals: 6 visits  Short term goal 1: Educate on home program for left shoulder  ROM and postural strengthening ex- MET                Long Term Goals - Time Frame for Long term goals : 16 visits  Long term goal 1: PROM WFL in all movements without discomfort  Long term goal 2: Actively reach overhead to upper cabinets-met  Long term goal 3: Strength left UE to carry gallon of mild-met  Long term goal 4: Lift up to 10# using left UE to prepare for return to work  Long term goal 5: Subjective left shoulder pain rated <2/10 with daily activities    Post Treatment Pain:  4/10      Time In: 1246  Time Out: 1324  Timed Code Treatment Minutes: 38 Minutes  Total Treatment Time: 45 Minutes    Alvin Winter PTA     Date: 6/11/2021

## 2021-06-16 ENCOUNTER — HOSPITAL ENCOUNTER (OUTPATIENT)
Dept: PHYSICAL THERAPY | Age: 61
Setting detail: THERAPIES SERIES
Discharge: HOME | End: 2021-06-16
Payer: COMMERCIAL

## 2021-06-16 PROCEDURE — 97110 THERAPEUTIC EXERCISES: CPT

## 2021-06-16 NOTE — PROGRESS NOTES
Phone: Tucker Cade      Fax: [phone]                            Outpatient Physical Therapy                                                                            Daily Note    Date: 2021  Patient Name: Mercy Vickers        MRN: [id number]   ACCT#:  [de-identified]  : 1960  (62 y.o.)    Referring Practitioner: Dr. Tanner Guzman    Referral Date : 21    Diagnosis: Left rotator cuff tear  Treatment Diagnosis: Left shoulder weakness/pain    Onset Date: 21  PT Insurance Information: BCBS  Total # of Visits Approved: 20 Per Physician Order  Total # of Visits to Date: 11  No Show: 0  Canceled Appointment: 0  Plan of Care/Certification Expiration Date: 21    Pre-Treatment Pain:  2/10     Assessment  Assessment: Patient states left shoulder pain ranges from 1-4/10 depending on activity level or position. Notes only fatigue with exercises. Full active and passive ROM. Continues to req verbal cues on postural support as well as proper lifting techniques. Plan to continue x 1 visits to review HEP and then will place on hold until  follow up on 21.   Chart Reviewed: Yes    Plan  Plan: Continue with current plan    Exercises/Modalities/Manual:  See DocFlow Sheet    Education: Issued written HEP for tband ex and bicep curls           Goals  (Total # of Visits to Date: 6)   Short Term Goals - Time Frame for Short term goals: 6 visits  Short term goal 1: Educate on home program for left shoulder  ROM and postural strengthening ex- MET                Long Term Goals - Time Frame for Long term goals : 16 visits  Long term goal 1: PROM WFL in all movements without discomfort  Long term goal 2: Actively reach overhead to upper cabinets-met  Long term goal 3: Strength left UE to carry gallon of mild-met  Long term goal 4: Lift up to 10# using left UE to prepare for return to work  Long term goal 5: Subjective left shoulder pain rated <2/10 with

## 2021-07-01 ENCOUNTER — HOSPITAL ENCOUNTER (OUTPATIENT)
Dept: INTERVENTIONAL RADIOLOGY/VASCULAR | Age: 61
Discharge: HOME | End: 2021-07-03
Payer: COMMERCIAL

## 2021-07-01 VITALS
WEIGHT: 111 LBS | HEART RATE: 83 BPM | SYSTOLIC BLOOD PRESSURE: 141 MMHG | RESPIRATION RATE: 17 BRPM | TEMPERATURE: 98.3 F | HEIGHT: 66 IN | BODY MASS INDEX: 17.84 KG/M2 | OXYGEN SATURATION: 98 % | DIASTOLIC BLOOD PRESSURE: 75 MMHG

## 2021-07-01 DIAGNOSIS — G45.9 BRAIN TIA: ICD-10-CM

## 2021-07-01 LAB
GFR NON-AFRICAN AMERICAN: 57 ML/MIN
GFR SERPL CREATININE-BSD FRML MDRD: >60 ML/MIN
GFR SERPL CREATININE-BSD FRML MDRD: ABNORMAL ML/MIN/{1.73_M2}
GLUCOSE BLD-MCNC: 98 MG/DL (ref 74–100)
POC BUN: 21 MG/DL (ref 8–26)
POC CHLORIDE: 104 MMOL/L (ref 98–107)
POC CREATININE: 0.99 MG/DL (ref 0.51–1.19)
POC HEMATOCRIT: 37 % (ref 36–46)
POC HEMOGLOBIN: 12.7 G/DL (ref 12–16)
POC POTASSIUM: 4.8 MMOL/L (ref 3.5–4.5)
POC SODIUM: 138 MMOL/L (ref 138–146)

## 2021-07-01 PROCEDURE — 7100000001 HC PACU RECOVERY - ADDTL 15 MIN

## 2021-07-01 PROCEDURE — 82435 ASSAY OF BLOOD CHLORIDE: CPT

## 2021-07-01 PROCEDURE — 84132 ASSAY OF SERUM POTASSIUM: CPT

## 2021-07-01 PROCEDURE — 84295 ASSAY OF SERUM SODIUM: CPT

## 2021-07-01 PROCEDURE — C1760 CLOSURE DEV, VASC: HCPCS

## 2021-07-01 PROCEDURE — C1894 INTRO/SHEATH, NON-LASER: HCPCS

## 2021-07-01 PROCEDURE — 36223 PLACE CATH CAROTID/INOM ART: CPT | Performed by: PSYCHIATRY & NEUROLOGY

## 2021-07-01 PROCEDURE — 84520 ASSAY OF UREA NITROGEN: CPT

## 2021-07-01 PROCEDURE — 7100000000 HC PACU RECOVERY - FIRST 15 MIN

## 2021-07-01 PROCEDURE — 6360000004 HC RX CONTRAST MEDICATION: Performed by: PSYCHIATRY & NEUROLOGY

## 2021-07-01 PROCEDURE — 82947 ASSAY GLUCOSE BLOOD QUANT: CPT

## 2021-07-01 PROCEDURE — 99226 PR SBSQ OBSERVATION CARE/DAY 35 MINUTES: CPT | Performed by: PSYCHIATRY & NEUROLOGY

## 2021-07-01 PROCEDURE — C1769 GUIDE WIRE: HCPCS

## 2021-07-01 PROCEDURE — 2580000003 HC RX 258: Performed by: PSYCHIATRY & NEUROLOGY

## 2021-07-01 PROCEDURE — 99152 MOD SED SAME PHYS/QHP 5/>YRS: CPT | Performed by: PSYCHIATRY & NEUROLOGY

## 2021-07-01 PROCEDURE — 36224 PLACE CATH CAROTD ART: CPT | Performed by: PSYCHIATRY & NEUROLOGY

## 2021-07-01 PROCEDURE — 85014 HEMATOCRIT: CPT

## 2021-07-01 PROCEDURE — 2709999900 HC NON-CHARGEABLE SUPPLY

## 2021-07-01 PROCEDURE — C1887 CATHETER, GUIDING: HCPCS

## 2021-07-01 PROCEDURE — 82565 ASSAY OF CREATININE: CPT

## 2021-07-01 RX ORDER — SODIUM CHLORIDE 9 MG/ML
INJECTION, SOLUTION INTRAVENOUS CONTINUOUS
Status: DISCONTINUED | OUTPATIENT
Start: 2021-07-01 | End: 2021-07-04 | Stop reason: HOSPADM

## 2021-07-01 RX ORDER — SODIUM CHLORIDE 0.9 % (FLUSH) 0.9 %
5-40 SYRINGE (ML) INJECTION EVERY 12 HOURS SCHEDULED
Status: DISCONTINUED | OUTPATIENT
Start: 2021-07-01 | End: 2021-07-04 | Stop reason: HOSPADM

## 2021-07-01 RX ORDER — ONDANSETRON 2 MG/ML
4 INJECTION INTRAMUSCULAR; INTRAVENOUS EVERY 6 HOURS PRN
Status: DISCONTINUED | OUTPATIENT
Start: 2021-07-01 | End: 2021-07-04 | Stop reason: HOSPADM

## 2021-07-01 RX ORDER — SODIUM CHLORIDE 0.9 % (FLUSH) 0.9 %
5-40 SYRINGE (ML) INJECTION PRN
Status: DISCONTINUED | OUTPATIENT
Start: 2021-07-01 | End: 2021-07-04 | Stop reason: HOSPADM

## 2021-07-01 RX ORDER — ONDANSETRON 4 MG/1
4 TABLET, ORALLY DISINTEGRATING ORAL EVERY 8 HOURS PRN
Status: DISCONTINUED | OUTPATIENT
Start: 2021-07-01 | End: 2021-07-04 | Stop reason: HOSPADM

## 2021-07-01 RX ORDER — SODIUM CHLORIDE 9 MG/ML
25 INJECTION, SOLUTION INTRAVENOUS PRN
Status: DISCONTINUED | OUTPATIENT
Start: 2021-07-01 | End: 2021-07-04 | Stop reason: HOSPADM

## 2021-07-01 RX ORDER — IODIXANOL 270 MG/ML
150 INJECTION, SOLUTION INTRAVASCULAR
Status: COMPLETED | OUTPATIENT
Start: 2021-07-01 | End: 2021-07-01

## 2021-07-01 RX ORDER — ACETAMINOPHEN 325 MG/1
650 TABLET ORAL EVERY 4 HOURS PRN
Status: DISCONTINUED | OUTPATIENT
Start: 2021-07-01 | End: 2021-07-04 | Stop reason: HOSPADM

## 2021-07-01 RX ADMIN — IODIXANOL 34 ML: 270 INJECTION, SOLUTION INTRAVASCULAR at 11:39

## 2021-07-01 RX ADMIN — SODIUM CHLORIDE: 9 INJECTION, SOLUTION INTRAVENOUS at 08:58

## 2021-07-01 NOTE — H&P
Endovascular Neurosurgery History and Physical      Reason for evaluation: DSA for left ICA pseudoaneurysm/aneurysm    SUBJECTIVE:   History of Chief Complaint:    Caroline Singletary is a 73-year-old female who presents as elective outpatient DSA. Past medical history significant for right basal ganglia stroke 4/19/2021 and hypertension. Patient recently followed up outpatient 6/7/2021 for evaluation and management of left ICA aneurysm. Patient noted to have right basal ganglia ischemic stroke April 19, 2021 during which her stroke work-up involved CTA head and neck demonstrating the left ICA aneurysm. Plan is for diagnostic angiogram today to better evaluate aneurysm/pseudoaneurysm. Patient neurologically intact nonfocal neuro exam full recovery from her previous right basal ganglia stroke. Of note patient does admit to trauma [facility] and 2000. Allergies  is allergic to other. Medications  Prior to Admission medications    Medication Sig Start Date End Date Taking? Authorizing Provider   pantoprazole (PROTONIX) 40 MG tablet take 1 tablet by mouth every morning BEFORE BREAKFAST 6/4/21  Yes Rupert Silvestre,    amLODIPine (NORVASC) 10 MG tablet take 1 tablet by mouth once daily 5/21/21  Yes Rupert Silvestre,    sucralfate (CARAFATE) 1 GM tablet Take 1 tablet by mouth 4 times daily 5/10/21  Yes Ana Mendoza MD   aspirin EC 81 MG EC tablet Take 1 tablet by mouth daily 4/21/21  Yes Ben Manriquez MD   rosuvastatin (CRESTOR) 40 MG tablet Take 1 tablet by mouth daily 4/21/21  Yes Ben Manriquez MD   losartan (COZAAR) 50 MG tablet Take 1 tablet by mouth daily 4/21/21  Yes Ben Manriquez MD   nicotine (NICODERM CQ) 21 MG/24HR Place 1 patch onto the skin daily 4/22/21  Yes Ben Manriquez MD   fluocinolone (DERMOTIC) 0.01 % OIL oil Put 4 drops into the affected ear(s) twice daily x 2 weeks. After that can decrease use to 1-2 times a week for maintenance.  1/4/21  Yes AGNES Harrell   Probiotic Product (PROBIOTIC-10) CAPS Take by mouth daily   Yes Historical Provider, MD   famotidine (PEPCID) 20 MG tablet Take 20 mg by mouth 2 times daily   Yes Historical Provider, MD   Multiple Vitamin (MULTI-VITAMIN DAILY PO) Take by mouth   Yes Historical Provider, MD   Ascorbic Acid (VITAMIN C) 250 MG tablet Take 250 mg by mouth daily   Yes Historical Provider, MD   Cholecalciferol (VITAMIN D3) 2000 units CAPS Take by mouth   Yes Historical Provider, MD   acetaminophen (TYLENOL) 325 MG tablet Take 325 mg by mouth every 6 hours as needed for Pain   Yes Historical Provider, MD    Scheduled Meds:   sodium chloride flush  5-40 mL Intravenous 2 times per day     Continuous Infusions:   sodium chloride 75 mL/hr at 21 0858    sodium chloride       PRN Meds:.sodium chloride flush, sodium chloride  Past Medical History   has a past medical history of Allergic rhinitis, Essential hypertension, and Transient ischemic attack. Past Surgical History   has a past surgical history that includes  section (); Upper gastrointestinal endoscopy (2021); and Upper gastrointestinal endoscopy (N/A, 2021). Social History   reports that she quit smoking about 2 months ago. She has a 15.00 pack-year smoking history. She has never used smokeless tobacco.   reports no history of alcohol use. reports no history of drug use. Family History  family history is not on file.     Review of Systems:  CONSTITUTIONAL:  negative for fevers, chills, fatigue and malaise    EYES:  negative for double vision, blurred vision and photophobia     HEENT:  negative for tinnitus, epistaxis and sore throat    RESPIRATORY:  negative for cough, shortness of breath, wheezing    CARDIOVASCULAR:  negative for chest pain, palpitations, syncope, edema    GASTROINTESTINAL:  negative for nausea, vomiting    GENITOURINARY:  negative for incontinence    MUSCULOSKELETAL:  negative for neck or back pain    NEUROLOGICAL:  Negative for weakness and tingling  negative for headaches and dizziness    PSYCHIATRIC:  negative for anxiety      Review of systems otherwise negative. OBJECTIVE:     Vitals:    21 0854   BP: 131/72   Pulse: 77   Resp: 18   Temp: 98.3 °F (36.8 °C)   SpO2: 99%        General:  Gen: normal habitus, NAD  HEENT: NCAT, mucosa moist  Cvs: RRR, S1 S2 normal  Resp: symmetric unlabored breathing  Abd: s/nd/nt  Ext: no edema  Skin: no lesions seen, warm and dry    Neuro:  Gen: awake and alert, oriented x3. Lang/speech: no aphasia or dysarthria. Follows commands. CN: PERRL, EOMI, VFF, V1-3 intact, face symmetric, hearing intact, shoulder shrug symmetric, tongue midline  Motor: grossly 5/5 UE and LE b/l  Sense: LT intact in all 4 ext. Coord: FTN and HTS intact b/l  DTR: deferred  Gait: narrow base gait    NIH Stroke Scale:   1a  Level of consciousness: 0 - alert; keenly responsive   1b. LOC questions:  0 - answers both questions correctly   1c. LOC commands: 0 - performs both tasks correctly   2. Best Gaze: 0 - normal   3. Visual: 0 - no visual loss   4. Facial Palsy: 0 - normal symmetric movement   5a. Motor left arm: 0 - no drift, limb holds 90 (or 45) degrees for full 10 seconds   5b. Motor right arm: 0 - no drift, limb holds 90 (or 45) degrees for full 10 seconds   6a. Motor left le - no drift; leg holds 30 degree position for full 5 seconds   6b  Motor right le - no drift; leg holds 30 degree position for full 5 seconds   7. Limb Ataxia: 0 - absent   8. Sensory: 0 - normal; no sensory loss   9. Best Language:  0 - no aphasia, normal   10. Dysarthria: 0 - normal   11. Extinction and Inattention: 0 - no abnormality         Total:   0     MRS: 0      LABS:   Reviewed.   Lab Results   Component Value Date    HGB 13.4 2021    WBC 8.7 2021     2021     2021    BUN 15 2021    CREATININE 0.99 2021    AST 19 2021    ALT 16 2021      Lab Results   Component Value Date [address] Not Detected 05/11/2021    COVID19 Not Detected 09/30/2020       RADIOLOGY:   Images were personally reviewed including:  CT head without 4/19/2021  Impression           1. No bleed mass or midline shift. 2. Minimal mucosal thickening left ethmoid air cells. MRI brain with and without 4/20/2021  Impression   Acute/subacute infarct at the posterior basal ganglia, lentiform    nucleus region on the right.       Report was placed into the stat call report folder to ensure physician    notification. CTA head and neck 4/21/2021  Impression   1. No large vessel arterial occlusion, high-grade narrowing, or dissection in    the head or neck.       2. Approximately 50% narrowing of the left ICA origin and proximal segment due    to mixed atherosclerotic plaque.       3. Irregular superiorly projecting outpouching along the medial proximal left    ICA measures approximately 0.4 x 0.3 and could represent a small    pseudoaneurysm/aneurysm. ASSESSMENT:   Ancelmo Chun is a 11year-old  female who presents for elective DSA for left ICA irregularity concerning for aneurysm versus pseudoaneurysm. Patient recently had stroke work-up secondary to right basal ganglia acute ischemic stroke with left ICA incidental finding. 1.  Irregular superiorly projecting outpouching medial proximal left ICA  2. History of right basal ganglia ischemic stroke  PLAN:   -DSA of left ICA irregularity  - 4/20/2021 goal less than 70 continue Crestor 40 mg nightly  -Continue aspirin 81 mg daily for secondary stroke prevention        Case discussed with Dr. Julio C Dooley and Dr. Marika Echavarria attending.     Juanis Davis MD   Stroke, [facility]  [address] [place]  Electronically signed 7/1/2021 at 10:07 AM

## 2021-07-01 NOTE — SEDATION DOCUMENTATION
Pt alert oriented after procedure. Assessment negative. Moving all extremities. Pulses marked. Rt groin site soft no hematoma drainage.  Safeguard on

## 2021-07-01 NOTE — BRIEF OP NOTE
[facility]    NEUROENDOVASCULAR SERVICE: POST-OP NOTE: 7/1/2021    Pt Name: Mainor Georges  MRN: [id number]  [place]: 1960  Date of Procedure: 7/1/2021  Primary Care Physician: Erick Agosto DO      Pre-Procedural Diagnosis: L cervical pseudoaneurysm  Post-Procedural Diagnosis: as above      Procedure Performed: cerebral angiogram    Surgeon:   Cassi Frye MD    Fellow:  Jayson Bower MD, PhD    1st Assistant:  Toshia Saha    PRE-PROCEDURAL EXAM:  Prestroke baseline mRS MODIFIED MELINA SCORE: 0 - No symptoms at all. Neurological exam performed and unchanged from initial H&P or consult    Anesthesia: IV Moderate Sedation  Complications: none    Intra-Operative EXAM:  Neurological exam performed and unchanged from initial H&P or consult    EBL: < less than 50       Cc            Specimens: Were not Obtained  Contrast:     Visipaque 270 low osmolar 34 Cc             Fluoro: 11.6 min    Findings:  Please see dictated Radiology note for further details  --L cervical pseudoaneurysm, dimensions to be reported in formal dictation, moderate athero with mild stenosis by NASCET criteria  --triplicate left accessory MCA     POST-PROCEDURAL EXAM :   Stable neurological Exam  Neurological exam performed and unchanged from initial H&P or consult    Closure:  right Vascade 5   F        POST-PROCEDURAL MONITORING : see orders  Disposition: Recovery room      Recommendations:  --Back to [facility]  --Do not bend right leg for 3 hours. --Groin checks per protocol. --Peripheral pulse checks per protocol. --SBP goal 110-140  --continue asa 81  --Follow up with Dr. Wilmer Ledesma 2 weeks after discharge and Dr. Lorena Mortensen 3 months after discharge.     Jayson Bower MD PhD fellow    Cassi Frye MD   Pager: [phone]  Stroke, [facility]  [facility] [address] [facility] [address] [facility]  Electronically signed 7/1/2021

## 2021-07-01 NOTE — SEDATION DOCUMENTATION
Post Procedure Transfer from IR Table  [x] Tubes and Lines intact     Post Procedure Neuro-Checks/NIHSS/Vitals  [x] Completed post procedure  [x] Completed bedside handoff  [x] Frequency ordered  [x] Verbal communication of frequency      Post Procedure Puncture Site Checks  [x] Completed post procedure  [x] Completed bedside handoff  [x] Frequency ordered  [x] Verbal communication of frequency    Post Procedure Pulse  Checks  [x] Completed post procedure  [x] Completed bedside handoff  [x] Frequency ordered  [x] Verbal communication of frequency    Order Set  [x] Post Neuro-Endo Procedure  [] Stroke  [] t-PA     B/P control  [x] Verbal Communication   [] Order in Care Path    Medication Review  [x] Given during procedure Versed 1mg and Fentanyl 100 mcg[] Current drips/meds/fluids    [x] Physician Notified of All changes in Assessment    Family  [x] Location Post Procedure  No family

## 2021-07-01 NOTE — PROGRESS NOTES
Air released from safeguard per policy in 10 ml. Increments every 30 minutes. No hematoma or drainage noted.

## 2021-07-01 NOTE — PROGRESS NOTES
Safeguard removed per writer. No hematoma or drainage noted to area. Band aid placed to site. HOB elevated per writer. Patient drinking soda, denies any complaints.

## 2021-07-01 NOTE — PROGRESS NOTES
Patient returned to room. Post angiogram recovery initiated. Patient awake and alert, denies any complaints. Right groin with safeguard intact. No hematoma or drainage noted. Neuro assessment unchanged and still negative.    side rails u rachelle 2, call light with in reach.

## 2021-07-12 RX ORDER — LOSARTAN POTASSIUM 50 MG/1
50 TABLET ORAL DAILY
Qty: 30 TABLET | Refills: 2 | Status: SHIPPED | OUTPATIENT
Start: 2021-07-12 | End: 2021-10-18

## 2021-07-12 RX ORDER — ROSUVASTATIN CALCIUM 40 MG/1
40 TABLET, COATED ORAL DAILY
Qty: 30 TABLET | Refills: 2 | Status: SHIPPED | OUTPATIENT
Start: 2021-07-12 | End: 2021-10-18

## 2021-07-12 RX ORDER — ASPIRIN 81 MG/1
81 TABLET ORAL DAILY
Qty: 90 TABLET | Refills: 1 | Status: SHIPPED | OUTPATIENT
Start: 2021-07-12 | End: 2021-10-18 | Stop reason: SDUPTHER

## 2021-07-12 RX ORDER — PANTOPRAZOLE SODIUM 40 MG/1
40 TABLET, DELAYED RELEASE ORAL DAILY
Qty: 30 TABLET | Refills: 5 | Status: SHIPPED | OUTPATIENT
Start: 2021-07-12 | End: 2021-10-18 | Stop reason: SDUPTHER

## 2021-07-12 RX ORDER — AMLODIPINE BESYLATE 10 MG/1
10 TABLET ORAL DAILY
Qty: 90 TABLET | Refills: 1 | Status: SHIPPED | OUTPATIENT
Start: 2021-07-12 | End: 2021-10-18 | Stop reason: SDUPTHER

## 2021-07-12 NOTE — TELEPHONE ENCOUNTER
Patient is asking for a refill on     Crestor  Cozaar  Amlodipine   Aspirin  Protonix    She states she has to use mail order pharmacy. Patient uses [facility]. Patient last seen 5/6/21. Next appt 7/23/21.     Health Maintenance   Topic Date Due    Hepatitis C screen  Never done    HIV screen  Never done    DTaP/Tdap/Td vaccine (1 - Tdap) Never done    Cervical cancer screen  Never done    Colon cancer screen colonoscopy  Never done    Flu vaccine (1) 09/01/2021    Breast cancer screen  01/24/2022    Lipid screen  04/20/2022    Potassium monitoring  07/01/2022    Creatinine monitoring  07/01/2022    Pneumococcal 0-64 years Vaccine (2 of 2 - PPSV23) 04/11/2026    Shingles Vaccine  Completed    COVID-19 Vaccine  Completed    Hepatitis A vaccine  Aged Out    Hepatitis B vaccine  Aged Out    Hib vaccine  Aged Out    Meningococcal (ACWY) vaccine  Aged Out             (applicable per patient's age: Cancer Screenings, Depression Screening, Fall Risk Screening, Immunizations)    Hemoglobin A1C (%)   Date Value   04/20/2021 5.6   01/03/2020 5.3     LDL Cholesterol (mg/dL)   Date Value   04/20/2021 113     AST (U/L)   Date Value   05/06/2021 19     ALT (U/L)   Date Value   05/06/2021 16     BUN (mg/dL)   Date Value   05/06/2021 15      (goal A1C is < 7)   (goal LDL is <100) need 30-50% reduction from baseline     BP Readings from Last 3 Encounters:   07/01/21 (!) 141/75   05/12/21 (!) 134/57   05/12/21 133/74    (goal /80)      All Future Testing planned in [facility]:  Lab Frequency Next Occurrence       Next Visit Date:  Future Appointments   Date Time Provider Zainab Kim   7/23/2021 12:30 PM Vandana Huddleston MD Neuro Endo [facility]   7/29/2021 10:40 AM DO LILA Burns [facility]   11/15/2021  1:30 PM Milana Briseno MD Neuro Endo [facility]            Patient Active Problem List:     Chronic eczematous otitis externa of both ears     Essential hypertension     TIA (transient ischemic attack)     Hyponatremia     Moderate malnutrition (HCC)     Epigastric pain

## 2021-07-23 ENCOUNTER — TELEMEDICINE (OUTPATIENT)
Dept: NEUROLOGY | Age: 61
End: 2021-07-23
Payer: COMMERCIAL

## 2021-07-23 DIAGNOSIS — I72.0 PSEUDOANEURYSM OF CAROTID ARTERY (HCC): Primary | ICD-10-CM

## 2021-07-23 PROCEDURE — 99214 OFFICE O/P EST MOD 30 MIN: CPT | Performed by: PSYCHIATRY & NEUROLOGY

## 2021-07-23 NOTE — PROGRESS NOTES
Endovascular Neurosurgery Clinic Note      Reason for evaluation: Left ICA pseudoaneurysm. SUBJECTIVE:   History of Chief Complaint:        Allergies  is allergic to other. Medications  Prior to Admission medications    Medication Sig Start Date End Date Taking? Authorizing Provider   rosuvastatin (CRESTOR) 40 MG tablet Take 1 tablet by mouth daily 7/12/21   [place] Jessica Maribel, DO   losartan (COZAAR) 50 MG tablet Take 1 tablet by mouth daily 7/12/21   [place] Jessica Maribel, DO   amLODIPine (NORVASC) 10 MG tablet Take 1 tablet by mouth daily 7/12/21   Uyen Ibarra DO   aspirin EC 81 MG EC tablet Take 1 tablet by mouth daily 7/12/21   [place] Jessica Dariowijocelyne, DO   pantoprazole (PROTONIX) 40 MG tablet Take 1 tablet by mouth daily 7/12/21   [place] June Dariowijocelyne, DO   sucralfate (CARAFATE) 1 GM tablet Take 1 tablet by mouth 4 times daily 5/10/21   Oanh Felder MD   nicotine (NICODERM CQ) 21 MG/24HR Place 1 patch onto the skin daily 4/22/21   Jozef Mays MD   fluocinolone (DERMOTIC) 0.01 % OIL oil Put 4 drops into the affected ear(s) twice daily x 2 weeks. After that can decrease use to 1-2 times a week for maintenance. 1/4/21   AGNES Harrell   Probiotic Product (PROBIOTIC-10) CAPS Take by mouth daily    Historical Provider, MD   famotidine (PEPCID) 20 MG tablet Take 20 mg by mouth 2 times daily    Historical Provider, MD   Multiple Vitamin (MULTI-VITAMIN DAILY PO) Take by mouth    Historical Provider, MD   Ascorbic Acid (VITAMIN C) 250 MG tablet Take 250 mg by mouth daily    Historical Provider, MD   Cholecalciferol (VITAMIN D3) 2000 units CAPS Take by mouth    Historical Provider, MD   acetaminophen (TYLENOL) 325 MG tablet Take 325 mg by mouth every 6 hours as needed for Pain    Historical Provider, MD    Scheduled Meds:  Continuous Infusions:  PRN Meds:.  Past Medical History   has a past medical history of Allergic rhinitis, Essential hypertension, and Transient ischemic attack.   Past Surgical History   has a past surgical history that includes  section (); Upper gastrointestinal endoscopy (2021); and Upper gastrointestinal endoscopy (N/A, 2021). Social History   reports that she quit smoking about 3 months ago. She has a 15.00 pack-year smoking history. She has never used smokeless tobacco.   reports no history of alcohol use. reports no history of drug use. Family History  family history is not on file. Review of Systems:  CONSTITUTIONAL:  negative for fevers, chills, fatigue and malaise    EYES:  negative for double vision, blurred vision and photophobia     HEENT:  negative for tinnitus, epistaxis and sore throat    RESPIRATORY:  negative for cough, shortness of breath, wheezing    CARDIOVASCULAR:  negative for chest pain, palpitations, syncope, edema    GASTROINTESTINAL:  negative for nausea, vomiting    GENITOURINARY:  negative for incontinence    MUSCULOSKELETAL:  negative for neck or back pain    NEUROLOGICAL:  Negative for weakness and tingling  negative for headaches and dizziness    PSYCHIATRIC:  negative for anxiety      Review of systems otherwise negative. OBJECTIVE:   There were no vitals filed for this visit. General:  Gen: normal habitus, NAD  HEENT: NCAT, mucosa moist  Cvs: RRR, S1 S2 normal  Resp: symmetric unlabored breathing  Abd: s/nd/nt  Ext: no edema  Skin: no lesions seen, warm and dry    Neuro:  Gen: awake and alert, oriented x3. Lang/speech: no aphasia or dysarthria. Follows commands. CN: PERRL, EOMI, VFF, V1-3 intact, face symmetric, hearing intact, shoulder shrug symmetric, tongue midline  Motor: grossly 5/5 UE and LE b/l  Sense: LT intact in all 4 ext. Coord: FTN and HTS intact b/l  DTR: deferred  Gait: narrow base gait    NIH Stroke Scale:   1a  Level of consciousness: {LEVEL OF CONSCIOUSNESS:370269309}   1b. LOC questions:  {QUESTIONS:649309258}   1c. LOC commands: {COMMANDS:484831250}   2. Best Gaze: {BEST DRGF:563332625}   3. Visual: {VISUAL:007803323}   4. Facial Palsy: {FACIAL PALSY:591664002}   5a. Motor left arm: {MOTOR-ARM:728625425}   5b. Motor right arm: {MOTOR-ARM:140598394}   6a. Motor left leg: {MOTOR-LE}   6b  Motor right leg:  {MOTOR-LE}   7. Limb Ataxia: {LIMB ATAXIA:955309861}   8. Sensory: {SENSORY:038494884}   9. Best Language:  {BEST OVAGMPFP:387233079}   10. Dysarthria: {DYSARTHRIA:626628909}   11. Extinction and Inattention: {EXTINCTION AND INATTENTION:366529667}         Total:   {0-42:36497}     MRS: ***      LABS:   Reviewed. Lab Results   Component Value Date    HGB 13.4 2021    WBC 8.7 2021     2021     2021    BUN 15 2021    CREATININE 0.99 2021    AST 19 2021    ALT 16 2021      Lab Results   Component Value Date    COVID19 Not Detected 2021    COVID19 Not Detected 2020       RADIOLOGY:   Images were personally reviewed including:      ASSESSMENT:     PLAN:       Case discussed with Dr. Zunilda Neil attending.     Troy Kirk MD, PhD  Stroke, [facility]  [facility]  Electronically signed 2021 at 1:08 PM
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
## 2021-07-29 ENCOUNTER — OFFICE VISIT (OUTPATIENT)
Dept: FAMILY MEDICINE CLINIC | Age: 61
End: 2021-07-29
Payer: COMMERCIAL

## 2021-07-29 VITALS
SYSTOLIC BLOOD PRESSURE: 120 MMHG | OXYGEN SATURATION: 99 % | BODY MASS INDEX: 18.8 KG/M2 | HEART RATE: 84 BPM | DIASTOLIC BLOOD PRESSURE: 60 MMHG | WEIGHT: 117 LBS | HEIGHT: 66 IN

## 2021-07-29 DIAGNOSIS — K59.01 SLOW TRANSIT CONSTIPATION: ICD-10-CM

## 2021-07-29 DIAGNOSIS — I72.0 PSEUDOANEURYSM OF CAROTID ARTERY (HCC): ICD-10-CM

## 2021-07-29 DIAGNOSIS — R21 RASH: ICD-10-CM

## 2021-07-29 DIAGNOSIS — I63.9 INFARCTION OF RIGHT BASAL GANGLIA (HCC): ICD-10-CM

## 2021-07-29 DIAGNOSIS — H93.12 TINNITUS OF LEFT EAR: ICD-10-CM

## 2021-07-29 DIAGNOSIS — J20.9 SUBACUTE BRONCHITIS: Primary | ICD-10-CM

## 2021-07-29 PROBLEM — E44.0 MODERATE MALNUTRITION (HCC): Status: RESOLVED | Noted: 2021-04-21 | Resolved: 2021-07-29

## 2021-07-29 PROCEDURE — 99214 OFFICE O/P EST MOD 30 MIN: CPT | Performed by: FAMILY MEDICINE

## 2021-07-29 RX ORDER — FLUTICASONE PROPIONATE 50 MCG
2 SPRAY, SUSPENSION (ML) NASAL DAILY
Qty: 1 BOTTLE | Refills: 1 | Status: SHIPPED | OUTPATIENT
Start: 2021-07-29 | End: 2022-10-24

## 2021-07-29 RX ORDER — MELOXICAM 15 MG/1
TABLET ORAL
COMMUNITY
Start: 2021-06-15 | End: 2021-12-18 | Stop reason: SINTOL

## 2021-07-29 NOTE — PATIENT INSTRUCTIONS
SURVEY:    You may be receiving a survey from [employer] regarding your visit today. You may get this in the mail, through your MyChart or in your email. Please complete the survey to enable us to provide the highest quality of care to you and your family. If you cannot score us as very good ( 5 Stars) on any question, please feel free to call the office to discuss how we could have made your experience exceptional.     Thank you.     Clinical Care Team:  DO Flor Perez, [facility] Team:  Viv Almodovar

## 2021-07-29 NOTE — PROGRESS NOTES
Name: Rajwinder Valenzuela  : 1960         Chief Complaint:     Chief Complaint   Patient presents with    Hypertension    Tinnitus    Rash     left side jaw- 2 weeks    Constipation       History of Present Illness:      Rajwinder Valenzuela is a 62 y.o.  female who presents with Hypertension, Tinnitus, Rash (left side jaw- 2 weeks), and Constipation      HPI    Cough for past week or so and then this morning a little SOB. Starting to have a little SOB with activity for past couple wks also, noticed walking back to our waiting room, mowing grass. Nonproductive cough, happens maybe every few hrs. Quit smoking in April. L ear ringing for past couple wks, louder sometimes than others. Still seems to hear ok. R one doesn't tend to ring but gets itchy. Uses steroid oil in both ears d/t eczema, prescribed by ENT. Stomach doing much better than it had been in terms of epigastric pain. Remains on protonix but stopped carafate d/t constipation. No BM for past few days. Skin lesion L jawline for about 3 wks, flaky, a little itchy. Has had similar on neck and on L posterior upper arm. Angiogram showed carotid aneurysm, will just be monitored. No recurrence of any neuro symptoms. Compliant with meds. Does smoke just a few puffs here and there. Medical History:     Patient Active Problem List   Diagnosis    Chronic eczematous otitis externa of both ears    Essential hypertension    Infarction of right basal ganglia (HCC)    Pseudoaneurysm of carotid artery (HCC)       Medications:       Prior to Admission medications    Medication Sig Start Date End Date Taking?  Authorizing Provider   meloxicam (MOBIC) 15 MG tablet take 1 tablet by mouth once daily 6/15/21  Yes Historical Provider, MD   fluticasone (FLONASE) 50 MCG/ACT nasal spray 2 sprays by Each Nostril route daily 21  Yes Miles Mcdowell DO   triamcinolone (KENALOG) 0.1 % ointment Apply topically 2 times daily 21  Yes Miles Mcdowell Ear: Tympanic membrane and ear canal normal.      Nose: Nose normal.   Eyes:      Conjunctiva/sclera: Conjunctivae normal.   Cardiovascular:      Rate and Rhythm: Normal rate and regular rhythm. Heart sounds: Normal heart sounds. Pulmonary:      Effort: Pulmonary effort is normal.      Breath sounds: Normal breath sounds. Musculoskeletal:      Cervical back: Neck supple. Lymphadenopathy:      Cervical: No cervical adenopathy. Skin:     General: Skin is warm and dry. Comments: L lower face, R lateral neck: well-demarcated slightly raised pink macule which appears irritated, small amt fine scaling present. L posterior upper arm has similarly shaped area of post-inflammatory hyperpigmentation. Neurological:      Mental Status: She is alert and oriented to person, place, and time. Psychiatric:         Judgment: Judgment normal.         Data:     Lab Results   Component Value Date     05/06/2021    K 4.3 05/06/2021     05/06/2021    CO2 28 05/06/2021    BUN 15 05/06/2021    CREATININE 0.99 07/01/2021    CREATININE 0.80 05/06/2021    GLUCOSE 97 05/06/2021    PROT 7.5 05/06/2021    LABALBU 4.3 05/06/2021    BILITOT 0.56 05/06/2021    ALKPHOS 97 05/06/2021    AST 19 05/06/2021    ALT 16 05/06/2021     Lab Results   Component Value Date    WBC 8.7 05/06/2021    RBC 4.34 05/06/2021    HGB 13.4 05/06/2021    HCT 39.0 05/06/2021    MCV 89.7 05/06/2021    MCH 31.0 05/06/2021    MCHC 34.5 05/06/2021    RDW 13.1 05/06/2021     05/06/2021    MPV NOT REPORTED 05/06/2021     Lab Results   Component Value Date    TSH 2.05 04/19/2021     Lab Results   Component Value Date    CHOL 192 04/20/2021    HDL 45 04/20/2021    LABA1C 5.6 04/20/2021         Assessment & Plan:        Diagnosis Orders   1. Subacute bronchitis     2. Tinnitus of left ear     3. Infarction of right basal ganglia (HCC)     4. Pseudoaneurysm of carotid artery ([address]     5. Rash     6. Slow transit constipation     1.  SOB and cough, no resp distress, been going on for a couple wks. Likely r/t longtime smoking, mild COPD exacerbation. Advised to try albuterol to see if it helps - I believe it will. However, if it doesn't help at all and she continues having ROOT, she will need cardiac eval. Multiple risk factors. 2. L tinnitus likely r/t some sinus congestion, again may be having respiratory infection with COPD exac. Advised flonase 2 sprays daily or 1 spray BID and advised in proper admin. Normal otic exam.   3. H/o stroke, no residual symptoms. Seeing neuro. On statin, htn meds, high-dose crestor, baby aspirin. Continue same and cont working on smoking cessation. 4. Carotid pseudoaneurysm identified during stroke workup, being monitored  5. Rash worst on face, also present on neck, looks like similar lesion cleared up on upper arm. Eczema vs psoriasis. Topical kenalog BID. 6. Inc fluid and fiber to help constipation. Requested Prescriptions     Signed Prescriptions Disp Refills    fluticasone (FLONASE) 50 MCG/ACT nasal spray 1 Bottle 1     Si sprays by Each Nostril route daily    triamcinolone (KENALOG) 0.1 % ointment 30 g 1     Sig: Apply topically 2 times daily         Patient Instructions   SURVEY:    You may be receiving a survey from [employer] regarding your visit today. You may get this in the mail, through your MyChart or in your email. Please complete the survey to enable us to provide the highest quality of care to you and your family. If you cannot score us as very good ( 5 Stars) on any question, please feel free to call the office to discuss how we could have made your experience exceptional.     Thank you.     Clinical Care Team:  DO Alphonso Esquivel, [facility] Team:  Toshia Strong received counseling on the following healthy behaviors: medication adherence  Reviewed prior labs and health maintenance. Continue current medications, diet and exercise. Discussed use, benefit, and side effects of prescribed medications. Barriers to medication compliance addressed. Patient given educational materials - see patient instructions. All patient questions answered.   Patient voiced understanding.     signed by Lane Corral DO on 8/1/2021 at 6:34 PM  [address]  Dept: [phone]

## 2021-08-01 PROBLEM — I63.9 INFARCTION OF RIGHT BASAL GANGLIA (HCC): Status: ACTIVE | Noted: 2021-08-01

## 2021-08-01 PROBLEM — G45.9 BRAIN TIA: Status: RESOLVED | Noted: 2021-04-19 | Resolved: 2021-08-01

## 2021-08-01 PROBLEM — E87.1 HYPONATREMIA: Status: RESOLVED | Noted: 2021-04-20 | Resolved: 2021-08-01

## 2021-08-01 PROBLEM — I72.0 PSEUDOANEURYSM OF CAROTID ARTERY (HCC): Status: ACTIVE | Noted: 2021-08-01

## 2021-08-01 PROBLEM — R10.13 EPIGASTRIC PAIN: Status: RESOLVED | Noted: 2021-05-12 | Resolved: 2021-08-01

## 2021-09-09 ENCOUNTER — OFFICE VISIT (OUTPATIENT)
Dept: PRIMARY CARE CLINIC | Age: 61
End: 2021-09-09
Payer: COMMERCIAL

## 2021-09-09 ENCOUNTER — HOSPITAL ENCOUNTER (OUTPATIENT)
Dept: PREADMISSION TESTING | Age: 61
Setting detail: SPECIMEN
Discharge: HOME | End: 2021-09-09
Payer: COMMERCIAL

## 2021-09-09 VITALS
OXYGEN SATURATION: 98 % | TEMPERATURE: 98.2 F | BODY MASS INDEX: 19.55 KG/M2 | HEART RATE: 95 BPM | SYSTOLIC BLOOD PRESSURE: 112 MMHG | WEIGHT: 121.1 LBS | DIASTOLIC BLOOD PRESSURE: 68 MMHG

## 2021-09-09 DIAGNOSIS — J06.9 VIRAL URI WITH COUGH: Primary | ICD-10-CM

## 2021-09-09 DIAGNOSIS — J06.9 VIRAL URI WITH COUGH: ICD-10-CM

## 2021-09-09 PROCEDURE — U0005 INFEC AGEN DETEC AMPLI PROBE: HCPCS

## 2021-09-09 PROCEDURE — C9803 HOPD COVID-19 SPEC COLLECT: HCPCS

## 2021-09-09 PROCEDURE — 99213 OFFICE O/P EST LOW 20 MIN: CPT | Performed by: NURSE PRACTITIONER

## 2021-09-09 PROCEDURE — U0003 INFECTIOUS AGENT DETECTION BY NUCLEIC ACID (DNA OR RNA); SEVERE ACUTE RESPIRATORY SYNDROME CORONAVIRUS 2 (SARS-COV-2) (CORONAVIRUS DISEASE [COVID-19]), AMPLIFIED PROBE TECHNIQUE, MAKING USE OF HIGH THROUGHPUT TECHNOLOGIES AS DESCRIBED BY CMS-2020-01-R: HCPCS

## 2021-09-09 RX ORDER — DEXTROMETHORPHAN HBR AND PYRILAMINE MALEATE 30; 30 MG/1; MG/1
1 TABLET ORAL
Qty: 21 TABLET | Refills: 0 | Status: SHIPPED | OUTPATIENT
Start: 2021-09-09 | End: 2021-09-16

## 2021-09-09 NOTE — PROGRESS NOTES
Chief Complaint   Congestion (head congestion, runny nose, cough, watery eyes x2 days, Neg Covid @ [facility] 1 week ago)      History of Present Illness   Source of history provided by: patient. Rajwinder Valenzuela is a 62 y.o. old female who has a past medical history of:   Past Medical History:   Diagnosis Date    Allergic rhinitis     dust mites and nickel    Essential hypertension 2021    Pseudoaneurysm of carotid artery ([address] 2021    Transient ischemic attack 2021    Presents to the clinic for evaluation of 2 days of head congestion, runny nose, cough and watery eyes. Denies CP, dyspnea, LE edema, abdominal pain, vomiting, rash, or lethargy. According to Maria T Spicer she has tried over-the-counter cough and cold medication with minimal results. She was given Flonase by her PCP, reports that she stopped using as she did not feel it is working. She was recently tested for COVID-19 approximately 1-1/2 weeks ago, with a self test after her significant other became ill. Vaccinated for Covid-19. ROS   Pertinent positives and negatives are stated within HPI, all other systems reviewed and are negative. Surgical History:  has a past surgical history that includes  section (); Upper gastrointestinal endoscopy (2021); and Upper gastrointestinal endoscopy (N/A, 2021). Social History:  reports that she quit smoking about 4 months ago. She has a 15.00 pack-year smoking history. She has never used smokeless tobacco. She reports that she does not drink alcohol and does not use drugs. Family History: family history is not on file. Allergies: Other    Physical Exam    VS:  /68 (Site: Right Upper Arm, Position: Sitting, Cuff Size: Medium Adult)   Pulse 95   Temp 98.2 °F (36.8 °C)   Wt 121 lb 1.6 oz (54.9 kg)   LMP  (LMP Unknown)   SpO2 98%   BMI 19.55 kg/m²    Oxygen Saturation Interpretation: Normal.    Constitutional:  Alert, development consistent with age. NAD.  Head:  NC/NT. Airway patent. Mouth: Posterior pharynx with mild erythema and clear postnasal drip. No tonsillar hypertrophy or exudate. Neck:  Normal ROM. Supple. No anterior cervical adenopathy noted. Lungs: CTAB without wheezes, rales, or rhonchi. CV:  Regular rate and rhythm, normal heart sounds, without pathological murmurs, ectopy, gallops, or rubs. Skin:  Normal turgor. Warm, dry, without visible rash. Lymphatic: No lymphangitis or adenopathy noted. Neurological:  Oriented. Motor functions intact. Lab / Imaging Results   (All laboratory and radiology results have been personally reviewed by myself)  Labs:  No results found for this visit on 09/09/21. Imaging: All Radiology results interpreted by Radiologist unless otherwise noted. No results found. Medical Decision Making   Pt non-toxic, in no apparent distress and stable at time of discharge. Assessment/Plan   Sulema Peterson was seen today for congestion. Diagnoses and all orders for this visit:    Viral URI with cough  -     Dextromethorphan-Pyrilamine (CAPRON DMT) 30-30 MG TABS; Take 1 tablet by mouth every 6-8 hours as needed (cough and congestion)  -     COVID-19; Future      62y.o. year old female presenting with concerns for Covid-19 with new onset of symptoms. Dolly Ledesma appears well, hydrated and with clear lung sounds without distress. Treating symptoms with Kelly Fan DMT, Administration Side Effects Discussed. COVID-19 outpatient order given with instructions to have done at [facility], will call with results once available. Advised cautionary self-quarantine at home in the interim. Pt should remain out of school/work until results received and be fever free for 24 hours and symptoms should be improved overall prior to returning. Increase fluids and rest. Symptomatic relief discussed including Tylenol prn pain/fever.  Schedule virtual f/u with PCP in 7-10 days if symptoms persist. ED sooner if symptoms worsen or change. Visit completed in attendance of [employer] NP student Alesia Singh RN with patient permission. Chemo Villanueva, ROGELIO - CNP    This visit was provided as a focused evaluation during the [place] -19 pandemic/national emergency. A comprehensive review of all previous patient history and testing was not conducted. Pertinent findings were elicited during the visit.

## 2021-09-09 NOTE — PATIENT INSTRUCTIONS
SURVEY:    You may be receiving a survey from [employer] regarding your visit today. Please complete the survey to enable us to provide the highest quality of care to you and your family. If you cannot score us a very good on any question, please call the office to discuss how we could of made your experience a very good one. Thank you. Patient Education        Viral Respiratory Infection: Care Instructions  Your Care Instructions     Viruses are very small organisms. They grow in number after they enter your body. There are many types that cause different illnesses, such as colds and the mumps. The symptoms of a viral respiratory infection often start quickly. They include a fever, sore throat, and runny nose. You may also just not feel well. Or you may not want to eat much. Most viral respiratory infections are not serious. They usually get better with time and self-care. Antibiotics are not used to treat a viral infection. That's because antibiotics will not help cure a viral illness. In some cases, antiviral medicine can help your body fight a serious viral infection. Follow-up care is a key part of your treatment and safety. Be sure to make and go to all appointments, and call your doctor if you are having problems. It's also a good idea to know your test results and keep a list of the medicines you take. How can you care for yourself at home? · Rest as much as possible until you feel better. · Be safe with medicines. Take your medicine exactly as prescribed. Call your doctor if you think you are having a problem with your medicine. You will get more details on the specific medicine your doctor prescribes. · Take an over-the-counter pain medicine, such as acetaminophen (Tylenol), ibuprofen (Advil, Motrin), or naproxen (Aleve), as needed for pain and fever. Read and follow all instructions on the label. Do not give aspirin to anyone younger than 20.  It has been linked to Reye syndrome, a serious illness. · Drink plenty of fluids. Hot fluids, such as tea or soup, may help relieve congestion in your nose and throat. If you have kidney, heart, or liver disease and have to limit fluids, talk with your doctor before you increase the amount of fluids you drink. · Try to clear mucus from your lungs by breathing deeply and coughing. · Gargle with warm salt water once an hour. This can help reduce swelling and throat pain. Use 1 teaspoon of salt mixed in 1 cup of warm water. · Do not smoke or allow others to smoke around you. If you need help quitting, talk to your doctor about stop-smoking programs and medicines. These can increase your chances of quitting for good. To avoid spreading the virus  · Cough or sneeze into a tissue. Then throw the tissue away. · If you don't have a tissue, use your hand to cover your cough or sneeze. Then clean your hand. You can also cough into your sleeve. · Wash your hands often. Use soap and warm water. Wash for 15 to 20 seconds each time. · If you don't have soap and water near you, you can clean your hands with alcohol wipes or gel. When should you call for help? Call your doctor now or seek immediate medical care if:    · You have a new or higher fever.     · Your fever lasts more than 48 hours.     · You have trouble breathing.     · You have a fever with a stiff neck or a severe headache.     · You are sensitive to light.     · You feel very sleepy or confused. Watch closely for changes in your health, and be sure to contact your doctor if:    · You do not get better as expected. Where can you learn more? Go to https://"[employer]OurHouse. org and sign in to your [employer] account. Enter X463 in the [employer]act[employer] box to learn more about \"Viral Respiratory Infection: Care Instructions. \"     If you do not have an account, please click on the \"Sign Up Now\" link.   Current as of: October 26, 2020               Content Version: 12.9  © 2513-6029 Healthwise, [facility]. Care instructions adapted under license by [facility] ([facility]). If you have questions about a medical condition or this instruction, always ask your healthcare professional. [address] any warranty or liability for your use of this information. Patient Education        dextromethorphan and pyrilamine  Pronunciation:  DEX troe me THOR fan and pir IL a meen  Brand:  Hudson DM, Hudson DMT  What is the most important information I should know about dextromethorphan and pyrilamine? Do not use this medicine if you have used an MAO inhibitor in the past 14 days, such as isocarboxazid, linezolid, methylene blue injection, phenelzine, rasagiline, selegiline, or tranylcypromine. Do not use this medicine only to make a child sleepy. Death can occur from the misuse of cough or cold medicines in very young children. What is dextromethorphan and pyrilamine? Dextromethorphan is a cough suppressant and pyrilamine is an antihistamine. Dextromethorphan and pyrilamine is a combination medicine used to treat cough, runny nose, sneezing, itching, and watery eyes caused by allergies, the common cold, or the flu. Dextromethorphan and pyrilamine will not treat a cough that is caused by smoking, asthma, or emphysema. Dextromethorphan and pyrilamine may also be used for purposes not listed in this medication guide. What should I discuss with my healthcare provider before taking dextromethorphan and pyrilamine? You should not use this medicine if you are allergic to dextromethorphan or pyrilamine. Do not use dextromethorphan and pyrilamine if you have used an MAO inhibitor in the past 14 days. A dangerous drug interaction could occur. MAO inhibitors include isocarboxazid, linezolid, methylene blue injection, phenelzine, rasagiline, selegiline, tranylcypromine, and others.   Ask a doctor or pharmacist if this medicine is safe to use if you have ever had:  · chronic pyrilamine? Get emergency medical help if you have signs of an allergic reaction: hives; difficult breathing; swelling of your face, lips, tongue, or throat. Stop using dextromethorphan and pyrilamine and call your doctor at once if you have:  · severe drowsiness or anxiety;  · little or no urinating; or  · a light-headed feeling, like you might pass out. Common side effects may include:  · drowsiness;  · nervousness; or  · feeling restless or excited (especially in children). This is not a complete list of side effects and others may occur. Call your doctor for medical advice about side effects. You may report side effects to FDA at 3-116-FDA-0402. What other drugs will affect dextromethorphan and pyrilamine? Ask a doctor or pharmacist before using dextromethrophan and pyrilamine with any other medicines, especially drugs that can cause drowsiness (such as opioid medication, sleep medicine, a muscle relaxer, or medicine for anxiety or seizures). Tell your doctor about all your current medicines and any medicine you start or stop using. This includes prescription and over-the-counter medicines, vitamins, and herbal products. Not all possible interactions are listed here. Where can I get more information? Your pharmacist can provide more information about dextromethorphan and pyrilamine. Remember, keep this and all other medicines out of the reach of children, never share your medicines with others, and use this medication only for the indication prescribed. Every effort has been made to ensure that the information provided by [address] is accurate, up-to-date, and complete, but no guarantee is made to that effect. Drug information contained herein may be time sensitive.  [facility] information has been compiled for use by healthcare practitioners and consumers in the [place] and therefore [employer] does not warrant that uses outside of the [place] are appropriate, unless specifically indicated otherwise. [facility] drug information does not endorse drugs, diagnose patients or recommend therapy. [facility] drug information is an informational resource designed to assist licensed healthcare practitioners in caring for their patients and/or to serve consumers viewing this service as a supplement to, and not a substitute for, the expertise, skill, knowledge and judgment of healthcare practitioners. The absence of a warning for a given drug or drug combination in no way should be construed to indicate that the drug or drug combination is safe, effective or appropriate for any given patient. [facility] does not assume any responsibility for any aspect of healthcare administered with the aid of information [facility] provides. The information contained herein is not intended to cover all possible uses, directions, precautions, warnings, drug interactions, allergic reactions, or adverse effects. If you have questions about the drugs you are taking, check with your doctor, nurse or pharmacist.  Copyright 7100-2825 [address]. Version: 1.01. Revision date: 5/11/2020. Care instructions adapted under license by [facility] ([facility]). If you have questions about a medical condition or this instruction, always ask your healthcare professional. [place] [address] any warranty or liability for your use of this information.

## 2021-09-11 LAB
SARS-COV-2: NORMAL
SARS-COV-2: NOT DETECTED
SOURCE: NORMAL

## 2021-10-18 RX ORDER — PANTOPRAZOLE SODIUM 40 MG/1
40 TABLET, DELAYED RELEASE ORAL DAILY
Qty: 30 TABLET | Refills: 5 | Status: SHIPPED | OUTPATIENT
Start: 2021-10-18 | End: 2022-04-26 | Stop reason: SDUPTHER

## 2021-10-18 RX ORDER — AMLODIPINE BESYLATE 10 MG/1
10 TABLET ORAL DAILY
Qty: 90 TABLET | Refills: 1 | Status: SHIPPED | OUTPATIENT
Start: 2021-10-18 | End: 2022-04-26 | Stop reason: SDUPTHER

## 2021-10-18 RX ORDER — ROSUVASTATIN CALCIUM 40 MG/1
40 TABLET, COATED ORAL DAILY
Qty: 90 TABLET | Refills: 3 | Status: SHIPPED | OUTPATIENT
Start: 2021-10-18 | End: 2022-01-21 | Stop reason: SDUPTHER

## 2021-10-18 RX ORDER — ASPIRIN 81 MG/1
81 TABLET ORAL DAILY
Qty: 90 TABLET | Refills: 1 | Status: SHIPPED | OUTPATIENT
Start: 2021-10-18 | End: 2022-04-26 | Stop reason: SDUPTHER

## 2021-10-18 RX ORDER — LOSARTAN POTASSIUM 50 MG/1
50 TABLET ORAL DAILY
Qty: 90 TABLET | Refills: 3 | Status: SHIPPED | OUTPATIENT
Start: 2021-10-18 | End: 2022-01-21 | Stop reason: SDUPTHER

## 2021-10-18 NOTE — TELEPHONE ENCOUNTER
Amlodipine 10 mg  protonix 40 mg  Aspirin 81 mg    Mail order to [employer].     Health Maintenance   Topic Date Due    Hepatitis C screen  Never done    HIV screen  Never done    DTaP/Tdap/Td vaccine (1 - Tdap) Never done    Cervical cancer screen  Never done    Colon cancer screen colonoscopy  Never done    Flu vaccine (1) 09/01/2021    COVID-19 Vaccine (3 - Pfizer booster) 10/13/2021    Breast cancer screen  01/24/2022    Lipid screen  04/20/2022    Potassium monitoring  07/01/2022    Creatinine monitoring  07/01/2022    Shingles Vaccine  Completed    Hepatitis A vaccine  Aged Out    Hepatitis B vaccine  Aged Out    Hib vaccine  Aged Out    Meningococcal (ACWY) vaccine  Aged Out    Pneumococcal 0-64 years Vaccine  Aged Out             (applicable per patient's age: Cancer Screenings, Depression Screening, Fall Risk Screening, Immunizations)    Hemoglobin A1C (%)   Date Value   04/20/2021 5.6   01/03/2020 5.3     LDL Cholesterol (mg/dL)   Date Value   04/20/2021 113     AST (U/L)   Date Value   05/06/2021 19     ALT (U/L)   Date Value   05/06/2021 16     BUN (mg/dL)   Date Value   05/06/2021 15      (goal A1C is < 7)   (goal LDL is <100) need 30-50% reduction from baseline     BP Readings from Last 3 Encounters:   09/09/21 112/68   07/29/21 120/60   07/01/21 (!) 141/75    (goal /80)      All Future Testing planned in [facility]:  Lab Frequency Next Occurrence   CTA HEAD NECK W CONTRAST Once 10/23/2021       Next Visit Date:  Future Appointments   Date Time Provider Zainab Kim   11/15/2021  1:30 PM Rosalio Kidd MD Neuro Endo [facility]            Patient Active Problem List:     Chronic eczematous otitis externa of both ears     Essential hypertension     Infarction of right basal ganglia (HCC)     Pseudoaneurysm of carotid artery ([address]
daily    Historical Provider, MD   Cholecalciferol (VITAMIN D3) 2000 units CAPS Take by mouth    Historical Provider, MD   acetaminophen (TYLENOL) 325 MG tablet Take 325 mg by mouth every 6 hours as needed for Pain    Historical Provider, MD

## 2021-10-18 NOTE — TELEPHONE ENCOUNTER
Last OV: 7/29/2021  Last RX: 07/12/21   Next scheduled apt: Visit date not found    Medication pending.

## 2021-11-12 ENCOUNTER — HOSPITAL ENCOUNTER (OUTPATIENT)
Dept: CT IMAGING | Age: 61
Discharge: HOME | End: 2021-11-14
Payer: COMMERCIAL

## 2021-11-12 DIAGNOSIS — I72.9 PSEUDOANEURYSM (HCC): Primary | ICD-10-CM

## 2021-11-12 DIAGNOSIS — I72.0 PSEUDOANEURYSM OF CAROTID ARTERY (HCC): ICD-10-CM

## 2021-11-12 DIAGNOSIS — I72.9 PSEUDOANEURYSM (HCC): ICD-10-CM

## 2021-11-12 LAB
BUN BLDV-MCNC: 20 MG/DL (ref 8–23)
CREAT SERPL-MCNC: 0.97 MG/DL (ref 0.5–0.9)
GFR AFRICAN AMERICAN: >60 ML/MIN
GFR NON-AFRICAN AMERICAN: 59 ML/MIN
GFR SERPL CREATININE-BSD FRML MDRD: ABNORMAL ML/MIN/{1.73_M2}
GFR SERPL CREATININE-BSD FRML MDRD: ABNORMAL ML/MIN/{1.73_M2}

## 2021-11-12 PROCEDURE — 70498 CT ANGIOGRAPHY NECK: CPT

## 2021-11-12 PROCEDURE — 82565 ASSAY OF CREATININE: CPT

## 2021-11-12 PROCEDURE — 6360000004 HC RX CONTRAST MEDICATION: Performed by: STUDENT IN AN ORGANIZED HEALTH CARE EDUCATION/TRAINING PROGRAM

## 2021-11-12 PROCEDURE — 84520 ASSAY OF UREA NITROGEN: CPT

## 2021-11-12 PROCEDURE — 36415 COLL VENOUS BLD VENIPUNCTURE: CPT

## 2021-11-12 RX ADMIN — IOPAMIDOL 100 ML: 755 INJECTION, SOLUTION INTRAVENOUS at 11:58

## 2021-11-23 ENCOUNTER — TELEMEDICINE (OUTPATIENT)
Dept: NEUROLOGY | Age: 61
End: 2021-11-23
Payer: COMMERCIAL

## 2021-11-23 DIAGNOSIS — I72.0 PSEUDOANEURYSM OF CAROTID ARTERY (HCC): Primary | ICD-10-CM

## 2021-11-23 PROCEDURE — 99214 OFFICE O/P EST MOD 30 MIN: CPT | Performed by: PSYCHIATRY & NEUROLOGY

## 2021-11-23 RX ORDER — CLOPIDOGREL BISULFATE 75 MG/1
75 TABLET ORAL DAILY
Qty: 30 TABLET | Refills: 1 | Status: SHIPPED | OUTPATIENT
Start: 2021-11-23 | End: 2021-12-17 | Stop reason: SDUPTHER

## 2021-11-23 NOTE — PROGRESS NOTES
Neurocritical Care, Stroke & Neurointerventional Note    [facility] [address] [place] [address],  [address], [address] [place] [address]   P: [phone]  Endovascular Neurosurgery Consult    Pt Name: Shiloh Romero  MRN: [id number]  YOB: 1960  Date of evaluation: 11/23/2021  Primary Care Physician: Tex Lucero DO    Reason for evaluation: right basal     SUBJECTIVE:   History of Chief Complaint:    Shiloh Romero is a 62 y.o. female who presents with right basal ganglia stroke presenting with left sided weakness, she back to the pre-stroke baseline status    Irregular superiorly projecting outpouching along the medial proximal left    ICA measures approximately 0.4 x 0.3 and could represent a small    pseudoaneurysm/aneurysm. She is referred for further evaluation and management if her left ICA aneurysm      Left shoulder bursitis  Ringing in the ear for the last few months      Allergies  is allergic to other. Medications  Prior to Admission medications    Medication Sig Start Date End Date Taking?  Authorizing Provider   rosuvastatin (CRESTOR) 40 MG tablet TAKE 1 TABLET BY MOUTH  DAILY 10/18/21   Tex Pipe, DO   losartan (COZAAR) 50 MG tablet TAKE 1 TABLET BY MOUTH  DAILY 10/18/21   Tex Pipe, DO   amLODIPine (NORVASC) 10 MG tablet Take 1 tablet by mouth daily 10/18/21   Tex Pipe, DO   pantoprazole (PROTONIX) 40 MG tablet Take 1 tablet by mouth daily 10/18/21   Tex Pipe, DO   aspirin EC 81 MG EC tablet Take 1 tablet by mouth daily 10/18/21   Tex Pipe, DO   meloxicam (MOBIC) 15 MG tablet take 1 tablet by mouth once daily  Patient not taking: Reported on 9/9/2021 6/15/21   Historical Provider, MD   fluticasone (FLONASE) 50 MCG/ACT nasal spray 2 sprays by Each Nostril route daily  Patient not taking: Reported on 9/9/2021 7/29/21   Tex Lucero,    triamcinolone (KENALOG) 0.1 % ointment Apply topically 2 times daily 7/29/21   Mary Anne JUAREZ Randol Boeck, DO   sucralfate (CARAFATE) 1 GM tablet Take 1 tablet by mouth 4 times daily 5/10/21   Mike Smith MD   nicotine (NICODERM CQ) 21 MG/24HR Place 1 patch onto the skin daily 21   Clarissa Sherwood MD   fluocinolone (DERMOTIC) 0.01 % OIL oil Put 4 drops into the affected ear(s) twice daily x 2 weeks. After that can decrease use to 1-2 times a week for maintenance. Patient not taking: Reported on 2021   AGNES Harrell   Probiotic Product (PROBIOTIC-10) CAPS Take by mouth daily    Historical Provider, MD   famotidine (PEPCID) 20 MG tablet Take 20 mg by mouth 2 times daily    Historical Provider, MD   Multiple Vitamin (MULTI-VITAMIN DAILY PO) Take by mouth    Historical Provider, MD   Ascorbic Acid (VITAMIN C) 250 MG tablet Take 250 mg by mouth daily    Historical Provider, MD   Cholecalciferol (VITAMIN D3) 2000 units CAPS Take by mouth    Historical Provider, MD   acetaminophen (TYLENOL) 325 MG tablet Take 325 mg by mouth every 6 hours as needed for Pain    Historical Provider, MD    Scheduled Meds:  Continuous Infusions:  PRN Meds:.  Past Medical History   has a past medical history of Allergic rhinitis, Essential hypertension, Pseudoaneurysm of carotid artery ([address] and Transient ischemic attack. Past Surgical History   has a past surgical history that includes  section (); Upper gastrointestinal endoscopy (2021); and Upper gastrointestinal endoscopy (N/A, 2021). Social History   reports that she quit smoking about 7 months ago. She has a 15.00 pack-year smoking history. She has never used smokeless tobacco.   reports no history of alcohol use. reports no history of drug use. Family History  family history is not on file.     Review of Systems:  CONSTITUTIONAL:  negative for fevers, chills, fatigue and malaise    EYES:  negative for double vision, blurred vision and photophobia     HEENT:  negative for tinnitus, epistaxis and sore throat    RESPIRATORY: negative for cough, shortness of breath, wheezing    CARDIOVASCULAR:  negative for chest pain, palpitations, syncope, edema    GASTROINTESTINAL:  negative for nausea, vomiting    GENITOURINARY:  negative for incontinence    MUSCULOSKELETAL:  negative for neck or back pain    NEUROLOGICAL:  Negative for weakness and tingling  negative for headaches and dizziness    PSYCHIATRIC:  negative for anxiety      Review of systems otherwise negative. OBJECTIVE:   Vitals: LMP  (LMP Unknown)      Virtual Visit   No Vital signs    BP at home beloiw 130/80 mm Hg  General appearance: Lying in bed, NAD  HEENT: Head: Normocephalic, no lesions, without obvious abnormality. Neurologic: Mental status: Alert, oriented, thought content appropriate, Alert and oriented x 3,   Language/speech: intact language, attention, knowledge  CN: II-XII intact  MOTOR: moves all 4 extremities     LABS:   No results for input(s): WBC, HGB, HCT, PLT, NA, K, CL, CO2, BUN, CREATININE, MG, PHOS, CALCIUM, PTT, INR, AST, ALT, BILITOT, BILIDIR, NITRU, COLORU, BACTERIA in the last 72 hours. Invalid input(s): PT, WBCU, RBCU, LEUKOCYTESUA  No results for input(s): ALKPHOS, ALT, AST, BILITOT, BILIDIR, LABALBU, AMYLASE, LIPASE in the last 72 hours. RADIOLOGY:   Images were personally reviewed including:        IMPRESSIONS:   Sivakumar Blanc is a 62 y.o. female who presents with right basal ganglia stroke presenting with left sided weakness, she back to the pre-stroke baseline status    Irregular superiorly projecting outpouching along the medial proximal left    ICA measures approximately 0.4 x 0.3 and could represent a small    pseudoaneurysm/aneurysm. She is referred for further evaluation and management if her left ICA aneurysm  Very ulcerated plaque at risk of embolization. does not have any pertinent problems on file.   PLANS:   Would recommend carotid stenting for this complex lesion at the bulb   Wall stent  Add Plavix  12/9/2021 for carotid stenting at 1pm      Thank you for the interesting evaluation. Further recommendations to follow. Consultation Virtual Visit Time:  40 minutes     Discussed use, benefit, and side effects of prescribed medications. Personally reviewed imaging with patients and all questions answered. Pt voiced understanding. Patient agreed with treatment plan. Follow up as directed below. Greater than 50% of the time was for counseling and providing answer to the patient question. The findings and the plan discussed with the patient and all her questions were answered. Thank you very much for your referral, please do not hesitate to contact me with any questions.     Dominique Garibay MD Pager: [phone]  [facility] [address], [place]IL[place]  Pager [phone]  Electronically signed 11/23/2021 at 2:56 PM

## 2021-12-08 NOTE — PROGRESS NOTES
Instructed on NPO. No mint, gum or cigarettes. Leave all personal belongs and jewelry at home. Wear comfortable clothing the day of surgery. May shower before surgery with antibacterial soap. Someone must remain with patient for 24 hours following surgery. Verbalized understanding. Patient to bring in medications the day of surgery. Patient states she is currently on aspirin and plavix per doctor instructions.

## 2021-12-09 ENCOUNTER — ANESTHESIA EVENT (OUTPATIENT)
Dept: INTERVENTIONAL RADIOLOGY/VASCULAR | Age: 61
DRG: 030 | End: 2021-12-09
Payer: MEDICAID

## 2021-12-09 ENCOUNTER — HOSPITAL ENCOUNTER (INPATIENT)
Dept: INTERVENTIONAL RADIOLOGY/VASCULAR | Age: 61
LOS: 2 days | Discharge: HOME | DRG: 030 | End: 2021-12-11
Attending: PSYCHIATRY & NEUROLOGY | Admitting: PSYCHIATRY & NEUROLOGY
Payer: MEDICAID

## 2021-12-09 ENCOUNTER — ANESTHESIA (OUTPATIENT)
Dept: INTERVENTIONAL RADIOLOGY/VASCULAR | Age: 61
DRG: 030 | End: 2021-12-09
Payer: MEDICAID

## 2021-12-09 VITALS — SYSTOLIC BLOOD PRESSURE: 106 MMHG | DIASTOLIC BLOOD PRESSURE: 61 MMHG | OXYGEN SATURATION: 100 %

## 2021-12-09 DIAGNOSIS — I72.9 PSEUDOANEURYSM (HCC): ICD-10-CM

## 2021-12-09 DIAGNOSIS — I66.9 CEREBRAL ARTERIAL EMBOLISM: ICD-10-CM

## 2021-12-09 LAB
ABO/RH: NORMAL
ACTIVATED CLOTTING TIME: 122 SEC (ref 79–149)
ACTIVATED CLOTTING TIME: 243 SEC (ref 79–149)
ACTIVATED CLOTTING TIME: 247 SEC (ref 79–149)
ALLEN TEST: NORMAL
ANION GAP: 10 MMOL/L (ref 7–16)
ANTIBODY SCREEN: NEGATIVE
ARM BAND NUMBER: NORMAL
COLLAGEN ADENOSINE-5'-DIPHOSPHATE (ADP) TIME: >294 SEC (ref 67–112)
COLLAGEN EPINEPHRINE TIME: >300 SEC (ref 85–172)
EXPIRATION DATE: NORMAL
FIO2: NORMAL
GFR NON-AFRICAN AMERICAN: >60 ML/MIN
GFR SERPL CREATININE-BSD FRML MDRD: >60 ML/MIN
GFR SERPL CREATININE-BSD FRML MDRD: NORMAL ML/MIN/{1.73_M2}
GLUCOSE BLD-MCNC: 110 MG/DL (ref 74–100)
MODE: NORMAL
NEGATIVE BASE EXCESS, ART: NORMAL (ref 0–2)
O2 DEVICE/FLOW/%: NORMAL
PATIENT TEMP: NORMAL
PLATELET FUNCTION INTERP: ABNORMAL
POC BUN: 15 MG/DL (ref 8–26)
POC CHLORIDE: 105 MMOL/L (ref 98–107)
POC CREATININE: 0.89 MG/DL (ref 0.51–1.19)
POC HCO3: 25.2 MMOL/L (ref 21–28)
POC HEMATOCRIT: 35 % (ref 36–46)
POC HEMOGLOBIN: 11.8 G/DL (ref 12–16)
POC IONIZED CALCIUM: 1.23 MMOL/L (ref 1.15–1.33)
POC LACTIC ACID: 0.45 MMOL/L (ref 0.56–1.39)
POC O2 SATURATION: 97 % (ref 94–98)
POC PCO2 TEMP: NORMAL MM HG
POC PCO2: 39.3 MM HG (ref 35–48)
POC PH TEMP: NORMAL
POC PH: 7.42 (ref 7.35–7.45)
POC PO2 TEMP: NORMAL MM HG
POC PO2: 87.8 MM HG (ref 83–108)
POC POTASSIUM: 4.1 MMOL/L (ref 3.5–4.5)
POC POTASSIUM: 4.7 MMOL/L (ref 3.5–4.5)
POC SODIUM: 140 MMOL/L (ref 138–146)
POC TCO2: 26 MMOL/L (ref 22–30)
POSITIVE BASE EXCESS, ART: 1 (ref 0–3)
SAMPLE SITE: NORMAL
SARS-COV-2, RAPID: NOT DETECTED
SPECIMEN DESCRIPTION: NORMAL
TCO2 (CALC), ART: NORMAL MMOL/L (ref 22–29)

## 2021-12-09 PROCEDURE — 6360000004 HC RX CONTRAST MEDICATION: Performed by: PSYCHIATRY & NEUROLOGY

## 2021-12-09 PROCEDURE — 86850 RBC ANTIBODY SCREEN: CPT

## 2021-12-09 PROCEDURE — 2000000003 HC NEURO ICU R&B

## 2021-12-09 PROCEDURE — 2580000003 HC RX 258: Performed by: ANESTHESIOLOGY

## 2021-12-09 PROCEDURE — 6360000002 HC RX W HCPCS: Performed by: NURSE ANESTHETIST, CERTIFIED REGISTERED

## 2021-12-09 PROCEDURE — 87635 SARS-COV-2 COVID-19 AMP PRB: CPT

## 2021-12-09 PROCEDURE — 83605 ASSAY OF LACTIC ACID: CPT

## 2021-12-09 PROCEDURE — 6370000000 HC RX 637 (ALT 250 FOR IP): Performed by: STUDENT IN AN ORGANIZED HEALTH CARE EDUCATION/TRAINING PROGRAM

## 2021-12-09 PROCEDURE — C1876 STENT, NON-COA/NON-COV W/DEL: HCPCS

## 2021-12-09 PROCEDURE — C1884 EMBOLIZATION PROTECT SYST: HCPCS

## 2021-12-09 PROCEDURE — 84520 ASSAY OF UREA NITROGEN: CPT

## 2021-12-09 PROCEDURE — 85576 BLOOD PLATELET AGGREGATION: CPT

## 2021-12-09 PROCEDURE — 037L3DZ DILATION OF LEFT INTERNAL CAROTID ARTERY WITH INTRALUMINAL DEVICE, PERCUTANEOUS APPROACH: ICD-10-PCS | Performed by: PSYCHIATRY & NEUROLOGY

## 2021-12-09 PROCEDURE — 2580000003 HC RX 258: Performed by: NURSE PRACTITIONER

## 2021-12-09 PROCEDURE — 37215 TRANSCATH STENT CCA W/EPS: CPT | Performed by: PSYCHIATRY & NEUROLOGY

## 2021-12-09 PROCEDURE — 85347 COAGULATION TIME ACTIVATED: CPT

## 2021-12-09 PROCEDURE — 82803 BLOOD GASES ANY COMBINATION: CPT

## 2021-12-09 PROCEDURE — 85014 HEMATOCRIT: CPT

## 2021-12-09 PROCEDURE — 80051 ELECTROLYTE PANEL: CPT

## 2021-12-09 PROCEDURE — 84132 ASSAY OF SERUM POTASSIUM: CPT

## 2021-12-09 PROCEDURE — 82565 ASSAY OF CREATININE: CPT

## 2021-12-09 PROCEDURE — 2500000003 HC RX 250 WO HCPCS: Performed by: NURSE ANESTHETIST, CERTIFIED REGISTERED

## 2021-12-09 PROCEDURE — 86900 BLOOD TYPING SEROLOGIC ABO: CPT

## 2021-12-09 PROCEDURE — 3700000001 HC ADD 15 MINUTES (ANESTHESIA)

## 2021-12-09 PROCEDURE — 82947 ASSAY GLUCOSE BLOOD QUANT: CPT

## 2021-12-09 PROCEDURE — 2709999900 HC NON-CHARGEABLE SUPPLY

## 2021-12-09 PROCEDURE — C1725 CATH, TRANSLUMIN NON-LASER: HCPCS

## 2021-12-09 PROCEDURE — C1769 GUIDE WIRE: HCPCS

## 2021-12-09 PROCEDURE — 82330 ASSAY OF CALCIUM: CPT

## 2021-12-09 PROCEDURE — 36620 INSERTION CATHETER ARTERY: CPT | Performed by: ANESTHESIOLOGY

## 2021-12-09 PROCEDURE — C1760 CLOSURE DEV, VASC: HCPCS

## 2021-12-09 PROCEDURE — 3700000000 HC ANESTHESIA ATTENDED CARE

## 2021-12-09 PROCEDURE — C1894 INTRO/SHEATH, NON-LASER: HCPCS

## 2021-12-09 PROCEDURE — 86901 BLOOD TYPING SEROLOGIC RH(D): CPT

## 2021-12-09 RX ORDER — SODIUM CHLORIDE 9 MG/ML
INJECTION, SOLUTION INTRAVENOUS CONTINUOUS
Status: DISCONTINUED | OUTPATIENT
Start: 2021-12-09 | End: 2021-12-10

## 2021-12-09 RX ORDER — HYDROCODONE BITARTRATE AND ACETAMINOPHEN 5; 325 MG/1; MG/1
1 TABLET ORAL
Status: DISCONTINUED | OUTPATIENT
Start: 2021-12-09 | End: 2021-12-09

## 2021-12-09 RX ORDER — CLOPIDOGREL BISULFATE 75 MG/1
75 TABLET ORAL DAILY
Status: DISCONTINUED | OUTPATIENT
Start: 2021-12-10 | End: 2021-12-11 | Stop reason: HOSPADM

## 2021-12-09 RX ORDER — FENTANYL CITRATE 50 UG/ML
INJECTION, SOLUTION INTRAMUSCULAR; INTRAVENOUS PRN
Status: DISCONTINUED | OUTPATIENT
Start: 2021-12-09 | End: 2021-12-09 | Stop reason: SDUPTHER

## 2021-12-09 RX ORDER — ESMOLOL HYDROCHLORIDE 10 MG/ML
INJECTION INTRAVENOUS PRN
Status: DISCONTINUED | OUTPATIENT
Start: 2021-12-09 | End: 2021-12-09 | Stop reason: SDUPTHER

## 2021-12-09 RX ORDER — DIPHENHYDRAMINE HYDROCHLORIDE 50 MG/ML
12.5 INJECTION INTRAMUSCULAR; INTRAVENOUS
Status: DISCONTINUED | OUTPATIENT
Start: 2021-12-09 | End: 2021-12-09

## 2021-12-09 RX ORDER — NICARDIPINE HYDROCHLORIDE 0.1 MG/ML
INJECTION INTRAVENOUS CONTINUOUS PRN
Status: DISCONTINUED | OUTPATIENT
Start: 2021-12-09 | End: 2021-12-09 | Stop reason: SDUPTHER

## 2021-12-09 RX ORDER — ACETAMINOPHEN 325 MG/1
650 TABLET ORAL EVERY 4 HOURS PRN
Status: DISCONTINUED | OUTPATIENT
Start: 2021-12-09 | End: 2021-12-11 | Stop reason: HOSPADM

## 2021-12-09 RX ORDER — IODIXANOL 270 MG/ML
200 INJECTION, SOLUTION INTRAVASCULAR
Status: COMPLETED | OUTPATIENT
Start: 2021-12-09 | End: 2021-12-09

## 2021-12-09 RX ORDER — AMLODIPINE BESYLATE 10 MG/1
10 TABLET ORAL DAILY
Status: DISCONTINUED | OUTPATIENT
Start: 2021-12-10 | End: 2021-12-11 | Stop reason: HOSPADM

## 2021-12-09 RX ORDER — PROMETHAZINE HYDROCHLORIDE 25 MG/ML
6.25 INJECTION, SOLUTION INTRAMUSCULAR; INTRAVENOUS
Status: DISCONTINUED | OUTPATIENT
Start: 2021-12-09 | End: 2021-12-09

## 2021-12-09 RX ORDER — SODIUM CHLORIDE, SODIUM LACTATE, POTASSIUM CHLORIDE, CALCIUM CHLORIDE 600; 310; 30; 20 MG/100ML; MG/100ML; MG/100ML; MG/100ML
INJECTION, SOLUTION INTRAVENOUS CONTINUOUS
Status: DISCONTINUED | OUTPATIENT
Start: 2021-12-09 | End: 2021-12-09

## 2021-12-09 RX ORDER — ROSUVASTATIN CALCIUM 20 MG/1
40 TABLET, COATED ORAL NIGHTLY
Status: DISCONTINUED | OUTPATIENT
Start: 2021-12-09 | End: 2021-12-11 | Stop reason: HOSPADM

## 2021-12-09 RX ORDER — METOCLOPRAMIDE HYDROCHLORIDE 5 MG/ML
10 INJECTION INTRAMUSCULAR; INTRAVENOUS
Status: DISPENSED | OUTPATIENT
Start: 2021-12-09 | End: 2021-12-09

## 2021-12-09 RX ORDER — PANTOPRAZOLE SODIUM 40 MG/1
40 TABLET, DELAYED RELEASE ORAL
Status: DISCONTINUED | OUTPATIENT
Start: 2021-12-10 | End: 2021-12-11 | Stop reason: HOSPADM

## 2021-12-09 RX ORDER — MIDAZOLAM HYDROCHLORIDE 1 MG/ML
INJECTION INTRAMUSCULAR; INTRAVENOUS PRN
Status: DISCONTINUED | OUTPATIENT
Start: 2021-12-09 | End: 2021-12-09 | Stop reason: SDUPTHER

## 2021-12-09 RX ORDER — MEPERIDINE HYDROCHLORIDE 50 MG/ML
12.5 INJECTION INTRAMUSCULAR; INTRAVENOUS; SUBCUTANEOUS EVERY 5 MIN PRN
Status: DISCONTINUED | OUTPATIENT
Start: 2021-12-09 | End: 2021-12-09

## 2021-12-09 RX ORDER — LABETALOL HYDROCHLORIDE 5 MG/ML
10 INJECTION, SOLUTION INTRAVENOUS EVERY 4 HOURS PRN
Status: DISCONTINUED | OUTPATIENT
Start: 2021-12-09 | End: 2021-12-11 | Stop reason: HOSPADM

## 2021-12-09 RX ORDER — ASPIRIN 81 MG/1
81 TABLET ORAL DAILY
Status: DISCONTINUED | OUTPATIENT
Start: 2021-12-10 | End: 2021-12-11 | Stop reason: HOSPADM

## 2021-12-09 RX ORDER — LOSARTAN POTASSIUM 50 MG/1
50 TABLET ORAL DAILY
Status: DISCONTINUED | OUTPATIENT
Start: 2021-12-10 | End: 2021-12-11 | Stop reason: HOSPADM

## 2021-12-09 RX ORDER — HEPARIN SODIUM 1000 [USP'U]/ML
INJECTION, SOLUTION INTRAVENOUS; SUBCUTANEOUS PRN
Status: DISCONTINUED | OUTPATIENT
Start: 2021-12-09 | End: 2021-12-09 | Stop reason: SDUPTHER

## 2021-12-09 RX ORDER — HYDRALAZINE HYDROCHLORIDE 20 MG/ML
5 INJECTION INTRAMUSCULAR; INTRAVENOUS EVERY 10 MIN PRN
Status: DISCONTINUED | OUTPATIENT
Start: 2021-12-09 | End: 2021-12-11 | Stop reason: HOSPADM

## 2021-12-09 RX ORDER — ONDANSETRON 4 MG/1
4 TABLET, ORALLY DISINTEGRATING ORAL EVERY 8 HOURS PRN
Status: DISCONTINUED | OUTPATIENT
Start: 2021-12-09 | End: 2021-12-11 | Stop reason: HOSPADM

## 2021-12-09 RX ORDER — ONDANSETRON 2 MG/ML
4 INJECTION INTRAMUSCULAR; INTRAVENOUS EVERY 6 HOURS PRN
Status: DISCONTINUED | OUTPATIENT
Start: 2021-12-09 | End: 2021-12-11 | Stop reason: HOSPADM

## 2021-12-09 RX ADMIN — SODIUM CHLORIDE, POTASSIUM CHLORIDE, SODIUM LACTATE AND CALCIUM CHLORIDE: 600; 310; 30; 20 INJECTION, SOLUTION INTRAVENOUS at 13:05

## 2021-12-09 RX ADMIN — ESMOLOL HYDROCHLORIDE 30 MG: 10 INJECTION, SOLUTION INTRAVENOUS at 14:46

## 2021-12-09 RX ADMIN — ACETAMINOPHEN 650 MG: 325 TABLET ORAL at 19:07

## 2021-12-09 RX ADMIN — FENTANYL CITRATE 25 MCG: 50 INJECTION, SOLUTION INTRAMUSCULAR; INTRAVENOUS at 15:23

## 2021-12-09 RX ADMIN — HEPARIN SODIUM 3700 UNITS: 1000 INJECTION, SOLUTION INTRAVENOUS; SUBCUTANEOUS at 14:31

## 2021-12-09 RX ADMIN — FENTANYL CITRATE 25 MCG: 50 INJECTION, SOLUTION INTRAMUSCULAR; INTRAVENOUS at 15:08

## 2021-12-09 RX ADMIN — FENTANYL CITRATE 50 MCG: 50 INJECTION, SOLUTION INTRAMUSCULAR; INTRAVENOUS at 13:46

## 2021-12-09 RX ADMIN — ESMOLOL HYDROCHLORIDE 10 MG: 10 INJECTION, SOLUTION INTRAVENOUS at 14:20

## 2021-12-09 RX ADMIN — MIDAZOLAM HYDROCHLORIDE 2 MG: 1 INJECTION, SOLUTION INTRAMUSCULAR; INTRAVENOUS at 13:40

## 2021-12-09 RX ADMIN — FENTANYL CITRATE 50 MCG: 50 INJECTION, SOLUTION INTRAMUSCULAR; INTRAVENOUS at 14:27

## 2021-12-09 RX ADMIN — ESMOLOL HYDROCHLORIDE 10 MG: 10 INJECTION, SOLUTION INTRAVENOUS at 14:10

## 2021-12-09 RX ADMIN — IODIXANOL 68 ML: 270 INJECTION, SOLUTION INTRAVASCULAR at 15:43

## 2021-12-09 RX ADMIN — ESMOLOL HYDROCHLORIDE 10 MG: 10 INJECTION, SOLUTION INTRAVENOUS at 14:17

## 2021-12-09 RX ADMIN — ESMOLOL HYDROCHLORIDE 20 MG: 10 INJECTION, SOLUTION INTRAVENOUS at 14:36

## 2021-12-09 RX ADMIN — FENTANYL CITRATE 50 MCG: 50 INJECTION, SOLUTION INTRAMUSCULAR; INTRAVENOUS at 14:07

## 2021-12-09 RX ADMIN — HEPARIN SODIUM 1000 UNITS: 1000 INJECTION, SOLUTION INTRAVENOUS; SUBCUTANEOUS at 14:51

## 2021-12-09 RX ADMIN — NICARDIPINE HYDROCHLORIDE 5 MG/HR: 0.1 INJECTION INTRAVENOUS at 14:55

## 2021-12-09 RX ADMIN — SODIUM CHLORIDE: 9 INJECTION, SOLUTION INTRAVENOUS at 16:49

## 2021-12-09 ASSESSMENT — PULMONARY FUNCTION TESTS
PIF_VALUE: 0
PIF_VALUE: 1
PIF_VALUE: 0
PIF_VALUE: 2
PIF_VALUE: 0

## 2021-12-09 ASSESSMENT — PAIN SCALES - GENERAL
PAINLEVEL_OUTOF10: 0
PAINLEVEL_OUTOF10: 5
PAINLEVEL_OUTOF10: 6

## 2021-12-09 ASSESSMENT — PAIN - FUNCTIONAL ASSESSMENT: PAIN_FUNCTIONAL_ASSESSMENT: 0-10

## 2021-12-09 NOTE — BRIEF OP NOTE
Brief Postoperative Note                    [facility]    NEUROENDOVASCULAR SERVICE: POST-OP NOTE: 12/9/2021    Pt Name: Panda Butler  MRN: [id number]  [place]: 1960  Date of Procedure: 12/9/2021  Primary Care Physician: Kimberly Roebrt DO      Pre-Procedural Diagnosis:Left ICA pseudoaneurysm   Post-Procedural Diagnosis:Same as above       Procedure Performed: Endovascular left ICA stenting     Surgeon:   Cande Dalal MD    Fellow:  MD Hillary Diallo MD, PhD    1st Assistant:  Vanessa Carrington    PRE-PROCEDURAL EXAM:  Prestroke baseline mRS MODIFIED MELINA SCORE: 0 - No symptoms at all. Neurological exam performed and unchanged from initial H&P or consult    Anesthesia: MAC   Complications: none    Intra-Operative EXAM:  Neurological exam performed and unchanged from initial H&P or consult    EBL: < less than 100       Cc            Specimens: Were not Obtained  Contrast:     Visipaque 270 low osmolar 68 Cc             Fluoro: 24.6 min    Findings:  Please see dictated Radiology note for further details  --Left ICA pseudoaneurysm measuring 6.15 mm x 4.75 mm   --Transcatheter Wallstent 10 mm x 31 mm placement across the pseudoaneurysm extending from left ICA distally to Left CCA  --No intra-stent stenosis, good wall apposition. Slight contrast stagnation in the pseudoaneurysm. --No evidence of distal embolization post-procedure. POST-PROCEDURAL EXAM :   Stable neurological Exam  Neurological exam performed and unchanged from initial H&P or consult    Closure:  right [place] 6   F    POST-PROCEDURAL MONITORING : see orders  Disposition: Recovery room      Recommendations:  --Back to Recovery room   --Do not bend right leg for 3 hours. --Groin checks per protocol. --Peripheral pulse checks per protocol. --SBP goal 100-140 mm Hg   --c/w ASA 81 mg and Plavix 75 mg from tomorrow.    --Follow up with Dr. Jasmyn Haile 2 weeks after discharge and Dr. Wilton Bradley 3 months after discharge.     Chad Castillo MD fellow    Charlene Aranda MD   Pager: [phone]  Stroke, [facility]  [facility] [address]  Electronically signed 12/9/2021 at 3:35 PM

## 2021-12-09 NOTE — H&P
file    Number of children: Not on file    Years of education: Not on file    Highest education level: Not on file   Occupational History    Not on file   Tobacco Use    Smoking status: Former Smoker     Packs/day: 0.50     Years: 30.00     Pack years: 15.00     Quit date: 2021     Years since quittin.6    Smokeless tobacco: Never Used   Vaping Use    Vaping Use: Never used   Substance and Sexual Activity    Alcohol use: No    Drug use: No    Sexual activity: Not on file   Other Topics Concern    Not on file   Social History Narrative    Not on file     Social Determinants of Health     Financial Resource Strain: Low Risk     Difficulty of Paying Living Expenses: Not hard at all   Food Insecurity: No Food Insecurity    Worried About Running Out of Food in the Last Year: Never true    Jose Armando of Food in the Last Year: Never true   Transportation Needs:     Lack of Transportation (Medical): Not on file    Lack of Transportation (Non-Medical): Not on file   Physical Activity:     Days of Exercise per Week: Not on file    Minutes of Exercise per Session: Not on file   Stress:     Feeling of Stress : Not on file   Social Connections:     Frequency of Communication with Friends and Family: Not on file    Frequency of Social Gatherings with Friends and Family: Not on file    Attends Jain Services: Not on file    Active Member of [address] [place] [address] or Organizations: Not on file    Attends Club or Organization Meetings: Not on file    Marital Status: Not on file   Intimate Partner Violence:     Fear of Current or Ex-Partner: Not on file    Emotionally Abused: Not on file    Physically Abused: Not on file    Sexually Abused: Not on file   Housing Stability:     Unable to Pay for Housing in the Last Year: Not on file    Number of [place] in the Last Year: Not on file    Unstable Housing in the Last Year: Not on file       Family History:   History reviewed.  No pertinent family history. Allergies: Other    Medications Prior to Admission:    Medications Prior to Admission: clopidogrel (PLAVIX) 75 MG tablet, Take 1 tablet by mouth daily  rosuvastatin (CRESTOR) 40 MG tablet, TAKE 1 TABLET BY MOUTH  DAILY  losartan (COZAAR) 50 MG tablet, TAKE 1 TABLET BY MOUTH  DAILY  amLODIPine (NORVASC) 10 MG tablet, Take 1 tablet by mouth daily  pantoprazole (PROTONIX) 40 MG tablet, Take 1 tablet by mouth daily  aspirin EC 81 MG EC tablet, Take 1 tablet by mouth daily  fluticasone (FLONASE) 50 MCG/ACT nasal spray, 2 sprays by Each Nostril route daily  triamcinolone (KENALOG) 0.1 % ointment, Apply topically 2 times daily  sucralfate (CARAFATE) 1 GM tablet, Take 1 tablet by mouth 4 times daily  fluocinolone (DERMOTIC) 0.01 % OIL oil, Put 4 drops into the affected ear(s) twice daily x 2 weeks. After that can decrease use to 1-2 times a week for maintenance.   Probiotic Product (PROBIOTIC-10) CAPS, Take by mouth daily  famotidine (PEPCID) 20 MG tablet, Take 20 mg by mouth 2 times daily  Multiple Vitamin (MULTI-VITAMIN DAILY PO), Take by mouth  Ascorbic Acid (VITAMIN C) 250 MG tablet, Take 250 mg by mouth daily  Cholecalciferol (VITAMIN D3) 2000 units CAPS, Take by mouth  acetaminophen (TYLENOL) 325 MG tablet, Take 325 mg by mouth every 6 hours as needed for Pain  meloxicam (MOBIC) 15 MG tablet, take 1 tablet by mouth once daily  nicotine (NICODERM CQ) 21 MG/24HR, Place 1 patch onto the skin daily    Current Medications:  Current Facility-Administered Medications: metoclopramide (REGLAN) injection 10 mg, 10 mg, IntraVENous, Once PRN  hydrALAZINE (APRESOLINE) injection 5 mg, 5 mg, IntraVENous, Q10 Min PRN  acetaminophen (TYLENOL) tablet 650 mg, 650 mg, Oral, Q4H PRN  ondansetron (ZOFRAN-ODT) disintegrating tablet 4 mg, 4 mg, Oral, Q8H PRN **OR** ondansetron (ZOFRAN) injection 4 mg, 4 mg, IntraVENous, Q6H PRN  0.9 % sodium chloride infusion, , IntraVENous, Continuous  [START ON 12/10/2021] clopidogrel (PLAVIX) tablet 75 mg, 75 mg, Oral, Daily  [START ON 12/10/2021] aspirin EC tablet 81 mg, 81 mg, Oral, Daily  rosuvastatin (CRESTOR) tablet 40 mg, 40 mg, Oral, Nightly  [START ON 12/10/2021] amLODIPine (NORVASC) tablet 10 mg, 10 mg, Oral, Daily  [START ON 12/10/2021] pantoprazole (PROTONIX) tablet 40 mg, 40 mg, Oral, QAM AC  [START ON 12/10/2021] losartan (COZAAR) tablet 50 mg, 50 mg, Oral, Daily  labetalol (NORMODYNE;TRANDATE) injection 10 mg, 10 mg, IntraVENous, Q4H PRN    REVIEW OF SYSTEMS     CONSTITUTIONAL: negative for fatigue and malaise   EYES: negative for double vision and photophobia    HEENT: negative for tinnitus and sore throat   RESPIRATORY: negative for cough, shortness of breath   CARDIOVASCULAR: negative for chest pain, palpitations, or syncope   GASTROINTESTINAL: negative for abdominal pain, nausea, vomiting, diarrhea, or constipation    GENITOURINARY: negative for incontinence or retention    MUSCULOSKELETAL: negative for neck or back pain, negative for extremity pain   NEUROLOGICAL: Negative for seizures, headaches, weakness, numbness, confusion, aphasia, dysarthria    PSYCHIATRIC: negative for agitation, hallucination, SI/HI   SKIN Negative for spontaneous contusions, rashes, or lesions      PHYSICAL EXAM:     BP (!) 129/93   Pulse 104   Temp 97.3 °F (36.3 °C) (Temporal)   Resp 20   Ht 5' 6\" (1.676 m)   Wt 120 lb (54.4 kg)   LMP  (LMP Unknown)   SpO2 96%   BMI 19.37 kg/m²     PHYSICAL EXAM:  CONSTITUTIONAL:  Well developed, well nourished, alert and oriented x 3, in no acute distress. GCS 15. Nontoxic. No dysarthria. No aphasia. HEAD:  normocephalic, atraumatic    EYES:  PERRLA, EOMI.   ENT:  moist mucous membranes   LUNGS:  Equal air entry bilaterally   CARDIOVASCULAR:  normal s1 / s2, distal pulses by doppler   ABDOMEN:  Soft, no rigidity   NECK supple, symmetric   SKIN Right groin safeguard in place, no drainage noted.     NEUROLOGIC:  Mental Status:  A & O x3,awake Cranial Nerves:    cranial nerves II-XII are grossly intact    Motor Exam:    Drift:  absent  Tone:  normal    Motor exam is symmetrical 5 out of 5 all extremities bilaterally    Sensory:    Touch:    Right Upper Extremity:  normal  Left Upper Extremity:  normal  Right Lower Extremity:  normal  Left Lower Extremity:  normal         LABS AND IMAGING:     RECENT LABS:  CBC with Differential:    Lab Results   Component Value Date    WBC 8.7 05/06/2021    RBC 4.34 05/06/2021    HGB 13.4 05/06/2021    HCT 39.0 05/06/2021     05/06/2021    MCV 89.7 05/06/2021    MCH 31.0 05/06/2021    MCHC 34.5 05/06/2021    RDW 13.1 05/06/2021    LYMPHOPCT 21 05/06/2021    MONOPCT 10 05/06/2021    BASOPCT 0 05/06/2021    MONOSABS 0.90 05/06/2021    LYMPHSABS 1.80 05/06/2021    EOSABS 0.20 05/06/2021    BASOSABS 0.00 05/06/2021    DIFFTYPE YES 05/06/2021     BMP:    Lab Results   Component Value Date     05/06/2021    K 4.3 05/06/2021     05/06/2021    CO2 28 05/06/2021    BUN 20 11/12/2021    LABALBU 4.3 05/06/2021    CREATININE 0.89 12/09/2021    CREATININE 0.97 11/12/2021    CALCIUM 10.6 05/06/2021    GFRAA >60 11/12/2021    LABGLOM >60 12/09/2021    GLUCOSE 97 05/06/2021         ASSESSMENT AND PLAN:         ASSESSMENT:     This is a 62 y.o. female with history of HTN and prior right BG infarct who presents for elective Left ICA stenting for a left ICA origin pseudoaneurysm. Patient care will be discussed with attending, will reevaluate patient along with attending.      PLAN/MEDICAL DECISION MAKING:    NEUROLOGIC:  - POD # 0 left ICA origin pseudoaneurysm treated with stenting  - Continue aspirin 81mg and plavix 75 mg daily  - Goal -140  - Neuro checks per protocol    CARDIOVASCULAR:  - Goal -140  - History of HTN, continue home Norvasc 10 mg QD and Cozaar 50 mg QD  - Labetalol PRN  - Echocardiogram: EF 55%, asymmetric septal hypertrophy  - Continue telemetry    PULMONARY:  - Maintaining oxygen saturations on room air    RENAL/FLUID/ELECTROLYTE:  - Normal renal function  - Monitor I&Os  - Can discontinue Spicer  - IVF: normal saline @ 75 mL/hr  - Replace electrolytes PRN  - Daily BMP    GI/NUTRITION:  NUTRITION:  ADULT DIET; Regular   - History of type 1 antral ulcer, lesser curvature  - Continue Home Protonix     ID:  - Afebrile  - Continue to monitor for fevers  - Daily CBC    HEME:   - Monitor right groin for hematoma formation  - Daily CBC    ENDOCRINE:  - Continue to monitor blood glucose, goal <180    OTHER:  - PT/OT    PROPHYLAXIS:  Stress ulcer: PPI    DVT PROPHYLAXIS:  - SCD sleeves - Thigh High   - No chemoprophylaxis anticoagulation at this time.     DISPOSITION: Admit to ICU      ROGELIO [facility]  Neuro Critical Care Service   Pager [phone]  12/9/2021     4:43 PM

## 2021-12-09 NOTE — ANESTHESIA POSTPROCEDURE EVALUATION
Department of Anesthesiology  Postprocedure Note    Patient: Climmie Levels  MRN: [id number]  YOB: 1960  Date of evaluation: 12/9/2021  Time:  5:15 PM     Procedure Summary     Date: 12/09/21 Room / Location: [address] [place] [address] 83 Procedures    Anesthesia Start: 3967 Anesthesia Stop: 3692    Procedure: IR ANGIOGRAM CAROTID CEREBRAL BILATERAL Diagnosis:       Pseudoaneurysm ([address]      Cerebral arterial embolism      (carotid stent)    Scheduled Providers:  Responsible Provider: Richie Llanes MD    Anesthesia Type: general ASA Status: 2          Anesthesia Type: general    Yuliana Phase I:      Yuliana Phase II:      Last vitals: Reviewed and per EMR flowsheets.    POST-OP ANESTHESIA NOTE       BP (!) 129/93   Pulse 104   Temp 97.3 °F (36.3 °C) (Temporal)   Resp 20   Ht 5' 6\" (1.676 m)   Wt 120 lb (54.4 kg)   LMP  (LMP Unknown)   SpO2 96%   BMI 19.37 kg/m²    Pain Assessment: 0-10  Pain Level: 0          Anesthesia Post Evaluation    Patient location during evaluation: PACU  Patient participation: complete - patient participated  Level of consciousness: awake  Pain score: 0  Airway patency: patent  Nausea & Vomiting: no vomiting and no nausea  Complications: no  Cardiovascular status: hemodynamically stable  Respiratory status: acceptable  Hydration status: stable

## 2021-12-09 NOTE — SEDATION DOCUMENTATION
Clorox [facility] - Carotid Wall Stent deployed, LICA without obvious complications.

## 2021-12-09 NOTE — SEDATION DOCUMENTATION
Closure Time:    Vascade deployed in Rt groin.   All instrumentation removed  Manual pressure held at puncture site by Dr. Patel People

## 2021-12-09 NOTE — ANESTHESIA PRE PROCEDURE
Department of Anesthesiology  Preprocedure Note       Name:  Mecca Mixon   Age:  62 y.o.  :  1960                                          MRN:  [id number]         Date:  2021      Surgeon: * No surgeons listed *    Procedure: * No procedures listed *    Medications prior to admission:   Prior to Admission medications    Medication Sig Start Date End Date Taking? Authorizing Provider   clopidogrel (PLAVIX) 75 MG tablet Take 1 tablet by mouth daily 21  Yes Marielle Butterfield MD   rosuvastatin (CRESTOR) 40 MG tablet TAKE 1 TABLET BY MOUTH  DAILY 10/18/21  Yes Addie Hutchison DO   losartan (COZAAR) 50 MG tablet TAKE 1 TABLET BY MOUTH  DAILY 10/18/21  Yes Addie Hutchison DO   amLODIPine (NORVASC) 10 MG tablet Take 1 tablet by mouth daily 10/18/21  Yes Addie Hutchison DO   pantoprazole (PROTONIX) 40 MG tablet Take 1 tablet by mouth daily 10/18/21  Yes Addie Hutchison DO   aspirin EC 81 MG EC tablet Take 1 tablet by mouth daily 10/18/21  Yes Addie Hutchison DO   fluticasone (FLONASE) 50 MCG/ACT nasal spray 2 sprays by Each Nostril route daily 21  Yes Addie Hutchison DO   triamcinolone (KENALOG) 0.1 % ointment Apply topically 2 times daily 21  Yes Addie Hutchison DO   sucralfate (CARAFATE) 1 GM tablet Take 1 tablet by mouth 4 times daily 5/10/21  Yes Jose Ponce MD   fluocinolone (DERMOTIC) 0.01 % OIL oil Put 4 drops into the affected ear(s) twice daily x 2 weeks. After that can decrease use to 1-2 times a week for maintenance.  21  Yes AGNES Harrell   Probiotic Product (PROBIOTIC-10) CAPS Take by mouth daily   Yes Historical Provider, MD   famotidine (PEPCID) 20 MG tablet Take 20 mg by mouth 2 times daily   Yes Historical Provider, MD   Multiple Vitamin (MULTI-VITAMIN DAILY PO) Take by mouth   Yes Historical Provider, MD   Ascorbic Acid (VITAMIN C) 250 MG tablet Take 250 mg by mouth daily   Yes Historical Provider, MD   Cholecalciferol (VITAMIN D3) 2000 units CAPS Take by mouth   Yes Historical Provider, MD   acetaminophen (TYLENOL) 325 MG tablet Take 325 mg by mouth every 6 hours as needed for Pain   Yes Historical Provider, MD   meloxicam (MOBIC) 15 MG tablet take 1 tablet by mouth once daily 6/15/21   Historical Provider, MD   nicotine (NICODERM CQ) 21 MG/24HR Place 1 patch onto the skin daily 4/22/21   Nirali Ernst MD       Current medications:    Current Outpatient Medications   Medication Sig Dispense Refill    clopidogrel (PLAVIX) 75 MG tablet Take 1 tablet by mouth daily 30 tablet 1    rosuvastatin (CRESTOR) 40 MG tablet TAKE 1 TABLET BY MOUTH  DAILY 90 tablet 3    losartan (COZAAR) 50 MG tablet TAKE 1 TABLET BY MOUTH  DAILY 90 tablet 3    amLODIPine (NORVASC) 10 MG tablet Take 1 tablet by mouth daily 90 tablet 1    pantoprazole (PROTONIX) 40 MG tablet Take 1 tablet by mouth daily 30 tablet 5    aspirin EC 81 MG EC tablet Take 1 tablet by mouth daily 90 tablet 1    fluticasone (FLONASE) 50 MCG/ACT nasal spray 2 sprays by Each Nostril route daily 1 Bottle 1    triamcinolone (KENALOG) 0.1 % ointment Apply topically 2 times daily 30 g 1    sucralfate (CARAFATE) 1 GM tablet Take 1 tablet by mouth 4 times daily 120 tablet 3    fluocinolone (DERMOTIC) 0.01 % OIL oil Put 4 drops into the affected ear(s) twice daily x 2 weeks. After that can decrease use to 1-2 times a week for maintenance.  20 mL 2    Probiotic Product (PROBIOTIC-10) CAPS Take by mouth daily      famotidine (PEPCID) 20 MG tablet Take 20 mg by mouth 2 times daily      Multiple Vitamin (MULTI-VITAMIN DAILY PO) Take by mouth      Ascorbic Acid (VITAMIN C) 250 MG tablet Take 250 mg by mouth daily      Cholecalciferol (VITAMIN D3) 2000 units CAPS Take by mouth      acetaminophen (TYLENOL) 325 MG tablet Take 325 mg by mouth every 6 hours as needed for Pain      meloxicam (MOBIC) 15 MG tablet take 1 tablet by mouth once daily      nicotine (NICODERM CQ) 21 MG/24HR Place 1 patch onto the skin daily 30 patch 3     No current facility-administered medications for this encounter. Allergies: Allergies   Allergen Reactions    Other      Neoprene, itching/rash       Problem List:    Patient Active Problem List   Diagnosis Code    Chronic eczematous otitis externa of both ears H60.8X3    Essential hypertension I10    Infarction of right basal ganglia (HCC) I63.9    Pseudoaneurysm of carotid artery (HCC) I72.0       Past Medical History:        Diagnosis Date    Allergic rhinitis     dust mites and nickel    Bursitis of left shoulder     Essential hypertension 2021    Dr. Jade Dariusz    Pseudoaneurysm of carotid artery ([facility] [address] 2021    Transient ischemic attack 2021    Wears dentures     Wears glasses        Past Surgical History:        Procedure Laterality Date   [address]    [address]nd in [address] Right     laser on retina    UPPER GASTROINTESTINAL ENDOSCOPY N/A 2021    EGD ESOPHAGOGASTRODUODENOSCOPY WITH BIOPSIES performed by Ashli Gutierrez MD at [address] [place] [address] History:    Social History     Tobacco Use    Smoking status: Former Smoker     Packs/day: 0.50     Years: 30.00     Pack years: 15.00     Quit date: 2021     Years since quittin.6    Smokeless tobacco: Never Used   Substance Use Topics    Alcohol use:  No                                Counseling given: Not Answered      Vital Signs (Current):   Vitals:    21 1113 21 1149   BP:  113/61   Pulse:  87   Resp:  16   Temp:  97.3 °F (36.3 °C)   TempSrc:  Temporal   SpO2:  97%   Weight: 120 lb (54.4 kg) 120 lb (54.4 kg)   Height: 5' 6\" (1.676 m) 5' 6\" (1.676 m)                                              BP Readings from Last 3 Encounters:   21 113/61   21 112/68   21 120/60       NPO Status: Time of last liquid consumption:                         Time of last solid consumption:                         Date of last liquid consumption: 12/08/21                        Date of last solid food consumption: 12/08/21    BMI:   Wt Readings from Last 3 Encounters:   12/09/21 120 lb (54.4 kg)   09/09/21 121 lb 1.6 oz (54.9 kg)   07/29/21 117 lb (53.1 kg)     Body mass index is 19.37 kg/m². CBC:   Lab Results   Component Value Date    WBC 8.7 05/06/2021    RBC 4.34 05/06/2021    HGB 13.4 05/06/2021    HCT 39.0 05/06/2021    MCV 89.7 05/06/2021    RDW 13.1 05/06/2021     05/06/2021       CMP:   Lab Results   Component Value Date     05/06/2021    K 4.3 05/06/2021     05/06/2021    CO2 28 05/06/2021    BUN 20 11/12/2021    CREATININE 0.97 11/12/2021    GFRAA >60 11/12/2021    LABGLOM 59 11/12/2021    GLUCOSE 97 05/06/2021    PROT 7.5 05/06/2021    CALCIUM 10.6 05/06/2021    BILITOT 0.56 05/06/2021    ALKPHOS 97 05/06/2021    AST 19 05/06/2021    ALT 16 05/06/2021       POC Tests: No results for input(s): POCGLU, POCNA, POCK, POCCL, POCBUN, POCHEMO, POCHCT in the last 72 hours.     Coags: No results found for: PROTIME, INR, APTT    HCG (If Applicable): No results found for: PREGTESTUR, PREGSERUM, HCG, HCGQUANT     ABGs: No results found for: PHART, PO2ART, QIO0FDG, DRE9JCQ, BEART, D5HROKJO     Type & Screen (If Applicable):  No results found for: LABABO, LABRH    Drug/Infectious Status (If Applicable):  No results found for: HIV, HEPCAB    COVID-19 Screening (If Applicable):   Lab Results   Component Value Date    COVID19 Not Detected 12/09/2021    COVID19 Not Detected 09/09/2021    COVID19 Not Detected 09/30/2020           Anesthesia Evaluation  Patient summary reviewed and Nursing notes reviewed no history of anesthetic complications:   Airway: Mallampati: I  TM distance: >3 FB   Neck ROM: full  Mouth opening: > = 3 FB Dental:    (+) edentulous      Pulmonary:Negative Pulmonary ROS and normal exam                               Cardiovascular:    (+) hypertension:,                   Neuro/Psych:   (+) TIA,             GI/Hepatic/Renal: (+) GERD:,           Endo/Other: Negative Endo/Other ROS                    Abdominal:             Vascular: Other Findings:             Anesthesia Plan      general     ASA 2       Induction: intravenous. MIPS: Postoperative opioids intended and Prophylactic antiemetics administered. Anesthetic plan and risks discussed with patient. Plan discussed with CRNA.                   Rudi Cannon MD   12/9/2021

## 2021-12-09 NOTE — SEDATION DOCUMENTATION
Blood drawn off Lt wrist Tonya for critical labs. Baseline ACT = 122    ISTAT = reviewed, no untoward results.

## 2021-12-09 NOTE — ANESTHESIA PROCEDURE NOTES
Arterial Line:    An arterial line was placed using ultrasound guidance, in the procedure area for the following indication(s): continuous blood pressure monitoring. A 20 gauge (size), 1 and 3/4 inch (length), Arrow (type) catheter was placed, Seldinger technique used, into the left radial artery, secured by tape and Tegaderm. Anesthesia type: Local  Local infiltration: Injection    Events:  patient tolerated procedure well with no complications and EBL < 5mL. Additional notes:  Sterile technique maintained. Pt tolerated well.   12/9/2021 1:55 PM  Anesthesiologist: Rod Matamoros MD  Resident/CRNA: ROGELIO Falcon - CRNA  Other anesthesia staff: Irma Camejo RN  Performed: Anesthesiologist and Other anesthesia staff   Preanesthetic Checklist  Completed: patient identified, IV checked, site marked, risks and benefits discussed, surgical consent, monitors and equipment checked, pre-op evaluation, timeout performed, anesthesia consent given, oxygen available and patient being monitored

## 2021-12-09 NOTE — SEDATION DOCUMENTATION
Post procedure assessment:  Pt groggy from sedation but AxOx3. Pt denies any significant discomfort. Resp even/ non labored. Skin pink, warm, dry, cap refill < 3 sec. Pt moves all extremities and and normal sensation in all extremities. Lt pedal pulse moderate by palpation. Rt posterial tibial weak by doppler. Dr. Jasmyn Haile present. Pupils PERRLA @ 3 mm.

## 2021-12-09 NOTE — H&P
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 Pt presents for stenting of the lesion. Pt was last seen in clinic 11/23/2021 with dr. Nelida Dickey. Since this time pt has been well and denies any focal symptoms. Allergies  is allergic to other. Medications  Prior to Admission medications    Medication Sig Start Date End Date Taking? Authorizing Provider   clopidogrel (PLAVIX) 75 MG tablet Take 1 tablet by mouth daily 11/23/21  Yes Festus Hinds MD   rosuvastatin (CRESTOR) 40 MG tablet TAKE 1 TABLET BY MOUTH  DAILY 10/18/21  Yes Argelia Watson DO   losartan (COZAAR) 50 MG tablet TAKE 1 TABLET BY MOUTH  DAILY 10/18/21  Yes Argelia Watson DO   amLODIPine (NORVASC) 10 MG tablet Take 1 tablet by mouth daily 10/18/21  Yes Argelia Watson DO   pantoprazole (PROTONIX) 40 MG tablet Take 1 tablet by mouth daily 10/18/21  Yes Argelia Watson DO   aspirin EC 81 MG EC tablet Take 1 tablet by mouth daily 10/18/21  Yes Argelia Watson DO   fluticasone (FLONASE) 50 MCG/ACT nasal spray 2 sprays by Each Nostril route daily 7/29/21  Yes Argelia Watson DO   triamcinolone (KENALOG) 0.1 % ointment Apply topically 2 times daily 7/29/21  Yes Argelia Watson DO   sucralfate (CARAFATE) 1 GM tablet Take 1 tablet by mouth 4 times daily 5/10/21  Yes Ashli Gutierrez MD   fluocinolone (DERMOTIC) 0.01 % OIL oil Put 4 drops into the affected ear(s) twice daily x 2 weeks. After that can decrease use to 1-2 times a week for maintenance.  1/4/21  Yes AGNES Harrell   Probiotic Product (PROBIOTIC-10) CAPS Take by mouth daily   Yes Historical Provider, MD   famotidine (PEPCID) 20 MG tablet Take 20 mg by mouth 2 times daily Yes Historical Provider, MD   Multiple Vitamin (MULTI-VITAMIN DAILY PO) Take by mouth   Yes Historical Provider, MD   Ascorbic Acid (VITAMIN C) 250 MG tablet Take 250 mg by mouth daily   Yes Historical Provider, MD   Cholecalciferol (VITAMIN D3) 2000 units CAPS Take by mouth   Yes Historical Provider, MD   acetaminophen (TYLENOL) 325 MG tablet Take 325 mg by mouth every 6 hours as needed for Pain   Yes Historical Provider, MD   meloxicam (MOBIC) 15 MG tablet take 1 tablet by mouth once daily 6/15/21   Historical Provider, MD   nicotine (NICODERM CQ) 21 MG/24HR Place 1 patch onto the skin daily 21   Meet Coulter MD    Scheduled Meds:  Continuous Infusions:   lactated ringers 100 mL/hr at 21 1305     PRN Meds:.meperidine, HYDROmorphone, HYDROmorphone, HYDROmorphone, HYDROmorphone, HYDROcodone 5 mg - acetaminophen, promethazine, metoclopramide, diphenhydrAMINE, hydrALAZINE  Past Medical History   has a past medical history of Allergic rhinitis, Bursitis of left shoulder, Essential hypertension, Pseudoaneurysm of carotid artery ([facility] [address] Transient ischemic attack, Wears dentures, and Wears glasses. Past Surgical History   has a past surgical history that includes  section (); Upper gastrointestinal endoscopy (N/A, 2021); and eye surgery (Right). Social History   reports that she quit smoking about 7 months ago. She has a 15.00 pack-year smoking history. She has never used smokeless tobacco.   reports no history of alcohol use. reports no history of drug use. Family History  family history is not on file.     Review of Systems:  CONSTITUTIONAL:  negative for fevers, chills, fatigue and malaise    EYES:  negative for double vision, blurred vision and photophobia     HEENT:  negative for tinnitus, epistaxis and sore throat    RESPIRATORY:  negative for cough, shortness of breath, wheezing    CARDIOVASCULAR:  negative for chest pain, palpitations, syncope, edema    GASTROINTESTINAL: extremity/renal/peripheral vascular compromise, informed consent obtained from pt. PLAN:   --L cervical ICA stent today  --continue asa 325 and plavix 75    Case discussed with Dr.  attending.     Franco Arreguin MD, PhD  Stroke, [facility] Stroke [facility]  Electronically signed 12/9/2021 at 1:43 PM

## 2021-12-10 LAB
ABSOLUTE EOS #: 0.16 K/UL (ref 0–0.44)
ABSOLUTE IMMATURE GRANULOCYTE: 0.04 K/UL (ref 0–0.3)
ABSOLUTE LYMPH #: 2.05 K/UL (ref 1.1–3.7)
ABSOLUTE MONO #: 1.02 K/UL (ref 0.1–1.2)
ANION GAP SERPL CALCULATED.3IONS-SCNC: 10 MMOL/L (ref 9–17)
BASOPHILS # BLD: 0 % (ref 0–2)
BASOPHILS ABSOLUTE: 0.03 K/UL (ref 0–0.2)
BUN BLDV-MCNC: 15 MG/DL (ref 8–23)
BUN/CREAT BLD: ABNORMAL (ref 9–20)
CALCIUM SERPL-MCNC: 8.4 MG/DL (ref 8.6–10.4)
CHLORIDE BLD-SCNC: 105 MMOL/L (ref 98–107)
CO2: 23 MMOL/L (ref 20–31)
CREAT SERPL-MCNC: 0.73 MG/DL (ref 0.5–0.9)
DIFFERENTIAL TYPE: ABNORMAL
EOSINOPHILS RELATIVE PERCENT: 2 % (ref 1–4)
GFR AFRICAN AMERICAN: >60 ML/MIN
GFR NON-AFRICAN AMERICAN: >60 ML/MIN
GFR SERPL CREATININE-BSD FRML MDRD: ABNORMAL ML/MIN/{1.73_M2}
GFR SERPL CREATININE-BSD FRML MDRD: ABNORMAL ML/MIN/{1.73_M2}
GLUCOSE BLD-MCNC: 140 MG/DL (ref 70–99)
HCT VFR BLD CALC: 27.5 % (ref 36.3–47.1)
HCT VFR BLD CALC: 28.1 % (ref 36.3–47.1)
HCT VFR BLD CALC: 28.2 % (ref 36.3–47.1)
HCT VFR BLD CALC: 28.4 % (ref 36.3–47.1)
HEMOGLOBIN: 9.4 G/DL (ref 11.9–15.1)
HEMOGLOBIN: 9.5 G/DL (ref 11.9–15.1)
HEMOGLOBIN: 9.5 G/DL (ref 11.9–15.1)
HEMOGLOBIN: 9.8 G/DL (ref 11.9–15.1)
IMMATURE GRANULOCYTES: 0 %
LYMPHOCYTES # BLD: 23 % (ref 24–43)
MCH RBC QN AUTO: 29.6 PG (ref 25.2–33.5)
MCHC RBC AUTO-ENTMCNC: 34.2 G/DL (ref 28.4–34.8)
MCV RBC AUTO: 86.5 FL (ref 82.6–102.9)
MONOCYTES # BLD: 11 % (ref 3–12)
NRBC AUTOMATED: 0 PER 100 WBC
PDW BLD-RTO: 14.4 % (ref 11.8–14.4)
PLATELET # BLD: 262 K/UL (ref 138–453)
PLATELET ESTIMATE: ABNORMAL
PMV BLD AUTO: 9.3 FL (ref 8.1–13.5)
POTASSIUM SERPL-SCNC: 3.9 MMOL/L (ref 3.7–5.3)
RBC # BLD: 3.18 M/UL (ref 3.95–5.11)
RBC # BLD: ABNORMAL 10*6/UL
SEG NEUTROPHILS: 64 % (ref 36–65)
SEGMENTED NEUTROPHILS ABSOLUTE COUNT: 5.83 K/UL (ref 1.5–8.1)
SODIUM BLD-SCNC: 138 MMOL/L (ref 135–144)
WBC # BLD: 9.1 K/UL (ref 3.5–11.3)
WBC # BLD: ABNORMAL 10*3/UL

## 2021-12-10 PROCEDURE — 6370000000 HC RX 637 (ALT 250 FOR IP): Performed by: STUDENT IN AN ORGANIZED HEALTH CARE EDUCATION/TRAINING PROGRAM

## 2021-12-10 PROCEDURE — 99223 1ST HOSP IP/OBS HIGH 75: CPT | Performed by: PSYCHIATRY & NEUROLOGY

## 2021-12-10 PROCEDURE — 6370000000 HC RX 637 (ALT 250 FOR IP): Performed by: NURSE PRACTITIONER

## 2021-12-10 PROCEDURE — 85025 COMPLETE CBC W/AUTO DIFF WBC: CPT

## 2021-12-10 PROCEDURE — 80048 BASIC METABOLIC PNL TOTAL CA: CPT

## 2021-12-10 PROCEDURE — 2000000003 HC NEURO ICU R&B

## 2021-12-10 PROCEDURE — 2580000003 HC RX 258: Performed by: STUDENT IN AN ORGANIZED HEALTH CARE EDUCATION/TRAINING PROGRAM

## 2021-12-10 PROCEDURE — 6360000002 HC RX W HCPCS

## 2021-12-10 PROCEDURE — 99024 POSTOP FOLLOW-UP VISIT: CPT | Performed by: PSYCHIATRY & NEUROLOGY

## 2021-12-10 PROCEDURE — 85014 HEMATOCRIT: CPT

## 2021-12-10 PROCEDURE — 94761 N-INVAS EAR/PLS OXIMETRY MLT: CPT

## 2021-12-10 PROCEDURE — 85018 HEMOGLOBIN: CPT

## 2021-12-10 RX ORDER — 0.9 % SODIUM CHLORIDE 0.9 %
500 INTRAVENOUS SOLUTION INTRAVENOUS ONCE
Status: COMPLETED | OUTPATIENT
Start: 2021-12-10 | End: 2021-12-10

## 2021-12-10 RX ORDER — FENTANYL CITRATE 50 UG/ML
INJECTION, SOLUTION INTRAMUSCULAR; INTRAVENOUS
Status: COMPLETED
Start: 2021-12-10 | End: 2021-12-10

## 2021-12-10 RX ORDER — FENTANYL CITRATE 50 UG/ML
50 INJECTION, SOLUTION INTRAMUSCULAR; INTRAVENOUS ONCE
Status: COMPLETED | OUTPATIENT
Start: 2021-12-10 | End: 2021-12-10

## 2021-12-10 RX ADMIN — CLOPIDOGREL 75 MG: 75 TABLET, FILM COATED ORAL at 08:30

## 2021-12-10 RX ADMIN — ACETAMINOPHEN 650 MG: 325 TABLET ORAL at 00:51

## 2021-12-10 RX ADMIN — ACETAMINOPHEN 650 MG: 325 TABLET ORAL at 15:14

## 2021-12-10 RX ADMIN — ROSUVASTATIN CALCIUM 40 MG: 20 TABLET, FILM COATED ORAL at 20:08

## 2021-12-10 RX ADMIN — SODIUM CHLORIDE 500 ML: 9 INJECTION, SOLUTION INTRAVENOUS at 07:45

## 2021-12-10 RX ADMIN — PANTOPRAZOLE SODIUM 40 MG: 40 TABLET, DELAYED RELEASE ORAL at 08:30

## 2021-12-10 RX ADMIN — LOSARTAN POTASSIUM 50 MG: 50 TABLET, FILM COATED ORAL at 08:08

## 2021-12-10 RX ADMIN — AMLODIPINE BESYLATE 10 MG: 10 TABLET ORAL at 08:08

## 2021-12-10 RX ADMIN — Medication 81 MG: at 08:30

## 2021-12-10 RX ADMIN — FENTANYL CITRATE 50 MCG: 50 INJECTION, SOLUTION INTRAMUSCULAR; INTRAVENOUS at 07:28

## 2021-12-10 ASSESSMENT — PAIN SCALES - GENERAL
PAINLEVEL_OUTOF10: 6
PAINLEVEL_OUTOF10: 7
PAINLEVEL_OUTOF10: 3

## 2021-12-10 NOTE — PROGRESS NOTES
Upon 0000 assessment RN noted increased bruising from right groin site extending to hip and amadeo area. Pulses found on doppler. No numbness or tingling to bilateral lower extremities. Patient states new right hand numbness present. Dr. Malcolm Lim at bedside to assess. Ordered to place ice and sandbag to right groin site for 30 minutes and reassess.

## 2021-12-10 NOTE — PROGRESS NOTES
[facility]  Occupational Therapy Not Seen Note    DATE: 12/10/2021    NAME: Monica Yip  MRN: [id number]   : 1960      Patient not seen this date for Occupational Therapy due to:    Patient is not appropriate for active participation in OT evaluation/treatment at this time d/t being medical unstable hypotension, per RN.      Next Scheduled Treatment: Ck in PM as able or     Electronically signed by Marcie Nguyen on 12/10/2021 at 7:57 AM

## 2021-12-10 NOTE — PROGRESS NOTES
Bruising noted on patient around insertion site and surrounding area. Dr. Ronna Briceno at bedside with [employer] bruising area marked by Ronna Briceno MD. Ronna Briceno MD applied pressure at insertion site of angiogram ( that was performed on 12/09/2021) for 30 minutes from 0730 to 0800  Patient was given 50 mcg of fentanyl prior to pressure being applied. Patient's arterial blood pressure decreased 76/35 at 0747 with good wave form. Patient alert and oriented x 4 . Bolus of 500 ml of 0.9% sodium chloride ordered and administered. Blood pressure increased to 134/53 on blood pressure cuff at 0838 and arterial blood pressure 150/62 with good wave form. 5 pound sand bag placed over top of insertion site after 30 minute manual pressure was held. Patient to remain flat with sandbag in place overtop of insertion site.

## 2021-12-10 NOTE — PLAN OF CARE
Pt assessed as a fall risk this shift. Remains free from falls and accidental injury at this time. Fall precautions in place, including falling star sign and fall risk band on pt. Floor free from obstacles, and bed is locked and in lowest position. Adequate lighting provided. Pt encouraged to call before getting Out Of Bed for any need. Bed alarm activated. Will continue to monitor needs during hourly rounding, and reinforce education on use of call light. NIH Stroke Scale  Interval: Reassessment  Level of Consciousness (1a. ): Alert  LOC Questions (1b. ): Answers both correctly  LOC Commands (1c. ): Performs both tasks correctly  Best Gaze (2. ): Normal  Visual (3. ): No visual loss  Facial Palsy (4. ): Normal symmetrical movement  Motor Arm, Left (5a. ): No drift  Motor Arm, Right (5b. ): No drift  Motor Leg, Left (6a. ): No drift  Motor Leg, Right (6b. ): No drift  Limb Ataxia (7. ): Absent  Sensory (8. ): Normal  Best Language (9. ): No aphasia  Dysarthria (10. ): Normal  Extinction and Inattention (11): No abnormality  Total: 0  Groin site bruising hasn't increased since 1100. Continue to monitor patient.

## 2021-12-10 NOTE — PLAN OF CARE
Problem: HEMODYNAMIC STATUS  Goal: Patient has stable vital signs and fluid balance  Outcome: Ongoing     Problem: ACTIVITY INTOLERANCE/IMPAIRED MOBILITY  Goal: Mobility/activity is maintained at optimum level for patient  Outcome: Ongoing     Problem: COMMUNICATION IMPAIRMENT  Goal: Ability to express needs and understand communication  Outcome: Ongoing    Neuro assessments completed. Fall and aspiration precautions in place. Barriers in communication and mobility assessed. Interventions to assist in communication and mobility in place. Adaptive devices used as needed.

## 2021-12-10 NOTE — PROGRESS NOTES
Physical Therapy        Physical Therapy Cancel Note      DATE: 12/10/2021    NAME: Delora Litten  MRN: [id number]   : 1960      Patient not seen this date for Physical Therapy due to: Other: per RN, pt not medically appropriate for PT evaluation this AM. PT will check back as time allows.       Electronically signed by Maurice Jarrett PT on 12/10/2021 at 11:27 AM

## 2021-12-10 NOTE — CARE COORDINATION
agreeable with plan      Discharge Plan: home independently          Electronically signed by Ella Petit, RN on 12/10/21 at 9:53 AM EST

## 2021-12-10 NOTE — PROGRESS NOTES
Daily Progress Note  Neuro Critical Care    Patient Name: Kit Wang  Patient : 1960  Room/Bed: 5834/1197-01  Code Status: Full  Allergies: Allergies   Allergen Reactions    Other      Neoprene, itching/rash       CHIEF COMPLAINT:      Elective L ICA stenting     INTERVAL HISTORY    Initial Presentation (Admitted 21): The patient is a 62 y.o. female with history of HTN and prior CVA who presented for elective left ICA stenting. Patient presented in 2021 with left sided weakness and was found to have a right basal ganglia infarct. Incidentally on CTA, patient was found to have a left ICA origin pseudoaneurysm and had a DSA at the time for confirmation. She has been following with endovascular outpatient for planning treatment.     Left ICA pseudoaneurysm was treated with stenting. During the procedure she was hypertensive requiring Cardene infusion to keep SBP < 140. Cardene infusion was weaned off prior to arrival in the ICU.     Upon arrival, patient is awake, alert and oriented, following commands. Her only complaint is nausea for which she received zofran. Right groin safeguard intact, distal pulses by doppler. Last 24h:   Overnight, patient was noted to have right groin ecchymosis after safeguard was removed around midnight. This morning, bruising again noted, tenderness noted to site. No flank bruising seen. Endovascular notified. Direct pressure held x 30 minutes followed by sand bag. Area of bruising marked with a skin marker to evaluate for extension. Distal pulses by doppler. Follow up hemoglobin around noon.      CURRENT MEDICATIONS:  SCHEDULED MEDICATIONS:   clopidogrel  75 mg Oral Daily    aspirin  81 mg Oral Daily    rosuvastatin  40 mg Oral Nightly    amLODIPine  10 mg Oral Daily    pantoprazole  40 mg Oral QAM AC    losartan  50 mg Oral Daily     CONTINUOUS INFUSIONS:    PRN MEDICATIONS:   hydrALAZINE, acetaminophen, ondansetron **OR** ondansetron, labetalol    VITALS:  Temperature Range: Temp: 97.3 °F (36.3 °C) Temp  Av.2 °F (36.2 °C)  Min: 97 °F (36.1 °C)  Max: 97.6 °F (36.4 °C)  BP Range: Systolic (97GDT), AIY:965 , Min:67 , CLM:579     Diastolic (24DZA), MKH:36, Min:41, Max:124    Pulse Range: Pulse  Av.1  Min: 77  Max: 115  Respiration Range: Resp  Av.4  Min: 0  Max: 53  Current Pulse Ox: SpO2: 96 %  24HR Pulse Ox Range: SpO2  Av.1 %  Min: 91 %  Max: 100 %  Patient Vitals for the past 12 hrs:   BP Temp Temp src Pulse Resp SpO2   12/10/21 0900 (!) 142/64 -- -- 92 15 96 %   12/10/21 0838 (!) 134/53 -- -- 87 18 98 %   12/10/21 0815 (!) 130/49 -- -- 89 15 97 %   12/10/21 0813 (!) 77/52 -- -- 91 18 96 %   12/10/21 0806 (!) 139/124 97.3 °F (36.3 °C) Oral 87 14 99 %   12/10/21 0800 (!) 67/41 -- -- 86 17 99 %   12/10/21 0700 102/63 -- -- 84 15 95 %   12/10/21 0600 (!) 122/56 -- -- 79 18 96 %   12/10/21 0500 (!) 108/49 -- -- 83 16 94 %   12/10/21 0400 (!) 86/48 97 °F (36.1 °C) Oral 83 16 93 %   12/10/21 0300 (!) 113/56 -- -- 84 15 96 %   12/10/21 0200 (!) 99/48 -- -- 82 14 96 %   12/10/21 0100 104/64 -- -- 80 15 95 %   12/10/21 0000 (!) 109/50 97 °F (36.1 °C) Oral 87 12 94 %   21 2300 (!) 102/49 -- -- 77 17 93 %     Estimated body mass index is 19.37 kg/m² as calculated from the following:    Height as of this encounter: 5' 6\" (1.676 m).     Weight as of this encounter: 120 lb (54.4 kg).  []<16 Severe malnutrition  []16-16.99 Moderate malnutrition  []17-18.49 Mild malnutrition  [x]18.5-24.9 Normal  []25-29.9 Overweight (not obese)  []30-34.9 Obese class 1 (Low Risk)  []35-39.9 Obese class 2 (Moderate Risk)  []?40 Obese class 3 (High Risk)    RECENT LABS:   Lab Results   Component Value Date    WBC 9.1 12/10/2021    HGB 9.4 (L) 12/10/2021    HCT 27.5 (L) 12/10/2021     12/10/2021    CHOL 192 2021    TRIG 172 (H) 2021    HDL 45 2021    ALT 16 2021    AST 19 2021     12/10/2021    K 3.9 12/10/2021 Labetalol PRN  - Echocardiogram: EF 55%, asymmetric septal hypertrophy  - Continue telemetry     PULMONARY:  - Maintaining oxygen saturations on room air     RENAL/FLUID/ELECTROLYTE:  - Normal renal function  - BUN 15 / Creatinine 0.73  - Monitor I&Os  - IVF: normal saline @ 75 mL/hr  - Replace electrolytes PRN  - Daily BMP     GI/NUTRITION:  NUTRITION:  ADULT DIET; Regular   - History of type 1 antral ulcer, lesser curvature  - Continue Home Protonix      ID:  - Afebrile, Tmax 36.4  - No leukocytosis, WBC 9.1  - Continue to monitor for fevers  - Daily CBC     HEME:   - H&H 9.4/27.6  - Platelets 979  - Right groin bruising  - Follow up repeat H&H at noon  - Daily CBC     ENDOCRINE:  - Continue to monitor blood glucose, goal <180     OTHER:  - PT/OT     PROPHYLAXIS:  Stress ulcer: PPI     DVT PROPHYLAXIS:  - SCD sleeves - Thigh High   - No chemoprophylaxis anticoagulation at this time - can start when H&H stable    DISPOSITION:  [x] To remain ICU  [] OK for out of ICU from Neuro Critical Care standpoint    We will continue to follow along. For any changes in exam or patient status please contact Neuro Critical Care.       ROGELIO Malcolm - CNP  Neuro Critical Care  Pager [phone]  12/10/2021     10:48 AM

## 2021-12-11 VITALS
HEART RATE: 95 BPM | HEIGHT: 66 IN | OXYGEN SATURATION: 97 % | BODY MASS INDEX: 19.29 KG/M2 | SYSTOLIC BLOOD PRESSURE: 102 MMHG | DIASTOLIC BLOOD PRESSURE: 71 MMHG | WEIGHT: 120 LBS | RESPIRATION RATE: 20 BRPM | TEMPERATURE: 98.8 F

## 2021-12-11 PROBLEM — I66.9 CEREBRAL ARTERIAL EMBOLISM: Status: RESOLVED | Noted: 2021-12-09 | Resolved: 2021-12-11

## 2021-12-11 LAB
ABSOLUTE EOS #: 0.45 K/UL (ref 0–0.44)
ABSOLUTE IMMATURE GRANULOCYTE: <0.03 K/UL (ref 0–0.3)
ABSOLUTE LYMPH #: 2.27 K/UL (ref 1.1–3.7)
ABSOLUTE MONO #: 1.09 K/UL (ref 0.1–1.2)
ANION GAP SERPL CALCULATED.3IONS-SCNC: 13 MMOL/L (ref 9–17)
BASOPHILS # BLD: 1 % (ref 0–2)
BASOPHILS ABSOLUTE: 0.04 K/UL (ref 0–0.2)
BUN BLDV-MCNC: 10 MG/DL (ref 8–23)
BUN/CREAT BLD: ABNORMAL (ref 9–20)
CALCIUM SERPL-MCNC: 9 MG/DL (ref 8.6–10.4)
CHLORIDE BLD-SCNC: 103 MMOL/L (ref 98–107)
CO2: 22 MMOL/L (ref 20–31)
CREAT SERPL-MCNC: 0.63 MG/DL (ref 0.5–0.9)
DIFFERENTIAL TYPE: ABNORMAL
EOSINOPHILS RELATIVE PERCENT: 5 % (ref 1–4)
GFR AFRICAN AMERICAN: >60 ML/MIN
GFR NON-AFRICAN AMERICAN: >60 ML/MIN
GFR SERPL CREATININE-BSD FRML MDRD: ABNORMAL ML/MIN/{1.73_M2}
GFR SERPL CREATININE-BSD FRML MDRD: ABNORMAL ML/MIN/{1.73_M2}
GLUCOSE BLD-MCNC: 102 MG/DL (ref 70–99)
HCT VFR BLD CALC: 26.6 % (ref 36.3–47.1)
HCT VFR BLD CALC: 27.6 % (ref 36.3–47.1)
HEMOGLOBIN: 9.3 G/DL (ref 11.9–15.1)
HEMOGLOBIN: 9.4 G/DL (ref 11.9–15.1)
IMMATURE GRANULOCYTES: 0 %
LYMPHOCYTES # BLD: 27 % (ref 24–43)
MCH RBC QN AUTO: 29.7 PG (ref 25.2–33.5)
MCHC RBC AUTO-ENTMCNC: 34.1 G/DL (ref 28.4–34.8)
MCV RBC AUTO: 87.3 FL (ref 82.6–102.9)
MONOCYTES # BLD: 13 % (ref 3–12)
NRBC AUTOMATED: 0 PER 100 WBC
PDW BLD-RTO: 14.4 % (ref 11.8–14.4)
PLATELET # BLD: 257 K/UL (ref 138–453)
PLATELET ESTIMATE: ABNORMAL
PMV BLD AUTO: 9 FL (ref 8.1–13.5)
POTASSIUM SERPL-SCNC: 4 MMOL/L (ref 3.7–5.3)
RBC # BLD: 3.16 M/UL (ref 3.95–5.11)
RBC # BLD: ABNORMAL 10*6/UL
SEG NEUTROPHILS: 54 % (ref 36–65)
SEGMENTED NEUTROPHILS ABSOLUTE COUNT: 4.53 K/UL (ref 1.5–8.1)
SODIUM BLD-SCNC: 138 MMOL/L (ref 135–144)
WBC # BLD: 8.4 K/UL (ref 3.5–11.3)
WBC # BLD: ABNORMAL 10*3/UL

## 2021-12-11 PROCEDURE — 99024 POSTOP FOLLOW-UP VISIT: CPT | Performed by: PSYCHIATRY & NEUROLOGY

## 2021-12-11 PROCEDURE — 85014 HEMATOCRIT: CPT

## 2021-12-11 PROCEDURE — 97530 THERAPEUTIC ACTIVITIES: CPT

## 2021-12-11 PROCEDURE — 97165 OT EVAL LOW COMPLEX 30 MIN: CPT

## 2021-12-11 PROCEDURE — 85025 COMPLETE CBC W/AUTO DIFF WBC: CPT

## 2021-12-11 PROCEDURE — 80048 BASIC METABOLIC PNL TOTAL CA: CPT

## 2021-12-11 PROCEDURE — 85018 HEMOGLOBIN: CPT

## 2021-12-11 PROCEDURE — 97535 SELF CARE MNGMENT TRAINING: CPT

## 2021-12-11 PROCEDURE — 6370000000 HC RX 637 (ALT 250 FOR IP): Performed by: NURSE PRACTITIONER

## 2021-12-11 PROCEDURE — 2500000003 HC RX 250 WO HCPCS: Performed by: NURSE PRACTITIONER

## 2021-12-11 PROCEDURE — 6370000000 HC RX 637 (ALT 250 FOR IP): Performed by: STUDENT IN AN ORGANIZED HEALTH CARE EDUCATION/TRAINING PROGRAM

## 2021-12-11 PROCEDURE — 97161 PT EVAL LOW COMPLEX 20 MIN: CPT

## 2021-12-11 RX ADMIN — PANTOPRAZOLE SODIUM 40 MG: 40 TABLET, DELAYED RELEASE ORAL at 08:34

## 2021-12-11 RX ADMIN — Medication 10 MG: at 01:44

## 2021-12-11 RX ADMIN — CLOPIDOGREL 75 MG: 75 TABLET, FILM COATED ORAL at 08:34

## 2021-12-11 RX ADMIN — Medication 81 MG: at 08:34

## 2021-12-11 RX ADMIN — ACETAMINOPHEN 650 MG: 325 TABLET ORAL at 03:58

## 2021-12-11 ASSESSMENT — PAIN SCALES - GENERAL
PAINLEVEL_OUTOF10: 0
PAINLEVEL_OUTOF10: 0
PAINLEVEL_OUTOF10: 6

## 2021-12-11 NOTE — PLAN OF CARE
Patient is discharged to home. Discharge instructions reviewed with patient. All questions that she had at the time answered. Patient stated that she understands all the discharge instructions and signed her name on  the discharge paperwork, awaiting patient's family to pick her up to go home.

## 2021-12-11 NOTE — PLAN OF CARE
Pt assessed as a fall risk this shift. Remains free from falls and accidental injury at this time. Fall precautions in place, including falling star sign and fall risk band on pt. Floor free from obstacles, and bed is locked and in lowest position. Adequate lighting provided. Pt encouraged to call before getting Out Of Bed for any need. Bed alarm activated.  Will continue to monitor needs during hourly rounding

## 2021-12-11 NOTE — PROGRESS NOTES
Endovascular Neurosurgery Progress Note    SUBJECTIVE:   No reported events overnight. This am pt reports that the r groin pain is improved. She is able to move around without difficulty. Review of Systems:  CONSTITUTIONAL:  negative for fevers, chills, fatigue and malaise    EYES:  negative for double vision, blurred vision and photophobia     HEENT:  negative for tinnitus, epistaxis and sore throat    RESPIRATORY:  negative for cough, shortness of breath, wheezing    CARDIOVASCULAR:  negative for chest pain, palpitations, syncope, edema    GASTROINTESTINAL:  negative for nausea, vomiting    GENITOURINARY:  negative for incontinence    MUSCULOSKELETAL:  negative for neck or back pain    NEUROLOGICAL:  Negative for weakness and tingling  negative for headaches and dizziness    PSYCHIATRIC:  negative for anxiety      Review of systems otherwise negative. OBJECTIVE:     Vitals:    12/11/21 1000   BP: 102/71   Pulse: 95   Resp: 20   Temp:    SpO2: 97%        General:  Gen: normal habitus, NAD  HEENT: NCAT, mucosa moist  Cvs: RRR, S1 S2 normal  Resp: symmetric unlabored breathing  Abd: s/nd/nt  Ext: no edema  Skin: warm and dry. Large bruise R groin including the r labia, r inner thigh, r lateral thigh. Tender to palpation. No palpable mass or pulsation. Stable from prior. Neuro:  Gen: awake and alert, oriented x3. Lang/speech: no aphasia or dysarthria. Follows commands. CN: PERRL, EOMI, VFF, V1-3 intact, face symmetric, hearing intact, shoulder shrug symmetric, tongue midline  Motor: grossly 5/5 UE and LE b/l  Sense: LT intact in all 4 ext. Coord: FTN and HTS intact b/l  DTR: deferred  Gait: deferred    NIH Stroke Scale:   1a  Level of consciousness: 0 - alert; keenly responsive   1b. LOC questions:  0 - answers both questions correctly   1c. LOC commands: 0 - performs both tasks correctly   2. Best Gaze: 0 - normal   3. Visual: 0 - no visual loss   4.  Facial Palsy: 0 - normal symmetric movement 5a. Motor left arm: 0 - no drift, limb holds 90 (or 45) degrees for full 10 seconds   5b. Motor right arm: 0 - no drift, limb holds 90 (or 45) degrees for full 10 seconds   6a. Motor left le - no drift; leg holds 30 degree position for full 5 seconds   6b  Motor right le - no drift; leg holds 30 degree position for full 5 seconds   7. Limb Ataxia: 0 - absent   8. Sensory: 0 - normal; no sensory loss   9. Best Language:  0 - no aphasia, normal   10. Dysarthria: 0 - normal   11. Extinction and Inattention: 0 - no abnormality         Total:   0     MRS: 0      LABS:   Reviewed. Lab Results   Component Value Date    HGB 9.4 (L) 2021    WBC 8.4 2021     2021     2021    BUN 10 2021    CREATININE 0.63 2021    AST 19 2021    ALT 16 2021      Lab Results   Component Value Date    COVID19 Not Detected 2021    COVID19 Not Detected 2021    COVID19 Not Detected 2020       RADIOLOGY:   Images were personally reviewed including:  DSA 2021  --medially and inferiorly facing pseudoaneurysm at the left ICA carotid bulb, dimensions length 7.21mm, width 4.11mm, height 5.46mm. A dissection flap is seen on the inferior aspect of the lesion. Grossly stable compared to prior imaging. --Above treated with embolization with Wallstent [id number] stent. Emboshield Nav6 was used for distal embolization protection. Yoni score 3.   --Hypoplastic right CFA. 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  S/p stenting of above 2021 yoni 3     Following procedure pt had increased bruising of the r groin and medial thigh. hgb stable, most recent 9.4    PLAN:   --continue asa 81 and plavix 75  --SBP goal 100-140 mm Hg   --stable fro dc to home from endovascular

## 2021-12-11 NOTE — DISCHARGE SUMMARY
her pulse is 92. Her respiration is 22 and oxygen saturation is 94%. CONSTITUTIONAL:  Well developed, well nourished, alert and oriented x 3, in no acute distress. GCS 15. Nontoxic. No dysarthria. No aphasia. HEAD:  normocephalic, atraumatic    EYES:  PERRLA, EOMI.   ENT:  moist mucous membranes   NECK:  supple, symmetric   LUNGS:  Equal air entry bilaterally   CARDIOVASCULAR:  normal s1 / s2, RRR, distal pulses intact by doppler   ABDOMEN:  Soft, no rigidity; Tenderness over right groin puncture site. Ecchymosis noted to right groin   NEUROLOGIC:  Mental Status:  A & O x3,awake             Cranial Nerves:    cranial nerves II-XII are grossly intact    Motor Exam:    Drift:  absent  Tone:  normal    Motor exam is symmetrical 5 out of 5 all extremities bilaterally    Sensory:    Touch:    Right Upper Extremity:  normal  Left Upper Extremity:  normal  Right Lower Extremity:  normal  Left Lower Extremity:  normal    Gait:  normal   NIH Stroke Scale Total (if not done complete detailed one below):    1a.  Level of consciousness:  0 - alert; keenly responsive  1b. Level of consciousness questions:  0 - answers both questions correctly  1c. Level of consciousness questions:  0 - performs both tasks correctly  2. Best Gaze:  0 - normal  3. Visual:  0 - no visual loss  4. Facial Palsy:  0 - normal symmetric movement  5a. Motor left arm:  0 - no drift, limb holds 90 (or 45) degrees for full 10 seconds  5b. Motor right arm:  0 - no drift, limb holds 90 (or 45) degrees for full 10 seconds  6a. Motor left le - no drift; leg holds 30 degree position for full 5 seconds  6b. Motor right le - no drift; leg holds 30 degree position for full 5 seconds  7. Limb Ataxia:  0 - absent  8. Sensory:  0 - normal; no sensory loss  9. Best Language:  0 - no aphasia, normal  10. Dysarthria:  0 - normal  11.   Extinction and Inattention:  0 - no abnormality  TOTAL:       LABS/IMAGING     Recent Labs 12/09/21  1406 12/10/21  0121 12/10/21  0520 12/10/21  1157 12/10/21  1815 12/11/21  0012 12/11/21  0532   WBC  --   --  9.1  --   --   --  8.4   HGB  --    < > 9.4*   < > 9.5* 9.3* 9.4*   HCT  --    < > 27.5*   < > 28.1* 26.6* 27.6*   PLT  --   --  262  --   --   --  257   NA  --   --  138  --   --   --  138   K  --   --  3.9  --   --   --  4.0   CL  --   --  105  --   --   --  103   CO2  --   --  23  --   --   --  22   BUN  --   --  15  --   --   --  10   CREATININE 0.89  --  0.73  --   --   --  0.63    < > = values in this interval not displayed. IR ANGIOGRAM CAROTID CEREBRAL BILATERAL    Result Date: 12/9/2021  Date of Service: 12/9/2021 Procedure: cerebral angiogram, left cervical internal carotid artery pseudoaneurysm stent embolization with distal embolization protection device Patient arrived to the angio suite at: xxx Anesthesia/sedation initiated at: xxx Puncture obtained at: xxx Vascular access was removed at: xxx Manual pressure for minutes: xxx Patient wheeled out of the angio suite at: xxx Diagnosis: left cervical internal carotid artery pseudoaneurysm Procedures: 1. Transarterial left cervical internal carotid artery (ICA) pseudoaneurysm stent embolization with distal embolization protection device 2. Selective left common carotid artery (CCA) angiogram 3. Selective left internal carotid artery (ICA) cerebral angiogram 4. Continuous intraarterial nicardipine infusion throughout the case to reduce the likelihood of spasm. 5. Right common femoral artery (CFA) angiogram Neurointerventionalist: Justina Huang MD, MS [place]: Omar Mason MD PhD Joanne Curtis MD Contrast: 68 cc of Visipaque-270. Fluoroscopy time: 28.1 minutes Access: right common femoral artery.  Comparison: 7/1/2021 Consent: After explaining the risks and benefits to the patient and the patient's family, including but not limited to stroke, coma, death, vessel injury, dissection, tear, occlusion, and X-ray dye allergic type reaction, a signed consent form was obtained. 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 Pt presents for stenting of the lesion. Anesthesia: Local anesthesia with lidocaine. MAC. Description and findings: The patient's right groin was prepped and draped in standard sterile fashion and under local anesthesia with conscious sedation. Fluoroscopy was used to localize the right common femoral artery, which was then accessed with a micropuncture technique, and  a 6 [employer] shuttle sheath was placed within the common femoral artery, establishing arterial access using Seldinger technique. A 5 [employer] select catheter was then advanced over a guide wire to the level of the aortic arch. 70 units/kg of Heparin was administered for a target ACT of 250-300. 10 mg of nicardipine was infused through the select catheter to reduce the likelihood of spasm. Left CCA technique: The left common carotid artery was selectively catheterized under fluoroscopic guidance and digital subtraction angiography images were obtained of the left cervical common carotid artery and of the intracranial left internal carotid artery circulation was performed in frontal and lateral projections. Interpretation: The left common carotid artery injection demonstrates normal antegrade flow into the external and internal carotid arteries with normal filling of the external carotid artery branches. There is mild plaque in the left ICA carotid bulb, with no stenosis by NASCET criteria. There is a medially and inferiorly facing pseudoaneurysm at the left ICA carotid bulb, dimensions length 7.21mm, width 4.11mm, height 5.46mm. A dissection flap is seen on the inferior aspect of the lesion. Lesion is grossly stable compared to prior imaging.  Caliber and lumen contour of the cervical portion of the left internal carotid artery are otherwise unremarkable. Further inspection demonstrates no other evidence of dissection, stenosis, aneurysm, or other vascular abnormality within the left CCA into the cervical segment of the left ICA. There is normal antegrade filling of the distal internal carotid artery, ophthalmic artery, anterior cerebral artery, middle cerebral artery and distal branches. The left M1 middle cerebral artery ends in an early trifurcation. Inspection of the remaining left internal carotid artery circulation revealed no other evidence of cerebral aneurysm, arteriovenous malformation, or arterial stenosis. Capillary and venous phase images were also unremarkable, with no evidence of veno-occlusive disease. Left ICA pseudoaneurysm stent embolization: The select catheter was removed and an [employer] Nav6 distal embolization protection device was advanced through the sheath and deployed in the left distal cervical ICA. A Wallstent [id number] stent was advanced along the distal embolization protection device wire and deployed at the site of the stenosis, bridging from the left ICA into the left CCA. The distal embolization protection device was retrieved. Famiila score 3. Left ICA technique: The left internal carotid artery was then selectively catheterized, and digital subtraction angiography of the intracranial left internal carotid artery circulation was performed in frontal and lateral projections. Interpretation: There are no interval new occlusions or embolizations. Right CFA technique: A right common femoral artery angiogram was performed and demonstrated arterial catheterization proximal to the bifurcation. The right CFA is hypoplastic, with near occlusion of the distal branches due to the sheath. The distal  branches are patent. There was no evidence of dissection or occlusion. A 5F Vascade closure device was used to establish hemostasis at the access site.  No immediate complications were experienced. Patient was re-examined after the procedure with no change noted in their neurologic examination, with distal pulses present. The patient was subsequently discharged from the neurointerventional suite to the neurointensive care unit. Impression: --medially and inferiorly facing pseudoaneurysm at the left ICA carotid bulb, dimensions length 7.21mm, width 4.11mm, height 5.46mm. A dissection flap is seen on the inferior aspect of the lesion. Grossly stable compared to prior imaging. --Above treated with embolization with Wallstent [id number] stent. Emboshield Nav6 was used for distal embolization protection. Familia score 3. --Hypoplastic right CFA. Dr. Caro Ramirez dictated this invasive procedure. Dr Brooke Powell was present for all procedural and imaging components of this case. Examination was reviewed and reported findings confirmed and evaluated by Dr. Brooke Powell. CTA HEAD NECK W CONTRAST    Result Date: 11/14/2021  EXAM: CTA HEAD NECK W CONTRAST HISTORY: Reason for exam:->Pseudoaneurysm COMPARISON: CTA head and neck 4/21/2021 TECHNIQUE: CTA head and neck performed following IV contrast. Coronal, sagittal, and MIP reformats included. 3D reconstructed images were performed on an independent workstation. Dose reduction techniques were achieved by using automated exposure control and/or adjustment of mA and/or kV according to patient size and/or use of iterative reconstruction technique. FINDINGS: The included aortic arch is unremarkable. The great vessel origins are limited by motion and streak artifact from dense contrast in the adjacent SVC. There is however likely stenosis at the origin of the left common carotid artery, estimated at 50% by NASCET criteria. Otherwise, the great vessel origins appear patent. The common carotid arteries are degraded by motion although otherwise appear patent without hemodynamically significant stenosis.  Mild atherosclerotic disease is seen within the distal common carotid arteries. Ulcerating plaque or pseudoaneurysm at the proximal left cervical internal carotid artery measuring approximately 5 x 4 mm, not significantly changed. The left cervical internal carotid artery appears patent without hemodynamically significant stenosis. The right carotid bifurcation, right cervical internal carotid artery are patent without hemodynamically significant stenosis. The vertebral arteries appear patent at their origins and throughout their cervical courses without significant stenosis. The right vertebral artery is dominant. There is no intracranial large vessel occlusion. There is severe stenosis at the left posterior cerebral artery P2 segment with patent distal branches. There is a severe short segment stenosis at the right posterior cerebral artery proximal P2 segment with patent distal branches. Otherwise, no significant stenosis. No aneurysm. The dural venous sinuses appear patent. The included upper lungs are clear. No cervical lymphadenopathy. Advanced degenerative changes of the cervical spine. 1. Ulcerating plaque/pseudoaneurysm at the proximal left internal carotid artery measuring approximately 5 x 4 mm not significantly changed from 4/21/2021. 2. Limited evaluation of the great vessel origins due to motion and streak artifact, however suspect moderate stenosis at the origin of the left common carotid artery, estimated at approximately 50% by NASCET criteria. 3. Otherwise, no hemodynamically significant stenosis within the extracranial carotid or vertebral arteries. 4. No intracranial large vessel occlusion. 5. Severe stenosis at the left posterior cerebral artery P2 segment. A severe short segment stenosis within the right posterior cerebral artery P2 segment.          DISCHARGE INSTRUCTIONS     Discharge Medications:        Medication List      CONTINUE taking these medications    acetaminophen 325 MG tablet  Commonly known as: TYLENOL     amLODIPine 10 MG tablet  Commonly known as: NORVASC  Take 1 tablet by mouth daily     aspirin EC 81 MG EC tablet  Take 1 tablet by mouth daily     clopidogrel 75 MG tablet  Commonly known as: Plavix  Take 1 tablet by mouth daily     fluocinolone 0.01 % Oil oil  Commonly known as: DERMOTIC  Put 4 drops into the affected ear(s) twice daily x 2 weeks. After that can decrease use to 1-2 times a week for maintenance. fluticasone 50 MCG/ACT nasal spray  Commonly known as: FLONASE  2 sprays by Each Nostril route daily     losartan 50 MG tablet  Commonly known as: COZAAR  TAKE 1 TABLET BY MOUTH  DAILY     meloxicam 15 MG tablet  Commonly known as: MOBIC     MULTI-VITAMIN DAILY PO     nicotine 21 MG/24HR  Commonly known as: NICODERM CQ  Place 1 patch onto the skin daily     pantoprazole 40 MG tablet  Commonly known as: PROTONIX  Take 1 tablet by mouth daily     Probiotic-10 Caps     rosuvastatin 40 MG tablet  Commonly known as: CRESTOR  TAKE 1 TABLET BY MOUTH  DAILY     sucralfate 1 GM tablet  Commonly known as: Carafate  Take 1 tablet by mouth 4 times daily     triamcinolone 0.1 % ointment  Commonly known as: KENALOG  Apply topically 2 times daily     vitamin C 250 MG tablet     Vitamin D3 50 MCG (2000 UT) Caps        STOP taking these medications    famotidine 20 MG tablet  Commonly known as: PEPCID          Diet: ADULT DIET; Regular diet as tolerated  Activity: As tolerated, no lifting greater than 10 lbs x 2 weeks  Follow-up:  Dr. Duke Hartman in 1 week; Dr. Azul Singh in 3 months; PCP as needed.    Time Spent for discharge: > 30 minutes    ROGELIO Arredondo - CNP  Neuro Critical Care  12/11/2021, 9:26 AM

## 2021-12-11 NOTE — PROGRESS NOTES
Physical Therapy    Facility/Department: [address]  Initial Assessment    NAME: Allison Hampton  : 1960  MRN: [id number]    Date of Service: 2021    Discharge Recommendations: No therapy recommended at discharge. No chief complaint on file. PT Equipment Recommendations  Equipment Needed: No    Assessment   Assessment: Pt ambulates 200 ft without AD independently. Pt should be safe to return to  prior living situation with intermittent support as needed. pt educated on d/c PT, agreeable. Prognosis: Good  Decision Making: Low Complexity  PT Education: Goals; PT Role; Plan of Care  Barriers to Learning: none  REQUIRES PT FOLLOW UP: No  Activity Tolerance  Activity Tolerance: Patient Tolerated treatment well       Patient Diagnosis(es): Diagnoses of Pseudoaneurysm ([address] and Cerebral arterial embolism were pertinent to this visit. has a past medical history of Allergic rhinitis, Bursitis of left shoulder, Essential hypertension, Pseudoaneurysm of carotid artery ([address] Transient ischemic attack, Wears dentures, and Wears glasses. has a past surgical history that includes  section (); Upper gastrointestinal endoscopy (N/A, 2021); and eye surgery (Right). Restrictions  Restrictions/Precautions  Restrictions/Precautions: General Precautions  Required Braces or Orthoses?: No  Position Activity Restriction  Other position/activity restrictions: SBP goal 100-140  Vision/Hearing  Vision: Impaired  Vision Exceptions: Wears glasses for distance  Hearing: Exceptions to [facility] (tinnitus)     Subjective  General  Patient assessed for rehabilitation services?: Yes  Response To Previous Treatment: Not applicable  Family / Caregiver Present: No  Follows Commands: Within Functional Limits  Subjective  Subjective: RN and pt agreeable to PT. pt agreeable and pleasant. Pt supine in bed at start of session.   Pain Screening  Patient Currently in Pain: Denies  Pain Assessment  Pain Assessment: 0-10  Pain Level: 0  Vital Signs  Patient Currently in Pain: Denies       Orientation  Orientation  Overall Orientation Status: Within Functional Limits  Social/Functional History  Social/Functional History  Lives With: Significant other, Son (Son is 31 yo)  Type of Home: House  Home Layout: Two level, Performs ADL's on one level, Able to Live on Main level with bedroom/bathroom, Laundry in basement (Pt normally respnsible for laundry in basement, however, family can assist if needed)  Home Access: Stairs to enter without rails  Entrance Stairs - Number of Steps: 2 RAJINDER with no HR, railing on L from basement as well  Bathroom Shower/Tub: Tub only  Bathroom Toilet: Standard  Bathroom Accessibility: Accessible  Home Equipment: Cane (not currently using)  ADL Assistance: Independent  Homemaking Assistance: Independent  Homemaking Responsibilities: Yes  Ambulation Assistance: Independent  Transfer Assistance: Independent  Active : Yes  Mode of Transportation: Car, Truck  Occupation: On disability  Type of occupation: Short-term disability since April '21 d/t L shoulder injury and other health concerns since then  Leisure & Hobbies: tv, reading, puzzles  Additional Comments: Sig other working, son off work and able to assist pt  Cognition   Cognition  Overall Cognitive Status: WFL    Objective          Joint Mobility  Spine: WFL  ROM RLE: WFL  ROM LLE: WFL  ROM RUE: WFL  ROM LUE: WFL  Strength RLE  Strength RLE: WFL  Strength LLE  Strength LLE: WFL  Strength RUE  Strength RUE: WFL  Strength LUE  Strength LUE: WFL  Tone RLE  RLE Tone: Normotonic  Tone LLE  LLE Tone: Normotonic  Motor Control  Gross Motor?: WFL  Sensation  Overall Sensation Status: WFL (pt denies n/t)  Bed mobility  Supine to Sit: Modified independent  Sit to Supine: Modified independent  Scooting: Modified independent  Comment: HOB elevated  Transfers  Sit to Stand: Independent  Stand to sit:  Independent  Comment: no AD used  Ambulation  Ambulation?: Yes  More Ambulation?: No  Ambulation 1  Surface: level tile  Device: No Device  Assistance: Independent  Gait Deviations: Slow Emma; Decreased step length; Decreased step height  Distance: 200 ft  Comments: No LOB noted. Pt reports mild dizziness/nausea during ambulation. BP post ambulation in supine 102/71 mmHg  Stairs/Curb  Stairs?: Yes  Stairs  # Steps : 10  Stairs Height: 6\"  Rails: Right ascending  Device: No Device  Assistance: Independent     Balance  Posture: Good  Sitting - Static: Good  Sitting - Dynamic: Good  Standing - Static: Good  Standing - Dynamic: Good  Comments: Assessed without AD        Plan   Plan  Times per week: d/c PT  Safety Devices  Type of devices:  All fall risk precautions in place, Gait belt, Call light within reach, Left in bed                                     AM-PAC Score  AM-PAC Inpatient Mobility Raw Score : 24 (12/11/21 1422)  AM-PAC Inpatient T-Scale Score : 61.14 (12/11/21 1422)  Mobility Inpatient CMS 0-100% Score: 0 (12/11/21 1422)  Mobility Inpatient CMS G-Code Modifier : [facility] (12/11/21 1422)          Goals  Patient Goals   Patient goals : d/c PT       Therapy Time   Individual Concurrent Group Co-treatment   Time In 0937         Time Out 1000         Minutes 23         Timed Code Treatment Minutes: [facility],

## 2021-12-11 NOTE — PLAN OF CARE
Problem: HEMODYNAMIC STATUS  Goal: Patient has stable vital signs and fluid balance  12/11/2021 0548 by Birdie Rehman RN  Outcome: Ongoing  12/10/2021 1855 by Emiliano Vargas RN  Outcome: Ongoing     Problem: ACTIVITY INTOLERANCE/IMPAIRED MOBILITY  Goal: Mobility/activity is maintained at optimum level for patient  12/11/2021 0548 by Birdie Rehman RN  Outcome: Ongoing  12/10/2021 1855 by Emiliano Vargas RN  Outcome: Ongoing     Problem: COMMUNICATION IMPAIRMENT  Goal: Ability to express needs and understand communication  12/11/2021 0548 by Birdie Rehman RN  Outcome: Ongoing  12/10/2021 1855 by Emiliano Vargas RN  Outcome: Ongoing

## 2021-12-11 NOTE — PROGRESS NOTES
Endovascular Neurosurgery Progress Note    SUBJECTIVE:   At around 12am midnight, bruising started to develop at the r groin access site. 5kg sandbag and ice was applied, without significant benefit. This am pt has R groin pain, as well as back pain from lying flat. Review of Systems:  CONSTITUTIONAL:  negative for fevers, chills, fatigue and malaise    EYES:  negative for double vision, blurred vision and photophobia     HEENT:  negative for tinnitus, epistaxis and sore throat    RESPIRATORY:  negative for cough, shortness of breath, wheezing    CARDIOVASCULAR:  negative for chest pain, palpitations, syncope, edema    GASTROINTESTINAL:  negative for nausea, vomiting    GENITOURINARY:  negative for incontinence    MUSCULOSKELETAL:  negative for neck or back pain    NEUROLOGICAL:  Negative for weakness and tingling  negative for headaches and dizziness    PSYCHIATRIC:  negative for anxiety      Review of systems otherwise negative. OBJECTIVE:     Vitals:    12/10/21 1800   BP:    Pulse: 92   Resp: 15   Temp: 98 °F (36.7 °C)   SpO2: 95%        General:  Gen: normal habitus, NAD  HEENT: NCAT, mucosa moist  Cvs: RRR, S1 S2 normal  Resp: symmetric unlabored breathing  Abd: s/nd/nt  Ext: no edema  Skin: warm and dry. Large bruise R groin including the r labia, r inner thigh, r lateral thigh. Tender to palpation. No palpable mass or pulsation. Neuro:  Gen: awake and alert, oriented x3. Lang/speech: no aphasia or dysarthria. Follows commands. CN: PERRL, EOMI, VFF, V1-3 intact, face symmetric, hearing intact, shoulder shrug symmetric, tongue midline  Motor: grossly 5/5 UE and LE b/l  Sense: LT intact in all 4 ext. Coord: FTN and HTS intact b/l  DTR: deferred  Gait: deferred    NIH Stroke Scale:   1a  Level of consciousness: 0 - alert; keenly responsive   1b. LOC questions:  0 - answers both questions correctly   1c. LOC commands: 0 - performs both tasks correctly   2. Best Gaze: 0 - normal   3.  Visual: 0 - no visual loss   4. Facial Palsy: 0 - normal symmetric movement   5a. Motor left arm: 0 - no drift, limb holds 90 (or 45) degrees for full 10 seconds   5b. Motor right arm: 0 - no drift, limb holds 90 (or 45) degrees for full 10 seconds   6a. Motor left le - no drift; leg holds 30 degree position for full 5 seconds   6b  Motor right le - no drift; leg holds 30 degree position for full 5 seconds   7. Limb Ataxia: 0 - absent   8. Sensory: 0 - normal; no sensory loss   9. Best Language:  0 - no aphasia, normal   10. Dysarthria: 0 - normal   11. Extinction and Inattention: 0 - no abnormality         Total:   0     MRS: 0      LABS:   Reviewed. Lab Results   Component Value Date    HGB 9.5 (L) 12/10/2021    WBC 9.1 12/10/2021     12/10/2021     12/10/2021    BUN 15 12/10/2021    CREATININE 0.73 12/10/2021    AST 19 2021    ALT 16 2021      Lab Results   Component Value Date    COVID19 Not Detected 2021    COVID19 Not Detected 2021    COVID19 Not Detected 2020       RADIOLOGY:   Images were personally reviewed including:  DSA 2021  --medially and inferiorly facing pseudoaneurysm at the left ICA carotid bulb, dimensions length 7.21mm, width 4.11mm, height 5.46mm. A dissection flap is seen on the inferior aspect of the lesion. Grossly stable compared to prior imaging. --Above treated with embolization with Wallstent [id number] stent. Emboshield Nav6 was used for distal embolization protection. Yoni score 3.   --Hypoplastic right CFA. 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  S/p stenting of above 2021 yoni 3     Following procedure pt had increased bruising of the r groin and medial thigh. hgb stable after initial drop from 11.8 to 9.4additional manual pressure of the hematoma performed for 30min this am.    PLAN:   --monitor for 24hrs  --hgb q6h  --continue asa 81 and plavix 75  --SBP goal 100-140 mm Hg   --ok for pt to mobilize. If bruising is stable tomorrow morning ok to dc to home at that time. --Follow up with Dr. To 2 weeks after discharge and Dr. Eamon Bay 3 months after discharge. Case discussed with Dr. Lesly Tee attending.     Ada Paniagua MD, PhD  Stroke, [facility]  [facility]  Electronically signed 12/10/2021 at 7:04 PM

## 2021-12-11 NOTE — PROGRESS NOTES
Occupational Therapy   Occupational Therapy Initial Assessment  Date: 2021   Patient Name: Kendall Doll  MRN: [id number]     : 1960    Date of Service: 2021  No chief complaint on file. Elective L ICA stenting on     Discharge Recommendations: No therapy recommended at discharge. Defer OT at this time  OT Equipment Recommendations  Equipment Needed: No    Assessment   Prognosis: Good  Decision Making: Low Complexity  OT Education: OT Role; Plan of Care  Patient Education: Good return  No Skilled OT: Independent with functional mobility; Independent with ADL's; At baseline function; Safe to return home  REQUIRES OT FOLLOW UP: No  Activity Tolerance  Activity Tolerance: Patient Tolerated treatment well  Safety Devices  Safety Devices in place: Yes  Type of devices: Gait belt; Nurse notified (Pt left walkng with PT Izzy Plata in hallway as writer exited room)  Restraints  Initially in place: No           Patient Diagnosis(es): Diagnoses of Pseudoaneurysm ([address] and Cerebral arterial embolism were pertinent to this visit. has a past medical history of Allergic rhinitis, Bursitis of left shoulder, Essential hypertension, Pseudoaneurysm of carotid artery ([address] Transient ischemic attack, Wears dentures, and Wears glasses. has a past surgical history that includes  section (); Upper gastrointestinal endoscopy (N/A, 2021); and eye surgery (Right).          Restrictions  Restrictions/Precautions  Restrictions/Precautions: General Precautions  Required Braces or Orthoses?: No  Position Activity Restriction  Other position/activity restrictions: SBP goal 100-140    Subjective   General  Patient assessed for rehabilitation services?: Yes  Family / Caregiver Present: No  Diagnosis: elective L ICA stenting on , pseudoaneurysm  Patient Currently in Pain: Denies  Pain Assessment  Pain Assessment: 0-10  Pain Level: 0  Vital Signs  Pulse: 95  Resp: 20  BP: 102/71  MAP (mmHg): 77  Patient Currently in Pain: Denies  Oxygen Therapy  SpO2: 97 %  Social/Functional History  Social/Functional History  Lives With: Significant other, Son (Son is 33 yo)  Type of Home: House  Home Layout: Two level, Performs ADL's on one level, Able to Live on Main level with bedroom/bathroom, Laundry in basement (Pt normally respnsible for laundry in basement, however, family can assist if needed)  Home Access: Stairs to enter without rails  Entrance Stairs - Number of Steps: 2 RAJINDER, railing on L from basement as well  Bathroom Shower/Tub: Tub only  Bathroom Toilet: Standard  Bathroom Accessibility: Accessible  Home Equipment: Cane (not currently using)  ADL Assistance: Independent  Homemaking Assistance: Independent  Homemaking Responsibilities: Yes  Ambulation Assistance: Independent  Transfer Assistance: Independent  Active : Yes  Mode of Transportation: Car, Truck  Occupation: On disability  Type of occupation: Short-term disability since April '21 d/t L shoulder injury and other health concerns since then  Leisure & Hobbies: tv, reading, puzzles  Additional Comments: Sig other working, son off work and able to assist pt       Objective   Vision: Impaired  Vision Exceptions: Wears glasses for distance  Hearing: Exceptions to [facility] (tinnitus)    Orientation  Overall Orientation Status: Within Functional Limits     Balance  Sitting Balance: Independent  Standing Balance: Independent  Standing Balance  Time: 5 min  Activity: Pt stood bedside, func mob to/from bathroom, sinkside/at toilet  Functional Mobility  Functional - Mobility Device: No device  Activity: To/from bathroom  Assist Level:  Independent  Functional Mobility Comments: No LOB or safety concerns noted  Toilet Transfers  Toilet - Technique: Ambulating  Equipment Used: Grab bars  Toilet Transfer: Modified independent  Toilet Transfers Comments: Pt did use R grab bar around toilet when standing  ADL  Feeding: Independent  Grooming: Independent  UE Bathing: Independent  LE Bathing: Independent  UE Dressing: Independent  LE Dressing: Independent  Toileting: Independent  Additional Comments: Pt doffed/donned R sock sitting EOB, pt transferred on/off toilet for successful urnation and bent over to flush safely, washed hands standing sinkside and also brushed hair to remove knot posteriorly, pt at baseline with no concerns  Tone RUE  RUE Tone: Normotonic  Tone LUE  LUE Tone: Normotonic  Coordination  Movements Are Fluid And Coordinated: Yes     Bed mobility  Supine to Sit: Independent  Sit to Supine: Unable to assess  Scooting: Independent  Comment: Pt supine in bed upon arrival with [facility] elevated, pt walking with PT in hallway as writer exited  Transfers  Sit to stand: Modified independent  Stand to sit: Modified independent     Cognition  Overall Cognitive Status: WFL        Sensation  Overall Sensation Status: WFL        LUE AROM (degrees)  LUE AROM : WFL  LUE General AROM: Shoulder flexion WFL's despite injury from April '21-observed to ~120 degrees  RUE AROM (degrees)  RUE AROM : WFL  LUE Strength  Gross LUE Strength: WFL  L Shoulder Flex: NT  L Elbow Flex: NT  L Elbow Ext: NT  L Hand General: 5/5  LUE Strength Comment: L shoulder not tested d/t injury per pt, also shoulder/elbow strength not formally tested as pt reports surgeon limiting pt to <10 lbs lifting restriction for 2 weeks-not in chart  RUE Strength  Gross RUE Strength: WFL  R Shoulder Flex: NT  R Elbow Flex: NT  R Elbow Ext: NT  R Hand General: 5/5  RUE Strength Comment: shoulder/elbow strength not formally tested as pt reports surgeon limiting pt to <10 lbs lifting restriction for 2 weeks-not in chart               AM-PAC Score        AM-PAC Inpatient Daily Activity Raw Score: 24 (12/11/21 1125)  AM-PAC Inpatient ADL T-Scale Score : 57.54 (12/11/21 1125)  ADL Inpatient CMS 0-100% Score: 0 (12/11/21 1125)  ADL Inpatient CMS G-Code Modifier : CH (12/11/21 1125)    Goals  Short term goals  Time Frame for Short term goals: OT eval and d/c d/t pt (I)     Therapy Time   Individual Concurrent Group Co-treatment   Time In 0938         Time Out 0954         Minutes 16         Timed Code Treatment Minutes: 8 Minutes       Arcelia Moreira OTR/L

## 2021-12-13 LAB
ALLEN TEST: NORMAL
ANION GAP: ABNORMAL MMOL/L (ref 8–16)
FIO2: NORMAL
GFR NON-AFRICAN AMERICAN: 60 ML/MIN
GFR SERPL CREATININE-BSD FRML MDRD: >60 ML/MIN
GFR SERPL CREATININE-BSD FRML MDRD: ABNORMAL ML/MIN/{1.73_M2}
GLUCOSE BLD-MCNC: 88 MG/DL (ref 74–100)
HCO3 VENOUS: 27.2 MMOL/L (ref 22–29)
MODE: NORMAL
NEGATIVE BASE EXCESS, VEN: NORMAL (ref 0–2)
O2 DEVICE/FLOW/%: NORMAL
O2 SAT, VEN: 76 % (ref 60–85)
PATIENT TEMP: NORMAL
PCO2, VEN: 46.1 MM HG (ref 41–51)
PH VENOUS: 7.38 (ref 7.32–7.43)
PO2, VEN: 41.9 MM HG (ref 30–50)
POC BUN: 21 MG/DL (ref 6–20)
POC CHLORIDE: 106 MMOL/L (ref 98–110)
POC CREATININE: 0.95 MG/DL (ref 0.51–1.19)
POC HEMATOCRIT: 40 % (ref 36–46)
POC HEMOGLOBIN: 13.5 G/DL (ref 12–16)
POC IONIZED CALCIUM: 0.98 MMOL/L (ref 1.15–1.33)
POC LACTIC ACID: 0.71 MMOL/L (ref 0.56–1.39)
POC PCO2 TEMP: NORMAL MM HG
POC PH TEMP: NORMAL
POC PO2 TEMP: NORMAL MM HG
POC POTASSIUM: >12 MMOL/L (ref 3.5–5.1)
POC SODIUM: 130 MMOL/L (ref 136–145)
POC TCO2: 28 MMOL/L (ref 20–31)
POSITIVE BASE EXCESS, VEN: 2 (ref 0–3)
SAMPLE SITE: NORMAL
TOTAL CO2, VENOUS: NORMAL MMOL/L (ref 23–30)

## 2021-12-17 ENCOUNTER — TELEMEDICINE (OUTPATIENT)
Dept: NEUROLOGY | Age: 61
End: 2021-12-17
Payer: COMMERCIAL

## 2021-12-17 DIAGNOSIS — I72.0 PSEUDOANEURYSM OF CAROTID ARTERY (HCC): Primary | ICD-10-CM

## 2021-12-17 PROCEDURE — 99214 OFFICE O/P EST MOD 30 MIN: CPT | Performed by: STUDENT IN AN ORGANIZED HEALTH CARE EDUCATION/TRAINING PROGRAM

## 2021-12-17 RX ORDER — CLOPIDOGREL BISULFATE 75 MG/1
75 TABLET ORAL DAILY
Qty: 30 TABLET | Refills: 2 | Status: SHIPPED | OUTPATIENT
Start: 2021-12-17 | End: 2022-03-30 | Stop reason: SDUPTHER

## 2021-12-17 NOTE — PROGRESS NOTES
[address] Surgery Telehealth Visit      Reason for Evaluation: TELEHEALTH EVALUATION -- Audio/Visual (During [id number] public health emergency) regarding f/u L cervical ICA pseudoaneurysm s/p stent and plasty      HPI:   62year old female with past medical history of hypertension, right basal ganglia stroke 4/2021 with no residual deficits, incidental left cervical internal carotid likely traumatic pseudoaneurysm S/p stenting 12/9/2021 yoni 3. Following procedure pt had increased bruising of the r groin and medial thigh. Pt has worse LUE weakness following the procedure, she reports more diffictuly with weights during rehab. She has underlying L shoulder pain which also inhibits movement, but this is not any worse. R groin bruising is improved. Although she still has some tenderness. She is able to perform her daily activities without problems.        Allergies   Allergen Reactions    Other      Neoprene, itching/rash     Current Outpatient Medications   Medication Sig Dispense Refill    clopidogrel (PLAVIX) 75 MG tablet Take 1 tablet by mouth daily 30 tablet 2    rosuvastatin (CRESTOR) 40 MG tablet TAKE 1 TABLET BY MOUTH  DAILY 90 tablet 3    losartan (COZAAR) 50 MG tablet TAKE 1 TABLET BY MOUTH  DAILY 90 tablet 3    amLODIPine (NORVASC) 10 MG tablet Take 1 tablet by mouth daily 90 tablet 1    pantoprazole (PROTONIX) 40 MG tablet Take 1 tablet by mouth daily 30 tablet 5    aspirin EC 81 MG EC tablet Take 1 tablet by mouth daily 90 tablet 1    meloxicam (MOBIC) 15 MG tablet take 1 tablet by mouth once daily      fluticasone (FLONASE) 50 MCG/ACT nasal spray 2 sprays by Each Nostril route daily 1 Bottle 1    triamcinolone (KENALOG) 0.1 % ointment Apply topically 2 times daily 30 g 1    sucralfate (CARAFATE) 1 GM tablet Take 1 tablet by mouth 4 times daily 120 tablet 3    nicotine (NICODERM CQ) 21 MG/24HR Place 1 patch onto the skin daily 30 patch 3    fluocinolone (DERMOTIC) 0.01 % OIL oil Put 4 drops into the affected ear(s) twice daily x 2 weeks. After that can decrease use to 1-2 times a week for maintenance. 20 mL 2    Probiotic Product (PROBIOTIC-10) CAPS Take by mouth daily      Multiple Vitamin (MULTI-VITAMIN DAILY PO) Take by mouth      Ascorbic Acid (VITAMIN C) 250 MG tablet Take 250 mg by mouth daily      Cholecalciferol (VITAMIN D3) 2000 units CAPS Take by mouth      acetaminophen (TYLENOL) 325 MG tablet Take 325 mg by mouth every 6 hours as needed for Pain       No current facility-administered medications for this visit. Past Medical History:   Diagnosis Date    Allergic rhinitis     dust mites and nickel    Bursitis of left shoulder     Essential hypertension 2021    Dr. Torrez Sees    Pseudoaneurysm of carotid artery [facility]) 2021    Transient ischemic attack 2021    Wears dentures     Wears glasses       Past Surgical History:   Procedure Laterality Date   [address]    2nd in [facility] Right     laser on retina    UPPER GASTROINTESTINAL ENDOSCOPY N/A 2021    EGD ESOPHAGOGASTRODUODENOSCOPY WITH BIOPSIES performed by Zoya Ray MD at [address]     No family history on file. Social History     Tobacco Use    Smoking status: Former Smoker     Packs/day: 0.50     Years: 30.00     Pack years: 15.00     Quit date: 2021     Years since quittin.6    Smokeless tobacco: Never Used   Substance Use Topics    Alcohol use: No            Review of Systems      Objective:   Exam limited by telehealth encounter. There were no vitals filed for this visit. General:  Gen: normal habitus, NAD  HEENT: NCAT  Cvs: deferred  Resp: symmetric unlabored breathing  Abd: deferred  Ext: no edema  Skin: no lesions seen    Neuro:  Gen: awake and alert, oriented x3. Lang/speech: no aphasia or dysarthria. Follows commands. CN: EOMI, face symmetric, hearing intact  Motor: at least 3/5 all 4 ext, no drift.   Sense: deferred  Coord: no gross dysmetria   DTR: deferred  Gait: deferred    NIH Stroke Scale:   1a  Level of consciousness: 0 - alert; keenly responsive   1b. LOC questions:  0 - answers both questions correctly   1c. LOC commands: 0 - performs both tasks correctly   2. Best Gaze: 0 - normal   3. Visual: 0 - no visual loss   4. Facial Palsy: 0 - normal symmetric movement   5a. Motor left arm: 0 - no drift, limb holds 90 (or 45) degrees for full 10 seconds   5b. Motor right arm: 0 - no drift, limb holds 90 (or 45) degrees for full 10 seconds   6a. Motor left le - no drift; leg holds 30 degree position for full 5 seconds   6b  Motor right le - no drift; leg holds 30 degree position for full 5 seconds   7. Limb Ataxia: 0 - absent   8. Sensory: 0 - normal; no sensory loss   9. Best Language:  0 - no aphasia, normal   10. Dysarthria: 0 - normal   11. Extinction and Inattention: 0 - no abnormality         Total:   0     MRS: 1    Labs:  Lab Results   Component Value Date    HGB 9.4 (L) 2021    WBC 8.4 2021     2021     2021    BUN 10 2021    CREATININE 0.63 2021    AST 19 2021    ALT 16 2021      Lab Results   Component Value Date    COVID19 Not Detected 2021    COVID19 Not Detected 2021    COVID19 Not Detected 2020       Imaging:  DSA 2021  --medially and inferiorly facing pseudoaneurysm at the left ICA carotid bulb, dimensions length 7.21mm, width 4.11mm, height 5.46mm. A dissection flap is seen on the inferior aspect of the lesion. Grossly stable compared to prior imaging. --Above treated with embolization with Wallstent [id number] stent. Emboshield Nav6 was used for distal embolization protection. Familia score 3.   --Hypoplastic right CFA.      Assessment:   62year old female with past medical history of hypertension, right basal ganglia stroke 2021 with no residual deficits, incidental left cervical internal carotid likely traumatic pseudoaneurysm S/p stenting 12/9/2021 yoni 3. Following procedure pt had increased bruising of the r groin and medial thigh. Pt has worse LUE weakness following the procedure, unlikely to be 2/2 stent placement. Should still r/o stroke given hx. Suspect recrudescence vs peripheral etio. R groin bruising is improved. Recommendations:    --continue asa 81 and plavix 75. plavix refilled  --check MRI brain wo con now  --check cta neck w con prior to dr. Dieudonne Adan appt, check cre prior to imaging.  --f/u Dr. Be Lazo, scheduled 4/4/2022    Case discussed with Dr. Colton Perez attending. Pt is followed by Dr. Be Lazo. Discussed use, benefit, and side effects of prescribed medications. Personally reviewed imaging with patients and all questions answered. Pt voiced understanding. Patient agreed with treatment plan. Follow up as directed above. Stefany Smith MD  Electronically signed by Stefany Smith MD, MD on 12/17/2021 at 2:49 PM     This is a telehealth visit that was performed with the originating site at patient's home and [address] [place] [address]. Patient ID verified by me prior to start of this visit. Verbal consent to participate in video visit was obtained. Pursuant to the emergency declaration under the [facility] waiver authority and the [employer] Act, this Virtual Visit was conducted, with patient's consent, to reduce the patient's risk of exposure to COVID-19 and provide continuity of care for an established/new patient. Services were provided through a video synchronous discussion virtually to substitute for in-person clinic visit.  I discussed with the patient the nature of our telehealth visits via interactive/real-time audio/video that:    - I would evaluate the patient and recommend diagnostics and treatments based on my assessment  - Our sessions are not being recorded and that personal health information is protected  - Our team would provide follow up care in person if/when the patient needs it.

## 2022-01-21 ENCOUNTER — TELEPHONE (OUTPATIENT)
Dept: FAMILY MEDICINE CLINIC | Age: 62
End: 2022-01-21

## 2022-01-21 RX ORDER — LOSARTAN POTASSIUM 50 MG/1
50 TABLET ORAL DAILY
Qty: 30 TABLET | Refills: 0 | Status: SHIPPED | OUTPATIENT
Start: 2022-01-21 | End: 2022-04-26 | Stop reason: SDUPTHER

## 2022-01-21 RX ORDER — ROSUVASTATIN CALCIUM 40 MG/1
40 TABLET, COATED ORAL DAILY
Qty: 30 TABLET | Refills: 0 | Status: SHIPPED | OUTPATIENT
Start: 2022-01-21 | End: 2022-04-26 | Stop reason: SDUPTHER

## 2022-01-21 NOTE — TELEPHONE ENCOUNTER
crestor 40 mg  Losartan 50 mg    Short supply to [address] Maintenance   Topic Date Due    Hepatitis C screen  Never done    HIV screen  Never done    DTaP/Tdap/Td vaccine (1 - Tdap) Never done    Cervical cancer screen  Never done    Colon cancer screen colonoscopy  Never done    Breast cancer screen  01/24/2022    Lipid screen  04/20/2022    Depression Screen  05/06/2022    Potassium monitoring  12/11/2022    Creatinine monitoring  12/11/2022    Flu vaccine  Completed    Shingles Vaccine  Completed    COVID-19 Vaccine  Completed    Hepatitis A vaccine  Aged Out    Hepatitis B vaccine  Aged Out    Hib vaccine  Aged Out    Meningococcal (ACWY) vaccine  Aged Out    Pneumococcal 0-64 years Vaccine  Aged Out             (applicable per patient's age: Cancer Screenings, Depression Screening, Fall Risk Screening, Immunizations)    Hemoglobin A1C (%)   Date Value   04/20/2021 5.6   01/03/2020 5.3     LDL Cholesterol (mg/dL)   Date Value   04/20/2021 113     AST (U/L)   Date Value   05/06/2021 19     ALT (U/L)   Date Value   05/06/2021 16     BUN (mg/dL)   Date Value   12/11/2021 10      (goal A1C is < 7)   (goal LDL is <100) need 30-50% reduction from baseline     BP Readings from Last 3 Encounters:   12/11/21 102/71   12/09/21 106/61   09/09/21 112/68    (goal /80)      All Future Testing planned in [facility]:  Lab Frequency Next Occurrence   CTA NECK W WO CONTRAST Once 03/28/2022   BUN & Creatinine Once 03/28/2022   MRI BRAIN WO CONTRAST Once 07/03/2022       Next Visit Date:  Future Appointments   Date Time Provider Zainab Kim   1/26/2022  2:00 PM [facility] Cathywalt Sky [address] [facility]   4/4/2022  3:00 PM Trini Levi MD Neuro Endo [id number]            Patient Active Problem List:     Chronic eczematous otitis externa of both ears     Essential hypertension     Infarction of right basal ganglia (HCC)     Pseudoaneurysm of carotid artery ([address]

## 2022-01-21 NOTE — SEDATION DOCUMENTATION
Explained to pt that he found a pseudo aneurysm and what it was FAMILY HISTORY:  Mother  Still living? No  Family history of stroke (cerebrovascular), Age at diagnosis: Age Unknown

## 2022-01-26 ENCOUNTER — HOSPITAL ENCOUNTER (OUTPATIENT)
Dept: MRI IMAGING | Age: 62
Discharge: HOME | End: 2022-01-28
Payer: COMMERCIAL

## 2022-01-26 DIAGNOSIS — I72.0 PSEUDOANEURYSM OF CAROTID ARTERY (HCC): ICD-10-CM

## 2022-01-26 PROCEDURE — 70551 MRI BRAIN STEM W/O DYE: CPT

## 2022-02-22 ENCOUNTER — TELEPHONE (OUTPATIENT)
Dept: FAMILY MEDICINE CLINIC | Age: 62
End: 2022-02-22

## 2022-02-24 ENCOUNTER — OFFICE VISIT (OUTPATIENT)
Dept: FAMILY MEDICINE CLINIC | Age: 62
End: 2022-02-24
Payer: COMMERCIAL

## 2022-02-24 ENCOUNTER — HOSPITAL ENCOUNTER (OUTPATIENT)
Age: 62
Discharge: HOME | End: 2022-02-24
Payer: COMMERCIAL

## 2022-02-24 VITALS
DIASTOLIC BLOOD PRESSURE: 74 MMHG | HEART RATE: 99 BPM | BODY MASS INDEX: 19.93 KG/M2 | SYSTOLIC BLOOD PRESSURE: 138 MMHG | HEIGHT: 66 IN | OXYGEN SATURATION: 98 % | WEIGHT: 124 LBS

## 2022-02-24 DIAGNOSIS — I63.9 INFARCTION OF RIGHT BASAL GANGLIA (HCC): ICD-10-CM

## 2022-02-24 DIAGNOSIS — Z12.12 SCREENING FOR COLORECTAL CANCER: ICD-10-CM

## 2022-02-24 DIAGNOSIS — M25.512 CHRONIC LEFT SHOULDER PAIN: ICD-10-CM

## 2022-02-24 DIAGNOSIS — F17.210 CIGARETTE SMOKER: ICD-10-CM

## 2022-02-24 DIAGNOSIS — H66.001 NON-RECURRENT ACUTE SUPPURATIVE OTITIS MEDIA OF RIGHT EAR WITHOUT SPONTANEOUS RUPTURE OF TYMPANIC MEMBRANE: ICD-10-CM

## 2022-02-24 DIAGNOSIS — G89.29 CHRONIC LEFT SHOULDER PAIN: ICD-10-CM

## 2022-02-24 DIAGNOSIS — K27.9 PEPTIC ULCER DISEASE: ICD-10-CM

## 2022-02-24 DIAGNOSIS — Z12.31 VISIT FOR SCREENING MAMMOGRAM: ICD-10-CM

## 2022-02-24 DIAGNOSIS — Z11.59 ENCOUNTER FOR HEPATITIS C SCREENING TEST FOR LOW RISK PATIENT: ICD-10-CM

## 2022-02-24 DIAGNOSIS — I10 ESSENTIAL HYPERTENSION: Primary | ICD-10-CM

## 2022-02-24 DIAGNOSIS — H60.8X3 CHRONIC ECZEMATOUS OTITIS EXTERNA OF BOTH EARS: ICD-10-CM

## 2022-02-24 DIAGNOSIS — Z12.11 SCREENING FOR COLORECTAL CANCER: ICD-10-CM

## 2022-02-24 LAB
ABSOLUTE EOS #: 0.5 K/UL (ref 0–0.4)
ABSOLUTE LYMPH #: 2.1 K/UL (ref 1–4.8)
ABSOLUTE MONO #: 0.8 K/UL (ref 0–1)
ALBUMIN SERPL-MCNC: 4.7 G/DL (ref 3.5–5.2)
ALP BLD-CCNC: 101 U/L (ref 35–104)
ALT SERPL-CCNC: 29 U/L (ref 5–33)
ANION GAP SERPL CALCULATED.3IONS-SCNC: 10 MMOL/L (ref 9–17)
AST SERPL-CCNC: 30 U/L
BASOPHILS # BLD: 0 % (ref 0–2)
BASOPHILS ABSOLUTE: 0 K/UL (ref 0–0.2)
BILIRUB SERPL-MCNC: 0.31 MG/DL (ref 0.3–1.2)
BUN BLDV-MCNC: 17 MG/DL (ref 8–23)
BUN/CREAT BLD: 19 (ref 9–20)
CALCIUM SERPL-MCNC: 9.5 MG/DL (ref 8.6–10.4)
CHLORIDE BLD-SCNC: 100 MMOL/L (ref 98–107)
CHOLESTEROL/HDL RATIO: 2.4
CHOLESTEROL: 129 MG/DL
CO2: 27 MMOL/L (ref 20–31)
CREAT SERPL-MCNC: 0.91 MG/DL (ref 0.5–0.9)
DIFFERENTIAL TYPE: YES
EOSINOPHILS RELATIVE PERCENT: 8 % (ref 0–5)
GFR AFRICAN AMERICAN: >60 ML/MIN
GFR NON-AFRICAN AMERICAN: >60 ML/MIN
GFR SERPL CREATININE-BSD FRML MDRD: ABNORMAL ML/MIN/{1.73_M2}
GLUCOSE BLD-MCNC: 89 MG/DL (ref 70–99)
HCT VFR BLD CALC: 38.2 % (ref 36–46)
HDLC SERPL-MCNC: 53 MG/DL
HEMOGLOBIN: 13 G/DL (ref 12–16)
HEPATITIS C ANTIBODY: NONREACTIVE
LDL CHOLESTEROL: 56 MG/DL (ref 0–130)
LYMPHOCYTES # BLD: 30 % (ref 15–40)
MCH RBC QN AUTO: 29 PG (ref 26–34)
MCHC RBC AUTO-ENTMCNC: 34 G/DL (ref 31–37)
MCV RBC AUTO: 85.3 FL (ref 80–100)
MONOCYTES # BLD: 11 % (ref 4–8)
PATIENT FASTING?: YES
PDW BLD-RTO: 14.5 % (ref 12.1–15.2)
PLATELET # BLD: 366 K/UL (ref 140–450)
POTASSIUM SERPL-SCNC: 4.5 MMOL/L (ref 3.7–5.3)
RBC # BLD: 4.47 M/UL (ref 4–5.2)
SEG NEUTROPHILS: 51 % (ref 47–75)
SEGMENTED NEUTROPHILS ABSOLUTE COUNT: 3.5 K/UL (ref 2.5–7)
SODIUM BLD-SCNC: 137 MMOL/L (ref 135–144)
TOTAL PROTEIN: 7.7 G/DL (ref 6.4–8.3)
TRIGL SERPL-MCNC: 100 MG/DL
WBC # BLD: 6.9 K/UL (ref 3.5–11)

## 2022-02-24 PROCEDURE — 36415 COLL VENOUS BLD VENIPUNCTURE: CPT

## 2022-02-24 PROCEDURE — 3017F COLORECTAL CA SCREEN DOC REV: CPT | Performed by: FAMILY MEDICINE

## 2022-02-24 PROCEDURE — 80053 COMPREHEN METABOLIC PANEL: CPT

## 2022-02-24 PROCEDURE — 86803 HEPATITIS C AB TEST: CPT

## 2022-02-24 PROCEDURE — G8420 CALC BMI NORM PARAMETERS: HCPCS | Performed by: FAMILY MEDICINE

## 2022-02-24 PROCEDURE — 1036F TOBACCO NON-USER: CPT | Performed by: FAMILY MEDICINE

## 2022-02-24 PROCEDURE — 85025 COMPLETE CBC W/AUTO DIFF WBC: CPT

## 2022-02-24 PROCEDURE — G8427 DOCREV CUR MEDS BY ELIG CLIN: HCPCS | Performed by: FAMILY MEDICINE

## 2022-02-24 PROCEDURE — G8482 FLU IMMUNIZE ORDER/ADMIN: HCPCS | Performed by: FAMILY MEDICINE

## 2022-02-24 PROCEDURE — 80061 LIPID PANEL: CPT

## 2022-02-24 PROCEDURE — 99214 OFFICE O/P EST MOD 30 MIN: CPT | Performed by: FAMILY MEDICINE

## 2022-02-24 RX ORDER — CETIRIZINE HYDROCHLORIDE 10 MG/1
10 TABLET ORAL DAILY
Qty: 30 TABLET | Refills: 0 | Status: SHIPPED | OUTPATIENT
Start: 2022-02-24 | End: 2022-03-26

## 2022-02-24 RX ORDER — AMOXICILLIN 875 MG/1
875 TABLET, COATED ORAL 2 TIMES DAILY
Qty: 14 TABLET | Refills: 0 | Status: SHIPPED | OUTPATIENT
Start: 2022-02-24 | End: 2022-03-03

## 2022-02-24 SDOH — ECONOMIC STABILITY: FOOD INSECURITY: WITHIN THE PAST 12 MONTHS, YOU WORRIED THAT YOUR FOOD WOULD RUN OUT BEFORE YOU GOT MONEY TO BUY MORE.: NEVER TRUE

## 2022-02-24 SDOH — ECONOMIC STABILITY: FOOD INSECURITY: WITHIN THE PAST 12 MONTHS, THE FOOD YOU BOUGHT JUST DIDN'T LAST AND YOU DIDN'T HAVE MONEY TO GET MORE.: NEVER TRUE

## 2022-02-24 ASSESSMENT — SOCIAL DETERMINANTS OF HEALTH (SDOH): HOW HARD IS IT FOR YOU TO PAY FOR THE VERY BASICS LIKE FOOD, HOUSING, MEDICAL CARE, AND HEATING?: NOT HARD AT ALL

## 2022-02-24 ASSESSMENT — PATIENT HEALTH QUESTIONNAIRE - PHQ9
SUM OF ALL RESPONSES TO PHQ QUESTIONS 1-9: 0
2. FEELING DOWN, DEPRESSED OR HOPELESS: 0
SUM OF ALL RESPONSES TO PHQ QUESTIONS 1-9: 0
SUM OF ALL RESPONSES TO PHQ QUESTIONS 1-9: 0
SUM OF ALL RESPONSES TO PHQ9 QUESTIONS 1 & 2: 0
1. LITTLE INTEREST OR PLEASURE IN DOING THINGS: 0
SUM OF ALL RESPONSES TO PHQ QUESTIONS 1-9: 0

## 2022-02-24 NOTE — PATIENT INSTRUCTIONS
SURVEY:    You may be receiving a survey from [employer] regarding your visit today. You may get this in the mail, through your MyChart or in your email. Please complete the survey to enable us to provide the highest quality of care to you and your family. If you cannot score us as very good ( 5 Stars) on any question, please feel free to call the office to discuss how we could have made your experience exceptional.     Thank you. Clinical Care Team:  DO Torito Damon, [address] [place] [address] Team:  [facility]    Patient Education        Healthy Upper Back: Exercises  Introduction  Here are some examples of exercises for your upper back. Start each exercise slowly. Ease off the exercise if you start to have pain. Your doctor or physical therapist will tell you when you can start these exercises and which ones will work best for you. How to do the exercises  Lower neck and upper back stretch    1. Stretch your arms out in front of your body. Clasp one hand on top of your other hand. 2. Gently reach out so that you feel your shoulder blades stretching away from each other. 3. Gently bend your head forward. 4. Hold for 15 to 30 seconds. 5. Repeat 2 to 4 times. Midback stretch    If you have knee pain, do not do this exercise. 1. Kneel on the floor, and sit back on your ankles. 2. Lean forward, place your hands on the floor, and stretch your arms out in front of you. Rest your head between your arms. 3. Gently push your chest toward the floor, reaching as far in front of you as possible. 4. Hold for 15 to 30 seconds. 5. Repeat 2 to 4 times. Shoulder rolls    1. Sit comfortably with your feet shoulder-width apart. You can also do this exercise while standing. 2. Roll your shoulders up, then back, and then down in a smooth, circular motion.   3. Repeat 2 to 4 times. Wall push-up    1. Stand against a wall with your feet about 12 to 24 inches back from the wall. If you feel any pain when you do this exercise, stand closer to the wall. 2. Place your hands on the wall slightly wider apart than your shoulders, and lean forward. 3. Gently lean your body toward the wall. Then push back to your starting position. Keep the motion smooth and controlled. 4. Repeat 8 to 12 times. Resisted shoulder blade squeeze    For this exercise, you will need elastic exercise material, such as surgical tubing or Thera-Band. 1. Sit or stand, holding the band in both hands in front of you. Keep your elbows close to your sides, bent at a 90-degree angle. Your palms should face up. 2. Squeeze your shoulder blades together, and move your arms to the outside, stretching the band. Be sure to keep your elbows at your sides while you do this. 3. Relax. 4. Repeat 8 to 12 times. Resisted rows    For this exercise, you will need elastic exercise material, such as surgical tubing or Thera-Band. 1. Put the band around a solid object, such as a bedpost, at about waist level. Hold one end of the band in each hand. 2. With your elbows at your sides and bent to 90 degrees, pull the band back to move your shoulder blades toward each other. Return to the starting position. 3. Repeat 8 to 12 times. Follow-up care is a key part of your treatment and safety. Be sure to make and go to all appointments, and call your doctor if you are having problems. It's also a good idea to know your test results and keep a list of the medicines you take. Where can you learn more? Go to https://[employer] a.[employer]. org and sign in to your [employer] account. Enter S469 in the [employer] box to learn more about \"Healthy Upper Back: Exercises. \"     If you do not have an account, please click on the \"Sign Up Now\" link.   Current as of: July 1, 2021               Content Version: 13.1  © 9487-7896 Healthwise, Incorporated. Care instructions adapted under license by [facility] ([facility]). If you have questions about a medical condition or this instruction, always ask your healthcare professional. [address] any warranty or liability for your use of this information.

## 2022-02-24 NOTE — PROGRESS NOTES
Name: José Miguel Elkins  : 1960         Chief Complaint:     Chief Complaint   Patient presents with    Hypertension    URI     cough, congestion, ear pain, fatigue. all for 3 weeks. boyfriend had COVID 1 month ago       History of Present Illness:      José Miguel Elkins is a 62 y.o.  female who presents with Hypertension and URI (cough, congestion, ear pain, fatigue. all for 3 weeks. boyfriend had COVID 1 month ago)      HPI    Boyfriend had COVID a month ago and a few days later pt got fatigue, sniffles, head cold. Sniffing persisted and she's developed a cough also and L submandibular pain. R ear bothering her, using topical steroid. Slipped back into smoking more, had been down to 1-2 a day. Now up to 5 a day but not smoking the whole cigarette. L shoulder pain ongoing, not doing home exercises. Concerned about neck and back also. Pain but no radiation. Has to go back to work  or will lose job. H/o stroke, pseudoaneurysm which was treated surgically in December. Continues meds as directed. H/o PUD, stomach feeling ok, no nsaid's. Continues Protonix. Medical History:     Patient Active Problem List   Diagnosis    Chronic eczematous otitis externa of both ears    Essential hypertension    Infarction of right basal ganglia (HCC)    Pseudoaneurysm of carotid artery (HCC)       Medications:       Prior to Admission medications    Medication Sig Start Date End Date Taking?  Authorizing Provider   nicotine (NICODERM CQ) 7 MG/24HR Place 1 patch onto the skin daily 22  Yes Oleta Organ, DO   amoxicillin (AMOXIL) 875 MG tablet Take 1 tablet by mouth 2 times daily for 7 days 2/24/22 3/3/22 Yes Oleta Organ, DO   cetirizine (ZYRTEC) 10 MG tablet Take 1 tablet by mouth daily 2/24/22 3/26/22 Yes Oleta Organ, DO   rosuvastatin (CRESTOR) 40 MG tablet Take 1 tablet by mouth daily 22  Yes Oleta Organ, DO   losartan (COZAAR) 50 MG tablet Take 1 tablet by mouth daily 1/21/22  Yes Alma Rosa Murray, DO   clopidogrel (PLAVIX) 75 MG tablet Take 1 tablet by mouth daily 12/17/21  Yes Sabine Hirsch MD   amLODIPine (NORVASC) 10 MG tablet Take 1 tablet by mouth daily 10/18/21  Yes Alma Rosa Murray, DO   pantoprazole (PROTONIX) 40 MG tablet Take 1 tablet by mouth daily 10/18/21  Yes Alma Rosa Murray, DO   aspirin EC 81 MG EC tablet Take 1 tablet by mouth daily 10/18/21  Yes Alma Rosa Murray, DO   fluticasone (FLONASE) 50 MCG/ACT nasal spray 2 sprays by Each Nostril route daily 7/29/21  Yes Alma Rosa Murray, DO   triamcinolone (KENALOG) 0.1 % ointment Apply topically 2 times daily 7/29/21  Yes Alma Rosa Murray, DO   fluocinolone (DERMOTIC) 0.01 % OIL oil Put 4 drops into the affected ear(s) twice daily x 2 weeks. After that can decrease use to 1-2 times a week for maintenance. 1/4/21  Yes AGNES Harrell   Probiotic Product (PROBIOTIC-10) CAPS Take by mouth daily   Yes Historical Provider, MD   Multiple Vitamin (MULTI-VITAMIN DAILY PO) Take by mouth   Yes Historical Provider, MD   Ascorbic Acid (VITAMIN C) 250 MG tablet Take 250 mg by mouth daily   Yes Historical Provider, MD   Cholecalciferol (VITAMIN D3) 2000 units CAPS Take by mouth   Yes Historical Provider, MD   acetaminophen (TYLENOL) 325 MG tablet Take 325 mg by mouth every 6 hours as needed for Pain   Yes Historical Provider, MD        Allergies: Other    Physical Exam:     Vitals:  /74 Comment: took cold medicine this morning  Pulse 99   Ht 5' 6\" (1.676 m)   Wt 124 lb (56.2 kg)   LMP  (LMP Unknown)   SpO2 98%   BMI 20.01 kg/m²   Physical Exam  Vitals and nursing note reviewed. Constitutional:       General: She is not in acute distress. Appearance: She is well-developed. HENT:      Head: Normocephalic and atraumatic.       Ears:      Comments: jason eczematous changes of EAC and EAM. R TM slight bulging, white opaque effusion     Nose: Nose normal.   Eyes:      Conjunctiva/sclera: Conjunctivae normal.   Cardiovascular:      Rate and Rhythm: Normal rate and regular rhythm. Heart sounds: Normal heart sounds. Pulmonary:      Effort: Pulmonary effort is normal.      Breath sounds: Normal breath sounds. Musculoskeletal:      Cervical back: Neck supple. Comments: L shoulder normal ROM and strength   Lymphadenopathy:      Cervical: No cervical adenopathy. Skin:     General: Skin is warm and dry. Neurological:      Mental Status: She is alert and oriented to person, place, and time. Psychiatric:         Mood and Affect: Mood normal.         Behavior: Behavior normal.         Judgment: Judgment normal.         Data:     Lab Results   Component Value Date     02/24/2022    K 4.5 02/24/2022     02/24/2022    CO2 27 02/24/2022    BUN 17 02/24/2022    CREATININE 0.91 02/24/2022    GLUCOSE 89 02/24/2022    PROT 7.7 02/24/2022    LABALBU 4.7 02/24/2022    BILITOT 0.31 02/24/2022    ALKPHOS 101 02/24/2022    AST 30 02/24/2022    ALT 29 02/24/2022     Lab Results   Component Value Date    WBC 6.9 02/24/2022    RBC 4.47 02/24/2022    HGB 13.0 02/24/2022    HCT 38.2 02/24/2022    MCV 85.3 02/24/2022    MCH 29.0 02/24/2022    MCHC 34.0 02/24/2022    RDW 14.5 02/24/2022     02/24/2022    MPV 9.0 12/11/2021     Lab Results   Component Value Date    TSH 2.05 04/19/2021     Lab Results   Component Value Date    CHOL 129 02/24/2022    HDL 53 02/24/2022    LABA1C 5.6 04/20/2021         Assessment & Plan:        Diagnosis Orders   1. Essential hypertension     2. Infarction of right basal ganglia (HCC)  Comprehensive Metabolic Panel    Lipid Panel   3. Peptic ulcer disease  CBC with Auto Differential   4. Chronic left shoulder pain     5. Chronic eczematous otitis externa of both ears     6. Cigarette smoker     7. Visit for screening mammogram  DENNIS APURVA DIGITAL SCREEN BILATERAL   8. Screening for colorectal cancer  Fecal DNA Colorectal cancer screening (Cologuard)   9.  Encounter for hepatitis C screening test for low risk patient  Hepatitis C Antibody   10. Non-recurrent acute suppurative otitis media of right ear without spontaneous rupture of tympanic membrane     1. HTn controlled, cont current rx  2. H/o stroke. S/p procedure on carotid pseudoaneurysm. Cont statin, DAPT, BP meds. Work on smoking cessation - nicotine patches. 3. H/o PUD, cont PPI  4. Chronic L shoulder pain, needs to restart home exercises but ok to go back to work  5. Cont topical steroid  6. Nicotine patches. amox and zyrtec for URI symptoms, mild R otitis. Requested Prescriptions     Signed Prescriptions Disp Refills    nicotine (NICODERM CQ) 7 MG/24HR 42 patch 0     Sig: Place 1 patch onto the skin daily    amoxicillin (AMOXIL) 875 MG tablet 14 tablet 0     Sig: Take 1 tablet by mouth 2 times daily for 7 days    cetirizine (ZYRTEC) 10 MG tablet 30 tablet 0     Sig: Take 1 tablet by mouth daily         Patient Instructions   SURVEY:    You may be receiving a survey from [employer] regarding your visit today. You may get this in the mail, through your MyChart or in your email. Please complete the survey to enable us to provide the highest quality of care to you and your family. If you cannot score us as very good ( 5 Stars) on any question, please feel free to call the office to discuss how we could have made your experience exceptional.     Thank you. Clinical Care Team:  Dr. Fely Rincon DO                                           [facility], [address] [place] [address] Team:  [facility]    Patient Education        Healthy Upper Back: Exercises  Introduction  Here are some examples of exercises for your upper back. Start each exercise slowly. Ease off the exercise if you start to have pain. Your doctor or physical therapist will tell you when you can start these exercises and which ones will work best for you.   How to do the exercises  Lower neck and upper back stretch    1. Stretch your arms out in front of your body. Clasp one hand on top of your other hand. 2. Gently reach out so that you feel your shoulder blades stretching away from each other. 3. Gently bend your head forward. 4. Hold for 15 to 30 seconds. 5. Repeat 2 to 4 times. Midback stretch    If you have knee pain, do not do this exercise. 1. Kneel on the floor, and sit back on your ankles. 2. Lean forward, place your hands on the floor, and stretch your arms out in front of you. Rest your head between your arms. 3. Gently push your chest toward the floor, reaching as far in front of you as possible. 4. Hold for 15 to 30 seconds. 5. Repeat 2 to 4 times. Shoulder rolls    1. Sit comfortably with your feet shoulder-width apart. You can also do this exercise while standing. 2. Roll your shoulders up, then back, and then down in a smooth, circular motion. 3. Repeat 2 to 4 times. Wall push-up    1. Stand against a wall with your feet about 12 to 24 inches back from the wall. If you feel any pain when you do this exercise, stand closer to the wall. 2. Place your hands on the wall slightly wider apart than your shoulders, and lean forward. 3. Gently lean your body toward the wall. Then push back to your starting position. Keep the motion smooth and controlled. 4. Repeat 8 to 12 times. Resisted shoulder blade squeeze    For this exercise, you will need elastic exercise material, such as surgical tubing or Thera-Band. 1. Sit or stand, holding the band in both hands in front of you. Keep your elbows close to your sides, bent at a 90-degree angle. Your palms should face up. 2. Squeeze your shoulder blades together, and move your arms to the outside, stretching the band. Be sure to keep your elbows at your sides while you do this. 3. Relax. 4. Repeat 8 to 12 times.   Resisted rows    For this exercise, you will need elastic exercise material, such as surgical tubing or Thera-Band. 1. Put the band around a solid object, such as a bedpost, at about waist level. Hold one end of the band in each hand. 2. With your elbows at your sides and bent to 90 degrees, pull the band back to move your shoulder blades toward each other. Return to the starting position. 3. Repeat 8 to 12 times. Follow-up care is a key part of your treatment and safety. Be sure to make and go to all appointments, and call your doctor if you are having problems. It's also a good idea to know your test results and keep a list of the medicines you take. Where can you learn more? Go to https://[employer] inTouch.[employer]. org and sign in to your [employer] account. Enter Q239 in the [employer] box to learn more about \"Healthy Upper Back: Exercises. \"     If you do not have an account, please click on the \"Sign Up Now\" link. Current as of: July 1, 2021               Content Version: 13.1  © 2006-2021 Healthwise, Incorporated. Care instructions adapted under license by [facility] ([facility]). If you have questions about a medical condition or this instruction, always ask your healthcare professional. [place] [address] any warranty or liability for your use of this information. Ese Rust received counseling on the following healthy behaviors: medication adherence and tobacco cessation  Reviewed prior labs and health maintenance. Continue current medications, diet and exercise. Discussed use, benefit, and side effects of prescribed medications. Barriers to medication compliance addressed. Patient given educational materials - see patient instructions. All patient questions answered.   Patient voiced understanding.     signed by Shaan Chacon DO on 2/27/2022 at 5:33 PM  [address]  Dept: [phone]

## 2022-03-30 RX ORDER — CLOPIDOGREL BISULFATE 75 MG/1
75 TABLET ORAL DAILY
Qty: 30 TABLET | Refills: 2 | Status: SHIPPED | OUTPATIENT
Start: 2022-03-30 | End: 2022-04-04 | Stop reason: ALTCHOICE

## 2022-04-01 ENCOUNTER — HOSPITAL ENCOUNTER (OUTPATIENT)
Dept: CT IMAGING | Age: 62
Discharge: HOME | End: 2022-04-03
Payer: COMMERCIAL

## 2022-04-01 DIAGNOSIS — I72.0 PSEUDOANEURYSM OF CAROTID ARTERY (HCC): ICD-10-CM

## 2022-04-01 DIAGNOSIS — I72.0 PSEUDOANEURYSM OF CAROTID ARTERY (HCC): Primary | ICD-10-CM

## 2022-04-01 LAB
BUN BLDV-MCNC: 18 MG/DL (ref 8–23)
CREAT SERPL-MCNC: 0.96 MG/DL (ref 0.5–0.9)
GFR AFRICAN AMERICAN: >60 ML/MIN
GFR NON-AFRICAN AMERICAN: 59 ML/MIN
GFR SERPL CREATININE-BSD FRML MDRD: ABNORMAL ML/MIN/{1.73_M2}

## 2022-04-01 PROCEDURE — 70498 CT ANGIOGRAPHY NECK: CPT

## 2022-04-01 PROCEDURE — 84520 ASSAY OF UREA NITROGEN: CPT

## 2022-04-01 PROCEDURE — 6360000004 HC RX CONTRAST MEDICATION: Performed by: STUDENT IN AN ORGANIZED HEALTH CARE EDUCATION/TRAINING PROGRAM

## 2022-04-01 PROCEDURE — 36415 COLL VENOUS BLD VENIPUNCTURE: CPT

## 2022-04-01 PROCEDURE — 82565 ASSAY OF CREATININE: CPT

## 2022-04-01 RX ADMIN — IOPAMIDOL 100 ML: 755 INJECTION, SOLUTION INTRAVENOUS at 11:59

## 2022-04-04 ENCOUNTER — TELEMEDICINE (OUTPATIENT)
Dept: NEUROLOGY | Age: 62
End: 2022-04-04
Payer: COMMERCIAL

## 2022-04-04 DIAGNOSIS — Z98.890 HISTORY OF LEFT COMMON CAROTID ARTERY STENT PLACEMENT: Primary | ICD-10-CM

## 2022-04-04 DIAGNOSIS — Z95.828 HISTORY OF LEFT COMMON CAROTID ARTERY STENT PLACEMENT: Primary | ICD-10-CM

## 2022-04-04 PROCEDURE — G8428 CUR MEDS NOT DOCUMENT: HCPCS | Performed by: PSYCHIATRY & NEUROLOGY

## 2022-04-04 PROCEDURE — 99215 OFFICE O/P EST HI 40 MIN: CPT | Performed by: PSYCHIATRY & NEUROLOGY

## 2022-04-04 PROCEDURE — G8420 CALC BMI NORM PARAMETERS: HCPCS | Performed by: PSYCHIATRY & NEUROLOGY

## 2022-04-04 PROCEDURE — 1036F TOBACCO NON-USER: CPT | Performed by: PSYCHIATRY & NEUROLOGY

## 2022-04-04 PROCEDURE — 3017F COLORECTAL CA SCREEN DOC REV: CPT | Performed by: PSYCHIATRY & NEUROLOGY

## 2022-04-04 NOTE — PROGRESS NOTES
Neurocritical Care, Stroke & Neurointerventional Note    [facility] Wall    [facility],  [address] [facility], [address] [place] [address]   P: [phone]  Endovascular Neurosurgery Consult    Pt Name: Meredith Sanchez  MRN: [id number]  [place]: 1960  Date of evaluation: 4/4/2022  Primary Care Physician: Claire Hutchinson DO    Reason for evaluation: right basal     SUBJECTIVE:   History of Chief Complaint:    Meredith Sanchez is a 62 y.o. female who presents with right basal ganglia stroke presenting with left sided weakness, she back to the pre-stroke baseline status    Irregular superiorly projecting outpouching along the medial proximal left    ICA measures approximately 0.4 x 0.3 and could represent a small    pseudoaneurysm/aneurysm. 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 Medications  Prior to Admission medications    Medication Sig Start Date End Date Taking?  Authorizing Provider   clopidogrel (PLAVIX) 75 MG tablet Take 1 tablet by mouth daily 3/30/22   Kwaku Winter MD   nicotine (NICODERM CQ) 7 MG/24HR Place 1 patch onto the skin daily 2/24/22   Claire Muta, DO   rosuvastatin (CRESTOR) 40 MG tablet Take 1 tablet by mouth daily 1/21/22   Claire Muta, DO   losartan (COZAAR) 50 MG tablet Take 1 tablet by mouth daily 1/21/22   Claire Muta, DO   amLODIPine (NORVASC) 10 MG tablet Take 1 tablet by mouth daily 10/18/21   Claire Muta, DO   pantoprazole (PROTONIX) 40 MG tablet Take 1 tablet by mouth daily 10/18/21   Claire Muta, DO   aspirin EC 81 MG EC tablet Take 1 tablet by mouth daily 10/18/21   Claire Muta, DO   fluticasone (FLONASE) 50 MCG/ACT nasal spray 2 sprays by Each Nostril route daily 7/29/21 Lark Brittany, DO   triamcinolone (KENALOG) 0.1 % ointment Apply topically 2 times daily 21   Lark Brittany, DO   fluocinolone (DERMOTIC) 0.01 % OIL oil Put 4 drops into the affected ear(s) twice daily x 2 weeks. After that can decrease use to 1-2 times a week for maintenance. 21   AGNES Harrell   Probiotic Product (PROBIOTIC-10) CAPS Take by mouth daily    Historical Provider, MD   Multiple Vitamin (MULTI-VITAMIN DAILY PO) Take by mouth    Historical Provider, MD   Ascorbic Acid (VITAMIN C) 250 MG tablet Take 250 mg by mouth daily    Historical Provider, MD   Cholecalciferol (VITAMIN D3) 2000 units CAPS Take by mouth    Historical Provider, MD   acetaminophen (TYLENOL) 325 MG tablet Take 325 mg by mouth every 6 hours as needed for Pain    Historical Provider, MD    Scheduled Meds:  Continuous Infusions:  PRN Meds:.  Past Medical History   has a past medical history of Allergic rhinitis, Bursitis of left shoulder, Essential hypertension, Pseudoaneurysm of carotid artery ([facility] [address] Transient ischemic attack, Wears dentures, and Wears glasses. Past Surgical History   has a past surgical history that includes  section (); Upper gastrointestinal endoscopy (N/A, 2021); and eye surgery (Right). Social History   reports that she quit smoking about a year ago. She has a 15.00 pack-year smoking history. She has never used smokeless tobacco.   reports no history of alcohol use. reports no history of drug use. Family History  family history is not on file.     Review of Systems:  CONSTITUTIONAL:  negative for fevers, chills, fatigue and malaise    EYES:  negative for double vision, blurred vision and photophobia     HEENT:  negative for tinnitus, epistaxis and sore throat    RESPIRATORY:  negative for cough, shortness of breath, wheezing    CARDIOVASCULAR:  negative for chest pain, palpitations, syncope, edema    GASTROINTESTINAL:  negative for nausea, vomiting    GENITOURINARY: negative for incontinence    MUSCULOSKELETAL:  negative for neck or back pain    NEUROLOGICAL:  Negative for weakness and tingling  negative for headaches and dizziness    PSYCHIATRIC:  negative for anxiety      Review of systems otherwise negative. OBJECTIVE:   Vitals: LMP  (LMP Unknown)      Virtual Visit   No Vital signs    BP at home beloiw 130/80 mm Hg  General appearance: Lying in bed, NAD  HEENT: Head: Normocephalic, no lesions, without obvious abnormality. Neurologic: Mental status: Alert, oriented, thought content appropriate, Alert and oriented x 3,   Language/speech: intact language, attention, knowledge  CN: II-XII intact  MOTOR: moves all 4 extremities     LABS:   No results for input(s): WBC, HGB, HCT, PLT, NA, K, CL, CO2, BUN, CREATININE, MG, PHOS, CALCIUM, PTT, INR, AST, ALT, BILITOT, BILIDIR, NITRU, COLORU, BACTERIA in the last 72 hours. Invalid input(s): PT, WBCU, RBCU, LEUKOCYTESUA  No results for input(s): ALKPHOS, ALT, AST, BILITOT, BILIDIR, LABALBU, AMYLASE, LIPASE in the last 72 hours. RADIOLOGY:   Images were personally reviewed including:    CTA of the neck on 4/1/2022  Stent is patent, the aneurysm is occluded  And the bilateral carotid < 50% stenosis     IMPRESSIONS:   Catarina New is a 62 y.o. female who presents with right basal ganglia stroke presenting with left sided weakness, she back to the pre-stroke baseline status    Irregular superiorly projecting outpouching along the medial proximal left    ICA measures approximately 0.4 x 0.3 and could represent a small    pseudoaneurysm/aneurysm. She is referred for further evaluation and management if her left ICA aneurysm  Very ulcerated plaque at risk of embolization. 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

## 2022-04-19 ENCOUNTER — OFFICE VISIT (OUTPATIENT)
Dept: FAMILY MEDICINE CLINIC | Age: 62
End: 2022-04-19
Payer: COMMERCIAL

## 2022-04-19 VITALS
OXYGEN SATURATION: 98 % | DIASTOLIC BLOOD PRESSURE: 60 MMHG | BODY MASS INDEX: 19.69 KG/M2 | TEMPERATURE: 98.1 F | HEART RATE: 88 BPM | SYSTOLIC BLOOD PRESSURE: 130 MMHG | WEIGHT: 122 LBS

## 2022-04-19 DIAGNOSIS — M67.911 DYSFUNCTION OF RIGHT ROTATOR CUFF: Primary | ICD-10-CM

## 2022-04-19 DIAGNOSIS — J30.9 ALLERGIC RHINITIS, UNSPECIFIED SEASONALITY, UNSPECIFIED TRIGGER: ICD-10-CM

## 2022-04-19 DIAGNOSIS — R09.81 SINUS CONGESTION: ICD-10-CM

## 2022-04-19 PROCEDURE — 1036F TOBACCO NON-USER: CPT | Performed by: NURSE PRACTITIONER

## 2022-04-19 PROCEDURE — 3017F COLORECTAL CA SCREEN DOC REV: CPT | Performed by: NURSE PRACTITIONER

## 2022-04-19 PROCEDURE — G8427 DOCREV CUR MEDS BY ELIG CLIN: HCPCS | Performed by: NURSE PRACTITIONER

## 2022-04-19 PROCEDURE — 99213 OFFICE O/P EST LOW 20 MIN: CPT | Performed by: NURSE PRACTITIONER

## 2022-04-19 PROCEDURE — G8420 CALC BMI NORM PARAMETERS: HCPCS | Performed by: NURSE PRACTITIONER

## 2022-04-19 ASSESSMENT — ENCOUNTER SYMPTOMS
SORE THROAT: 0
SWOLLEN GLANDS: 0
COUGH: 0
NAUSEA: 0
SINUS PAIN: 1
DIARRHEA: 0
VOMITING: 0
RHINORRHEA: 1
SHORTNESS OF BREATH: 0

## 2022-04-19 NOTE — LETTER
[facility] PR[facility] LILA Guevara [address] [place] [id number]  Phone: [phone]  Fax: [place] [phone], APRN - CNP        April 19, 2022     Patient: Andreina Bhatti   YOB: 1960   Date of Visit: 4/19/2022       To Whom It May Concern: It is my medical opinion that Ismael Mg may return to work on 4/20/22. Pt was off 4/18 and 4/19 due to illness. If you have any questions or concerns, please don't hesitate to call.     Sincerely,          Ezio Carpenter, ROGELIO - CNP

## 2022-04-19 NOTE — PROGRESS NOTES
HPI Notes    Name: Phoebe Chaudhari  : 1960         Chief Complaint:     Chief Complaint   Patient presents with    Shoulder Pain     Patient here today with right shoulder pain, started 6 days ago.  URI     Patient complains of runny nose, head congestion, no fever. History of Present Illness:        Shoulder Pain   The pain is present in the right shoulder. This is a new problem. The current episode started in the past 7 days. There has been no history of extremity trauma (repetitive motion job). The problem occurs constantly. The quality of the pain is described as aching. Pertinent negatives include no fever. The symptoms are aggravated by activity. She has tried nothing for the symptoms. URI   This is a new problem. The current episode started in the past 7 days. There has been no fever. Associated symptoms include congestion, rhinorrhea and sinus pain. Pertinent negatives include no chest pain, coughing, diarrhea, ear pain, headaches, nausea, sore throat, swollen glands or vomiting.        Past Medical History:     Past Medical History:   Diagnosis Date    Allergic rhinitis     dust mites and nickel    Bursitis of left shoulder     Essential hypertension 2021    Dr. 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    Transient ischemic attack 2021    Wears dentures     Wears glasses       Reviewed all health maintenance requirements and ordered appropriate tests  Health Maintenance Due   Topic Date Due    HIV screen  Never done    DTaP/Tdap/Td vaccine (1 - Tdap) Never done    Cervical cancer screen  Never done    Colorectal Cancer Screen  Never done    Breast cancer screen  2022       Past Surgical History:     Past Surgical History:   Procedure Laterality Date   [address]    2nd in [address] [facility] [address] Right     laser on retina    UPPER GASTROINTESTINAL ENDOSCOPY N/A 2021    EGD ESOPHAGOGASTRODUODENOSCOPY WITH BIOPSIES performed by Maria Elena Dave MD at [address]        Medications:       Prior to Admission medications    Medication Sig Start Date End Date Taking? Authorizing Provider   nicotine (NICODERM CQ) 7 MG/24HR Place 1 patch onto the skin daily 2/24/22  Yes Yoly Byrnen, DO   rosuvastatin (CRESTOR) 40 MG tablet Take 1 tablet by mouth daily 1/21/22  Yes Yoly Linen, DO   losartan (COZAAR) 50 MG tablet Take 1 tablet by mouth daily 1/21/22  Yes Yoly Linen, DO   amLODIPine (NORVASC) 10 MG tablet Take 1 tablet by mouth daily 10/18/21  Yes Yoly Linen, DO   pantoprazole (PROTONIX) 40 MG tablet Take 1 tablet by mouth daily 10/18/21  Yes Yoly Lineeda, DO   aspirin EC 81 MG EC tablet Take 1 tablet by mouth daily 10/18/21  Yes Yoly Rosa, DO   triamcinolone (KENALOG) 0.1 % ointment Apply topically 2 times daily 7/29/21  Yes Yoly Rosa,    fluocinolone (DERMOTIC) 0.01 % OIL oil Put 4 drops into the affected ear(s) twice daily x 2 weeks. After that can decrease use to 1-2 times a week for maintenance. 1/4/21  Yes AGNES Harrell   Probiotic Product (PROBIOTIC-10) CAPS Take by mouth daily   Yes Historical Provider, MD   Multiple Vitamin (MULTI-VITAMIN DAILY PO) Take by mouth   Yes Historical Provider, MD   Ascorbic Acid (VITAMIN C) 250 MG tablet Take 250 mg by mouth daily   Yes Historical Provider, MD   Cholecalciferol (VITAMIN D3) 2000 units CAPS Take by mouth   Yes Historical Provider, MD   acetaminophen (TYLENOL) 325 MG tablet Take 325 mg by mouth every 6 hours as needed for Pain   Yes Historical Provider, MD   fluticasone (FLONASE) 50 MCG/ACT nasal spray 2 sprays by Each Nostril route daily  Patient not taking: Reported on 4/19/2022 7/29/21   Yoly Rosa DO        Allergies: Other    Social History:     Tobacco:    reports that she quit smoking about a year ago. She has a 15.00 pack-year smoking history.  She has never used smokeless tobacco.  Alcohol:      reports no history of alcohol use. Drug Use:  reports no history of drug use. Family History:     No family history on file. Review of Systems:         Review of Systems   Constitutional: Negative for chills and fever. HENT: Positive for congestion, rhinorrhea and sinus pain. Negative for ear pain and sore throat. Respiratory: Negative for cough and shortness of breath. Cardiovascular: Negative for chest pain and palpitations. Gastrointestinal: Negative for diarrhea, nausea and vomiting. Neurological: Negative for dizziness, seizures and headaches. Physical Exam:     Vitals:  /60   Pulse 88   Temp 98.1 °F (36.7 °C) (Oral)   Wt 122 lb (55.3 kg)   LMP  (LMP Unknown)   SpO2 98%   BMI 19.69 kg/m²       Physical Exam  Vitals and nursing note reviewed. Constitutional:       Appearance: She is well-developed. HENT:      Nose: Rhinorrhea present. Mouth/Throat:      Pharynx: Posterior oropharyngeal erythema present. No oropharyngeal exudate. Cardiovascular:      Rate and Rhythm: Normal rate and regular rhythm. Pulmonary:      Effort: Pulmonary effort is normal. No respiratory distress. Breath sounds: Normal breath sounds. Musculoskeletal:      Right shoulder: Tenderness present. Decreased range of motion. Decreased strength. Comments: +Apley scratch test right  +Hawkin's test rght   Skin:     General: Skin is warm and dry. Neurological:      Mental Status: She is alert and oriented to person, place, and time.                Data:     Lab Results   Component Value Date     02/24/2022    K 4.5 02/24/2022     02/24/2022    CO2 27 02/24/2022    BUN 18 04/01/2022    CREATININE 0.96 04/01/2022    GLUCOSE 89 02/24/2022    PROT 7.7 02/24/2022    LABALBU 4.7 02/24/2022    BILITOT 0.31 02/24/2022    ALKPHOS 101 02/24/2022    AST 30 02/24/2022    ALT 29 02/24/2022     Lab Results   Component Value Date    WBC 6.9 02/24/2022    RBC 4.47 02/24/2022    HGB 13.0 02/24/2022    HCT 38.2 02/24/2022    MCV 85.3 02/24/2022    MCH 29.0 02/24/2022    MCHC 34.0 02/24/2022    RDW 14.5 02/24/2022     02/24/2022    MPV 9.0 12/11/2021     Lab Results   Component Value Date    TSH 2.05 04/19/2021     Lab Results   Component Value Date    CHOL 129 02/24/2022    HDL 53 02/24/2022    LABA1C 5.6 04/20/2021          Assessment & Plan        Diagnosis Orders   1. Dysfunction of right rotator cuff   --symptoms consistent with rotator cuff dysfunction. Most likely from repetitive motion at her job. Pt is established with ortho (Dr Zehra Ferraro) so she will call and make an appt with him. 2. Allergic rhinitis, unspecified seasonality, unspecified trigger   --Coricidin HBP Cold and Cough 1 tab every 6 hours as needed (nasal decongestant and antihistamine)  Flonase 1 spray each nostril twice daily (nasal steroid)      3. Sinus congestion                     Completed Refills   Requested Prescriptions      No prescriptions requested or ordered in this encounter     No follow-ups on file. No orders of the defined types were placed in this encounter. No orders of the defined types were placed in this encounter. There are no Patient Instructions on file for this visit.     Electronically signed by ROGELIO Jean CNP on 4/19/2022 at 9:59 AM           Completed Refills   Requested Prescriptions      No prescriptions requested or ordered in this encounter

## 2022-04-19 NOTE — LETTER
[address] [address]  Phone: [phone]  Fax: Snow [place] [phone], APRN - CNP        April 19, 2022    [address] [place] [address]      Dear Camelia Pablo:    Coricidin HBP Cold and Cough 1 tab every 6 hours as needed (nasal decongestant and antihistamine)  Flonase 1 spray each nostril twice daily (nasal steroid)   Warm tea with 1tbsp honey (soothes the throat)  Increase water intake  Rest        If you have any questions or concerns, please don't hesitate to call.     Sincerely,          Kaylene Marroquin, APRN - CNP

## 2022-04-26 ENCOUNTER — OFFICE VISIT (OUTPATIENT)
Dept: FAMILY MEDICINE CLINIC | Age: 62
End: 2022-04-26
Payer: COMMERCIAL

## 2022-04-26 VITALS
WEIGHT: 124 LBS | OXYGEN SATURATION: 99 % | HEART RATE: 84 BPM | BODY MASS INDEX: 19.93 KG/M2 | HEIGHT: 66 IN | SYSTOLIC BLOOD PRESSURE: 128 MMHG | DIASTOLIC BLOOD PRESSURE: 60 MMHG

## 2022-04-26 DIAGNOSIS — I63.9 INFARCTION OF RIGHT BASAL GANGLIA (HCC): ICD-10-CM

## 2022-04-26 DIAGNOSIS — M79.662 BILATERAL CALF PAIN: ICD-10-CM

## 2022-04-26 DIAGNOSIS — M75.81 TENDINITIS OF RIGHT ROTATOR CUFF: Primary | ICD-10-CM

## 2022-04-26 DIAGNOSIS — I10 ESSENTIAL HYPERTENSION: ICD-10-CM

## 2022-04-26 DIAGNOSIS — M79.661 BILATERAL CALF PAIN: ICD-10-CM

## 2022-04-26 PROCEDURE — 1036F TOBACCO NON-USER: CPT | Performed by: FAMILY MEDICINE

## 2022-04-26 PROCEDURE — 3017F COLORECTAL CA SCREEN DOC REV: CPT | Performed by: FAMILY MEDICINE

## 2022-04-26 PROCEDURE — 99214 OFFICE O/P EST MOD 30 MIN: CPT | Performed by: FAMILY MEDICINE

## 2022-04-26 PROCEDURE — G8427 DOCREV CUR MEDS BY ELIG CLIN: HCPCS | Performed by: FAMILY MEDICINE

## 2022-04-26 PROCEDURE — G8420 CALC BMI NORM PARAMETERS: HCPCS | Performed by: FAMILY MEDICINE

## 2022-04-26 RX ORDER — AMLODIPINE BESYLATE 10 MG/1
10 TABLET ORAL DAILY
Qty: 90 TABLET | Refills: 3 | Status: SHIPPED | OUTPATIENT
Start: 2022-04-26

## 2022-04-26 RX ORDER — LOSARTAN POTASSIUM 50 MG/1
50 TABLET ORAL DAILY
Qty: 90 TABLET | Refills: 3 | Status: SHIPPED | OUTPATIENT
Start: 2022-04-26

## 2022-04-26 RX ORDER — ROSUVASTATIN CALCIUM 40 MG/1
40 TABLET, COATED ORAL DAILY
Qty: 90 TABLET | Refills: 3 | Status: SHIPPED | OUTPATIENT
Start: 2022-04-26

## 2022-04-26 RX ORDER — ASPIRIN 81 MG/1
81 TABLET ORAL DAILY
Qty: 90 TABLET | Refills: 3 | Status: SHIPPED | OUTPATIENT
Start: 2022-04-26

## 2022-04-26 RX ORDER — PANTOPRAZOLE SODIUM 40 MG/1
40 TABLET, DELAYED RELEASE ORAL DAILY
Qty: 90 TABLET | Refills: 3 | Status: SHIPPED | OUTPATIENT
Start: 2022-04-26

## 2022-04-26 NOTE — PROGRESS NOTES
Name: Mainor Georges  : 1960         Chief Complaint:     Chief Complaint   Patient presents with    Shoulder Pain     right side . states that she was lifting heavy parts at work. Patient states that this is not [facility].  patient was seen  for rotator cuff, she was to schedule with Dr. Lilian Franks. she has not contacted them yet.  Back Pain     right side. patient states that this is not [facility]       History of Present Illness:      Mainor Georges is a 65 y.o.  female who presents with Shoulder Pain (right side . states that she was lifting heavy parts at work. Patient states that this is not [facility].  patient was seen  for rotator cuff, she was to schedule with Dr. Lilian Franks. she has not contacted them yet. ) and Back Pain (right side. patient states that this is not [facility])      HPI    Pt c/o R shoulder pain. Significant h/o L shoulder pathology for which she was off work for several mos. She went back to work earlier this month and had to go off again after a couple days d/t jason shoulder pain. 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 R shoulder causing the most pain now, posterior shoulder, aching. Doing home exercises she had done for the L shoulder. Using [facility] which helps. Trouble walking d/t pain in both calves, notices with walking from press room to a different area at work or with walking from work building to her vehicle. Better at rest. Had been quite inactive while she was off work. HTN and h/o stroke, no recurrent or residual symptoms. Taking meds as directed. Pt wonders whether she'll be able to continue working, whether she should go on disability, if she's making anything worse by working.     Medical History:     Patient Active Problem List   Diagnosis    Chronic eczematous otitis externa of both ears    Essential hypertension    Infarction of right basal ganglia (HCC)    Pseudoaneurysm of carotid artery (HCC)       Medications:       Prior to Admission medications    Medication Sig Start Date End Date Taking? Authorizing Provider   amLODIPine (NORVASC) 10 MG tablet Take 1 tablet by mouth daily 4/26/22  Yes Lindsay Buenrostro DO   pantoprazole (PROTONIX) 40 MG tablet Take 1 tablet by mouth daily 4/26/22  Yes Lindsay Buenrostro DO   aspirin EC 81 MG EC tablet Take 1 tablet by mouth daily 4/26/22  Yes Lindsay Buenrostro DO   losartan (COZAAR) 50 MG tablet Take 1 tablet by mouth daily 4/26/22  Yes Lindsay Buenrostro DO   rosuvastatin (CRESTOR) 40 MG tablet Take 1 tablet by mouth daily 4/26/22  Yes Lindsay Buenrostro DO   ciclopirox (PENLAC) 8 % solution Apply topically nightly. 4/26/22  Yes Lindsay Buenrostro DO   fluticasone (FLONASE) 50 MCG/ACT nasal spray 2 sprays by Each Nostril route daily 7/29/21  Yes Lindsay Buenrostro DO   triamcinolone (KENALOG) 0.1 % ointment Apply topically 2 times daily 7/29/21  Yes Lindsay Buenrostro DO   fluocinolone (DERMOTIC) 0.01 % OIL oil Put 4 drops into the affected ear(s) twice daily x 2 weeks. After that can decrease use to 1-2 times a week for maintenance. 1/4/21  Yes AGNES Harrell   Probiotic Product (PROBIOTIC-10) CAPS Take by mouth daily   Yes Historical Provider, MD   Multiple Vitamin (MULTI-VITAMIN DAILY PO) Take by mouth   Yes Historical Provider, MD   Ascorbic Acid (VITAMIN C) 250 MG tablet Take 250 mg by mouth daily   Yes Historical Provider, MD   Cholecalciferol (VITAMIN D3) 2000 units CAPS Take by mouth   Yes Historical Provider, MD   acetaminophen (TYLENOL) 325 MG tablet Take 325 mg by mouth every 6 hours as needed for Pain   Yes Historical Provider, MD        Allergies: Other    Physical Exam:     Vitals:  /60   Pulse 84   Ht 5' 6\" (1.676 m)   Wt 124 lb (56.2 kg)   LMP  (LMP Unknown)   SpO2 99%   BMI 20.01 kg/m²   Physical Exam  Vitals and nursing note reviewed.    Constitutional: General: She is not in acute distress. Appearance: Normal appearance. She is well-developed. She is not ill-appearing. Cardiovascular:      Rate and Rhythm: Normal rate and regular rhythm. Heart sounds: Normal heart sounds. Comments: jason feet warm and well perfused, 2+ DP and PT pulses. jason calves soft and nontender. Pulmonary:      Effort: Pulmonary effort is normal.      Breath sounds: Normal breath sounds. Musculoskeletal:      Comments: R shoulder normal ROM and strength, neg empty can. Some stiffness and also pain with mott. Neurological:      Mental Status: She is alert and oriented to person, place, and time. Psychiatric:         Mood and Affect: Mood normal.         Behavior: Behavior normal.         Data:     Lab Results   Component Value Date     02/24/2022    K 4.5 02/24/2022     02/24/2022    CO2 27 02/24/2022    BUN 18 04/01/2022    CREATININE 0.96 04/01/2022    GLUCOSE 89 02/24/2022    PROT 7.7 02/24/2022    LABALBU 4.7 02/24/2022    BILITOT 0.31 02/24/2022    ALKPHOS 101 02/24/2022    AST 30 02/24/2022    ALT 29 02/24/2022     Lab Results   Component Value Date    WBC 6.9 02/24/2022    RBC 4.47 02/24/2022    HGB 13.0 02/24/2022    HCT 38.2 02/24/2022    MCV 85.3 02/24/2022    MCH 29.0 02/24/2022    MCHC 34.0 02/24/2022    RDW 14.5 02/24/2022     02/24/2022    MPV 9.0 12/11/2021     Lab Results   Component Value Date    TSH 2.05 04/19/2021     Lab Results   Component Value Date    CHOL 129 02/24/2022    HDL 53 02/24/2022    LABA1C 5.6 04/20/2021         Assessment & Plan:        Diagnosis Orders   1. Tendinitis of right rotator cuff     2. Bilateral calf pain     3. Infarction of right basal ganglia (HCC)     4. Essential hypertension       R shoulder pain c/w RC tendonitis. Advised continuing exercises, topical products. Try to do usual job at work as much as possible.  Advised I don't believe she's endangering herself by continuing to work but that [facility] unable to provide any disability determination, would need to go through [facility] office if she wanted to pursue that process. 2. jason calf pain with walking, no sign of PVD on exam though she does have HTN, smoking, and h/o stroke as risk factors. Suspect this is r/t sudden increase in activity. Advised to continue building up endurance, hydrate well. F/u if not improving. 3. H/o stroke - cont statin and aspirin, anti-hypertensive meds. Requested Prescriptions     Signed Prescriptions Disp Refills    amLODIPine (NORVASC) 10 MG tablet 90 tablet 3     Sig: Take 1 tablet by mouth daily    pantoprazole (PROTONIX) 40 MG tablet 90 tablet 3     Sig: Take 1 tablet by mouth daily    aspirin EC 81 MG EC tablet 90 tablet 3     Sig: Take 1 tablet by mouth daily    losartan (COZAAR) 50 MG tablet 90 tablet 3     Sig: Take 1 tablet by mouth daily    rosuvastatin (CRESTOR) 40 MG tablet 90 tablet 3     Sig: Take 1 tablet by mouth daily    ciclopirox (PENLAC) 8 % solution 1 each 5     Sig: Apply topically nightly.          There are no Patient Instructions on file for this visit.      signed by Howard Smith DO on 4/28/2022 at 10:16 PM  [address]  Dept: [phone]

## 2022-04-26 NOTE — LETTER
[facility] PRIMARY CARE LILA [address] [address]  Phone: [phone]  Fax: [address], DO        April 26, 2022     Patient: Cisco Hamilton   YOB: 1960   Date of Visit: 4/26/2022       To Whom It May Concern: It is my medical opinion that Nicolle Larios missed work 4/26 and 4/27 due to a medical condition. If you have any questions or concerns, please don't hesitate to call.     Sincerely,        Jeremy Ibrahim DO

## 2022-04-26 NOTE — LETTER
[facility] PRIMARY CARE LILA  [address] [address]  Phone: [phone]  Fax: Matt 106, DO        April 26, 2022     Patient: Phoebe Chaudhari   YOB: 1960   Date of Visit: 4/26/2022       To Whom It May Concern: It is my medical opinion that Brandy Peña missed work 4/25 and 4/26 due to a medical condition. If you have any questions or concerns, please don't hesitate to call.     Sincerely,        Mandy Ospina DO

## 2022-04-26 NOTE — PROGRESS NOTES
Name: Lori Holliday  : 1960         Chief Complaint:     Chief Complaint   Patient presents with    Shoulder Pain     right side . states that she was lifting heavy parts at work. Patient states that this is not [facility].  patient was seen  for rotator cuff, she was to schedule with Dr. Rachel Pallas. she has not contacted them yet.  Back Pain     right side. patient states that this is not [facility]       History of Present Illness:      Lori Holliday is a 65 y.o.  female who presents with Shoulder Pain (right side . states that she was lifting heavy parts at work. Patient states that this is not [facility].  patient was seen  for rotator cuff, she was to schedule with Dr. Rachel Pallas. she has not contacted them yet. ) and Back Pain (right side. patient states that this is not [facility])      HPI    ***    Medical History:     Patient Active Problem List   Diagnosis    Chronic eczematous otitis externa of both ears    Essential hypertension    Infarction of right basal ganglia (HCC)    Pseudoaneurysm of carotid artery (HCC)       Medications:       Prior to Admission medications    Medication Sig Start Date End Date Taking?  Authorizing Provider   rosuvastatin (CRESTOR) 40 MG tablet Take 1 tablet by mouth daily 22  Yes Lorena Hernandez, DO   losartan (COZAAR) 50 MG tablet Take 1 tablet by mouth daily 22  Yes Lorena Decree, DO   amLODIPine (NORVASC) 10 MG tablet Take 1 tablet by mouth daily 10/18/21  Yes Lorena Decree, DO   pantoprazole (PROTONIX) 40 MG tablet Take 1 tablet by mouth daily 10/18/21  Yes Lorena Decree, DO   aspirin EC 81 MG EC tablet Take 1 tablet by mouth daily 10/18/21  Yes Lorena Decree, DO   fluticasone (FLONASE) 50 MCG/ACT nasal spray 2 sprays by Each Nostril route daily 21  Yes Lorena Decree, DO   triamcinolone (KENALOG) 0.1 % ointment Apply topically 2 times daily 21  Yes Lorena Decree, DO   fluocinolone (DERMOTIC) 0.01 % OIL oil Put 4 drops into the affected ear(s) twice daily x 2 weeks. After that can decrease use to 1-2 times a week for maintenance. 1/4/21  Yes AGNES Harrell   Probiotic Product (PROBIOTIC-10) CAPS Take by mouth daily   Yes Historical Provider, MD   Multiple Vitamin (MULTI-VITAMIN DAILY PO) Take by mouth   Yes Historical Provider, MD   Ascorbic Acid (VITAMIN C) 250 MG tablet Take 250 mg by mouth daily   Yes Historical Provider, MD   Cholecalciferol (VITAMIN D3) 2000 units CAPS Take by mouth   Yes Historical Provider, MD   acetaminophen (TYLENOL) 325 MG tablet Take 325 mg by mouth every 6 hours as needed for Pain   Yes Historical Provider, MD   nicotine (NICODERM CQ) 7 MG/24HR Place 1 patch onto the skin daily 2/24/22   Jai Nino DO        Allergies: Other    Physical Exam:     Vitals:  /60   Pulse 84   Ht 5' 6\" (1.676 m)   Wt 124 lb (56.2 kg)   LMP  (LMP Unknown)   SpO2 99%   BMI 20.01 kg/m²   Physical Exam    Data:     Lab Results   Component Value Date     02/24/2022    K 4.5 02/24/2022     02/24/2022    CO2 27 02/24/2022    BUN 18 04/01/2022    CREATININE 0.96 04/01/2022    GLUCOSE 89 02/24/2022    PROT 7.7 02/24/2022    LABALBU 4.7 02/24/2022    BILITOT 0.31 02/24/2022    ALKPHOS 101 02/24/2022    AST 30 02/24/2022    ALT 29 02/24/2022     Lab Results   Component Value Date    WBC 6.9 02/24/2022    RBC 4.47 02/24/2022    HGB 13.0 02/24/2022    HCT 38.2 02/24/2022    MCV 85.3 02/24/2022    MCH 29.0 02/24/2022    MCHC 34.0 02/24/2022    RDW 14.5 02/24/2022     02/24/2022    MPV 9.0 12/11/2021     Lab Results   Component Value Date    TSH 2.05 04/19/2021     Lab Results   Component Value Date    CHOL 129 02/24/2022    HDL 53 02/24/2022    LABA1C 5.6 04/20/2021         Assessment & Plan:       {No diagnosis found.  (Refresh or delete this SmartLink)}  ***    Requested Prescriptions     Pending Prescriptions Disp Refills    amLODIPine (NORVASC) 10 MG tablet 90 tablet 1     Sig: Take 1 tablet by mouth daily    pantoprazole (PROTONIX) 40 MG tablet 30 tablet 5     Sig: Take 1 tablet by mouth daily    aspirin EC 81 MG EC tablet 90 tablet 1     Sig: Take 1 tablet by mouth daily    losartan (COZAAR) 50 MG tablet 90 tablet 1     Sig: Take 1 tablet by mouth daily    rosuvastatin (CRESTOR) 40 MG tablet 90 tablet 1     Sig: Take 1 tablet by mouth daily         There are no Patient Instructions on file for this visit.      signed by Belle Cooper DO on 4/26/2022 at 2:22 PM  [address]  Dept: [phone]

## 2022-04-29 RX ORDER — FLUOCINOLONE ACETONIDE 0.11 MG/ML
OIL AURICULAR (OTIC)
Qty: 20 ML | Refills: 2 | Status: SHIPPED | OUTPATIENT
Start: 2022-04-29

## 2022-04-29 NOTE — TELEPHONE ENCOUNTER
Last OV: 4/26/2022 right rotator cuff bilateral calf pain   Last RX:    Next scheduled apt: 5/25/2022 3 month HTN               surescript requesting a refill

## 2022-05-25 ENCOUNTER — OFFICE VISIT (OUTPATIENT)
Dept: FAMILY MEDICINE CLINIC | Age: 62
End: 2022-05-25
Payer: COMMERCIAL

## 2022-05-25 VITALS
SYSTOLIC BLOOD PRESSURE: 130 MMHG | BODY MASS INDEX: 20.01 KG/M2 | WEIGHT: 124 LBS | DIASTOLIC BLOOD PRESSURE: 68 MMHG | HEART RATE: 81 BPM | OXYGEN SATURATION: 100 %

## 2022-05-25 DIAGNOSIS — M75.81 TENDINITIS OF RIGHT ROTATOR CUFF: ICD-10-CM

## 2022-05-25 DIAGNOSIS — I10 ESSENTIAL HYPERTENSION: ICD-10-CM

## 2022-05-25 DIAGNOSIS — R35.0 FREQUENT URINATION: ICD-10-CM

## 2022-05-25 DIAGNOSIS — I73.9 CLAUDICATION (HCC): Primary | ICD-10-CM

## 2022-05-25 LAB
BILIRUBIN, POC: NEGATIVE
BLOOD URINE, POC: NORMAL
CLARITY, POC: CLEAR
COLOR, POC: YELLOW
GLUCOSE URINE, POC: NEGATIVE
KETONES, POC: NEGATIVE
LEUKOCYTE EST, POC: NEGATIVE
NITRITE, POC: NEGATIVE
PH, POC: 6
PROTEIN, POC: NEGATIVE
SPECIFIC GRAVITY, POC: 1.02
UROBILINOGEN, POC: 0.2

## 2022-05-25 PROCEDURE — 3017F COLORECTAL CA SCREEN DOC REV: CPT | Performed by: FAMILY MEDICINE

## 2022-05-25 PROCEDURE — G8427 DOCREV CUR MEDS BY ELIG CLIN: HCPCS | Performed by: FAMILY MEDICINE

## 2022-05-25 PROCEDURE — 1036F TOBACCO NON-USER: CPT | Performed by: FAMILY MEDICINE

## 2022-05-25 PROCEDURE — 99214 OFFICE O/P EST MOD 30 MIN: CPT | Performed by: FAMILY MEDICINE

## 2022-05-25 PROCEDURE — 81002 URINALYSIS NONAUTO W/O SCOPE: CPT | Performed by: FAMILY MEDICINE

## 2022-05-25 PROCEDURE — G8420 CALC BMI NORM PARAMETERS: HCPCS | Performed by: FAMILY MEDICINE

## 2022-05-25 NOTE — PROGRESS NOTES
Name: Nabil Salinas  : 1960         Chief Complaint:     Chief Complaint   Patient presents with    Follow-up     3 month follow up. HTN       History of Present Illness:      Nabil Salinas is a 65 y.o.  female who presents with Follow-up (3 month follow up. HTN)      HPI    Still having R shoulder pain, goes into the rest of the extremity (mainly biceps area). Doing home exercises. Back at work and able to do normal job but has to switch back and forth between arms the whole time. Calf pain for past few weeks - L calf feeling better but R one still hurts with walking in to work in the morning. Better with rest but stays tender. Works nights. Eats in the morning and again in the evening with boyfriend. Thinks she needs to add in an afternoon meal. Just has snacks at work overnight, mentions cupcake or chips. Drinks a [employer] at work every night. Working on water intake. More frequent urination, sometimes dribbling if she doesn't get to restroom soon enough. Up q2h overnight. This all just started recently. HTN - taking meds as directed around 7:30PM    Medical History:     Patient Active Problem List   Diagnosis    Chronic eczematous otitis externa of both ears    Essential hypertension    Infarction of right basal ganglia (HCC)    Pseudoaneurysm of carotid artery (HCC)       Medications:       Prior to Admission medications    Medication Sig Start Date End Date Taking? Authorizing Provider   fluocinolone (DERMOTIC) 0.01 % OIL oil Put 4 drops into the affected ear(s) twice daily x 2 weeks. After that can decrease use to 1-2 times a week for maintenance.  22  Yes Lark Brittany, DO   amLODIPine (NORVASC) 10 MG tablet Take 1 tablet by mouth daily 22  Yes Lark Brittany, DO   pantoprazole (PROTONIX) 40 MG tablet Take 1 tablet by mouth daily 22  Yes Lark Brittany, DO   aspirin EC 81 MG EC tablet Take 1 tablet by mouth daily 22  Yes Lark Brittany, DO   losartan (COZAAR) 50 MG tablet Take 1 tablet by mouth daily 4/26/22  Yes Narciso Kayser, DO   rosuvastatin (CRESTOR) 40 MG tablet Take 1 tablet by mouth daily 4/26/22  Yes Narciso Kayser, DO   ciclopirox (PENLAC) 8 % solution Apply topically nightly. 4/26/22  Yes Narciso Kayser, DO   fluticasone (FLONASE) 50 MCG/ACT nasal spray 2 sprays by Each Nostril route daily 7/29/21  Yes Narciso Kayser, DO   triamcinolone (KENALOG) 0.1 % ointment Apply topically 2 times daily 7/29/21  Yes Narciso Kayser, DO   Probiotic Product (PROBIOTIC-10) CAPS Take by mouth daily   Yes Historical Provider, MD   Multiple Vitamin (MULTI-VITAMIN DAILY PO) Take by mouth   Yes Historical Provider, MD   Ascorbic Acid (VITAMIN C) 250 MG tablet Take 250 mg by mouth daily   Yes Historical Provider, MD   Cholecalciferol (VITAMIN D3) 2000 units CAPS Take by mouth   Yes Historical Provider, MD   acetaminophen (TYLENOL) 325 MG tablet Take 325 mg by mouth every 6 hours as needed for Pain   Yes Historical Provider, MD        Allergies: Other    Physical Exam:     Vitals:  /68   Pulse 81   Wt 124 lb (56.2 kg)   LMP  (LMP Unknown)   SpO2 100%   BMI 20.01 kg/m²   Physical Exam  Vitals and nursing note reviewed. Constitutional:       General: She is not in acute distress. Appearance: Normal appearance. She is well-developed. She is not ill-appearing. Cardiovascular:      Rate and Rhythm: Normal rate and regular rhythm. Heart sounds: Normal heart sounds. Comments: R PT pulse 2+, DP barely palpable. No R calf swelling, discoloration, or tenderness  Pulmonary:      Effort: Pulmonary effort is normal.      Breath sounds: Normal breath sounds. Neurological:      Mental Status: She is alert and oriented to person, place, and time.    Psychiatric:         Mood and Affect: Mood normal.         Behavior: Behavior normal.         Data:     Lab Results   Component Value Date     02/24/2022    K 4.5 02/24/2022     02/24/2022    CO2 27 02/24/2022    BUN 18 04/01/2022    CREATININE 0.96 04/01/2022    GLUCOSE 89 02/24/2022    PROT 7.7 02/24/2022    LABALBU 4.7 02/24/2022    BILITOT 0.31 02/24/2022    ALKPHOS 101 02/24/2022    AST 30 02/24/2022    ALT 29 02/24/2022     Lab Results   Component Value Date    WBC 6.9 02/24/2022    RBC 4.47 02/24/2022    HGB 13.0 02/24/2022    HCT 38.2 02/24/2022    MCV 85.3 02/24/2022    MCH 29.0 02/24/2022    MCHC 34.0 02/24/2022    RDW 14.5 02/24/2022     02/24/2022    MPV 9.0 12/11/2021     Lab Results   Component Value Date    TSH 2.05 04/19/2021     Lab Results   Component Value Date    CHOL 129 02/24/2022    HDL 53 02/24/2022    LABA1C 5.6 04/20/2021     UA trace intact blood, otherwise wnl    Assessment & Plan:        Diagnosis Orders   1. Claudication (HCC)  VL LOWER EXTREMITY ARTERIAL SEGMENTAL PRESSURES W PPG   2. Essential hypertension     3. Frequent urination  POCT Urinalysis no Micro   4. Tendinitis of right rotator cuff       1. Pain in both calves with walking, much worse on the right. Last visit it appeared the patient had normal blood supply to the feet so even though I had suspicion for peripheral vascular disease, did not order ABIs. Today she does seem to have a decrease of dorsalis pedis pulse on the right and she has many risk factors for vascular disease including smoking, hypertension, h/o stroke. CHERYLE's ordered. ddx of calf pain would also include statin myalgia - having biceps pain also but has had RC trouble and strains areas at work. 2. HTN controlled, cont current rx  3. Frequency, little bit of dribbling - benign UA. May be r/t intake of bladder irritants, aging with some pelvic floor weakening. Monitor and consider further eval vs trial of med. 4. Cont home exercises.           Requested Prescriptions      No prescriptions requested or ordered in this encounter         There are no Patient Instructions on file for this visit.      signed by Brayan Taylor DO on 5/25/2022 at 6:53 AM  [facility] [address] PRIMARY CARE [address] [address]  Dept: [phone]

## 2022-06-09 ENCOUNTER — HOSPITAL ENCOUNTER (OUTPATIENT)
Dept: VASCULAR LAB | Age: 62
Discharge: HOME | End: 2022-06-11
Payer: COMMERCIAL

## 2022-06-09 DIAGNOSIS — I73.9 CLAUDICATION (HCC): ICD-10-CM

## 2022-06-09 PROCEDURE — 93923 UPR/LXTR ART STDY 3+ LVLS: CPT

## 2022-06-13 ENCOUNTER — TELEPHONE (OUTPATIENT)
Dept: FAMILY MEDICINE CLINIC | Age: 62
End: 2022-06-13

## 2022-06-13 DIAGNOSIS — I73.9 PERIPHERAL VASCULAR DISEASE (HCC): ICD-10-CM

## 2022-06-13 DIAGNOSIS — I73.9 CLAUDICATION (HCC): Primary | ICD-10-CM

## 2022-06-13 NOTE — TELEPHONE ENCOUNTER
Advised pt of My chart message she is willing to go anywhere in [place], [place] or [place] for a Vascular surgeon.

## 2022-06-15 ENCOUNTER — OFFICE VISIT (OUTPATIENT)
Dept: FAMILY MEDICINE CLINIC | Age: 62
End: 2022-06-15
Payer: COMMERCIAL

## 2022-06-15 VITALS
SYSTOLIC BLOOD PRESSURE: 114 MMHG | DIASTOLIC BLOOD PRESSURE: 56 MMHG | HEIGHT: 66 IN | OXYGEN SATURATION: 98 % | BODY MASS INDEX: 18.8 KG/M2 | WEIGHT: 117 LBS | HEART RATE: 108 BPM

## 2022-06-15 DIAGNOSIS — I73.9 PERIPHERAL VASCULAR DISEASE (HCC): ICD-10-CM

## 2022-06-15 DIAGNOSIS — M54.50 ACUTE BILATERAL LOW BACK PAIN WITHOUT SCIATICA: Primary | ICD-10-CM

## 2022-06-15 PROCEDURE — 99213 OFFICE O/P EST LOW 20 MIN: CPT | Performed by: FAMILY MEDICINE

## 2022-06-15 PROCEDURE — 3017F COLORECTAL CA SCREEN DOC REV: CPT | Performed by: FAMILY MEDICINE

## 2022-06-15 PROCEDURE — G8420 CALC BMI NORM PARAMETERS: HCPCS | Performed by: FAMILY MEDICINE

## 2022-06-15 PROCEDURE — G8427 DOCREV CUR MEDS BY ELIG CLIN: HCPCS | Performed by: FAMILY MEDICINE

## 2022-06-15 PROCEDURE — 1036F TOBACCO NON-USER: CPT | Performed by: FAMILY MEDICINE

## 2022-06-15 RX ORDER — CILOSTAZOL 100 MG/1
100 TABLET ORAL 2 TIMES DAILY
Qty: 60 TABLET | Refills: 3 | Status: SHIPPED | OUTPATIENT
Start: 2022-06-15 | End: 2022-08-17

## 2022-06-15 NOTE — LETTER
[facility]ARE [address] [address]  Phone: [phone]  Fax: [address], DO        Jessica 15, 2022     Patient: Cassy Church   YOB: 1960   Date of Visit: 6/15/2022       To Whom It May Concern: It is my medical opinion that Araceli Martinez missed work 6/13-15 due to a medical condition. If you have any questions or concerns, please don't hesitate to call.     Sincerely,        Christine Laird, DO

## 2022-06-15 NOTE — PATIENT INSTRUCTIONS
Patient Education        Low Back Pain: Exercises  Introduction  Here are some examples of exercises for you to try. The exercises may be suggested for a condition or for rehabilitation. Start each exercise slowly. Ease off the exercises if you start to have pain. You will be told when to start these exercises and which ones will work bestfor you. How to do the exercises  Press-up    1. Lie on your stomach, supporting your body with your forearms. 2. Press your elbows down into the floor to raise your upper back. As you do this, relax your stomach muscles and allow your back to arch without using your back muscles. As your press up, do not let your hips or pelvis come off the floor. 3. Hold for 15 to 30 seconds, then relax. 4. Repeat 2 to 4 times. Alternate arm and leg (bird dog) exercise    Do this exercise slowly. Try to keep your body straight at all times, and donot let one hip drop lower than the other. 1. Start on the floor, on your hands and knees. 2. Tighten your belly muscles. 3. Raise one leg off the floor, and hold it straight out behind you. Be careful not to let your hip drop down, because that will twist your trunk. 4. Hold for about 6 seconds, then lower your leg and switch to the other leg. 5. Repeat 8 to 12 times on each leg. 6. Over time, work up to holding for 10 to 30 seconds each time. 7. If you feel stable and secure with your leg raised, try raising the opposite arm straight out in front of you at the same time. Knee-to-chest exercise    1. Lie on your back with your knees bent and your feet flat on the floor. 2. Bring one knee to your chest, keeping the other foot flat on the floor (or keeping the other leg straight, whichever feels better on your lower back). 3. Keep your lower back pressed to the floor. Hold for at least 15 to 30 seconds. 4. Relax, and lower the knee to the starting position. 5. Repeat with the other leg. Repeat 2 to 4 times with each leg.   6. To get more stretch, put your other leg flat on the floor while pulling your knee to your chest.  Curl-ups    1. Lie on the floor on your back with your knees bent at a 90-degree angle. Your feet should be flat on the floor, about 12 inches from your buttocks. 2. Cross your arms over your chest. If this bothers your neck, try putting your hands behind your neck (not your head), with your elbows spread apart. 3. Slowly tighten your belly muscles and raise your shoulder blades off the floor. 4. Keep your head in line with your body, and do not press your chin to your chest.  5. Hold this position for 1 or 2 seconds, then slowly lower yourself back down to the floor. 6. Repeat 8 to 12 times. Pelvic tilt exercise    1. Lie on your back with your knees bent. 2. \"Brace\" your stomach. This means to tighten your muscles by pulling in and imagining your belly button moving toward your spine. You should feel like your back is pressing to the floor and your hips and pelvis are rocking back. 3. Hold for about 6 seconds while you breathe smoothly. 4. Repeat 8 to 12 times. Heel dig bridging    1. Lie on your back with both knees bent and your ankles bent so that only your heels are digging into the floor. Your knees should be bent about 90 degrees. 2. Then push your heels into the floor, squeeze your buttocks, and lift your hips off the floor until your shoulders, hips, and knees are all in a straight line. 3. Hold for about 6 seconds as you continue to breathe normally, and then slowly lower your hips back down to the floor and rest for up to 10 seconds. 4. Do 8 to 12 repetitions. Hamstring stretch in doorway    1. Lie on your back in a doorway, with one leg through the open door. 2. Slide your leg up the wall to straighten your knee. You should feel a gentle stretch down the back of your leg. 3. Hold the stretch for at least 15 to 30 seconds. Do not arch your back, point your toes, or bend either knee.  Keep one heel touching the floor and the other heel touching the wall. 4. Repeat with your other leg. 5. Do 2 to 4 times for each leg. Hip flexor stretch    1. Kneel on the floor with one knee bent and one leg behind you. Place your forward knee over your foot. Keep your other knee touching the floor. 2. Slowly push your hips forward until you feel a stretch in the upper thigh of your rear leg. 3. Hold the stretch for at least 15 to 30 seconds. Repeat with your other leg. 4. Do 2 to 4 times on each side. Wall sit    1. Stand with your back 10 to 12 inches away from a wall. 2. Lean into the wall until your back is flat against it. 3. Slowly slide down until your knees are slightly bent, pressing your lower back into the wall. 4. Hold for about 6 seconds, then slide back up the wall. 5. Repeat 8 to 12 times. Follow-up care is a key part of your treatment and safety. Be sure to make and go to all appointments, and call your doctor if you are having problems. It's also a good idea to know your test results and keep alist of the medicines you take. Where can you learn more? Go to https://[place]. com. cn.[employer]. org and sign in to your [employer] account. Enter U190 in the [facility] box to learn more about \"Low Back Pain: Exercises. \"     If you do not have an account, please click on the \"Sign Up Now\" link. Current as of: July 1, 2021               Content Version: 13.2  © 2006-2022 Healthwise, Incorporated. Care instructions adapted under license by [facility] ([facility]). If you have questions about a medical condition or this instruction, always ask your healthcare professional. [place] [address] any warranty or liability for your use of this information.

## 2022-06-19 PROBLEM — I73.9 PERIPHERAL VASCULAR DISEASE (HCC): Status: ACTIVE | Noted: 2022-06-19

## 2022-07-06 ENCOUNTER — OFFICE VISIT (OUTPATIENT)
Dept: VASCULAR SURGERY | Age: 62
End: 2022-07-06
Payer: COMMERCIAL

## 2022-07-06 VITALS
WEIGHT: 119.3 LBS | TEMPERATURE: 97.3 F | DIASTOLIC BLOOD PRESSURE: 64 MMHG | BODY MASS INDEX: 19.17 KG/M2 | HEIGHT: 66 IN | RESPIRATION RATE: 16 BRPM | HEART RATE: 94 BPM | SYSTOLIC BLOOD PRESSURE: 124 MMHG

## 2022-07-06 DIAGNOSIS — I73.9 PAD (PERIPHERAL ARTERY DISEASE) (HCC): Primary | ICD-10-CM

## 2022-07-06 DIAGNOSIS — I70.213 ATHEROSCLEROSIS OF NATIVE ARTERIES OF EXTREMITIES WITH INTERMITTENT CLAUDICATION, BILATERAL LEGS (HCC): ICD-10-CM

## 2022-07-06 PROCEDURE — G8420 CALC BMI NORM PARAMETERS: HCPCS | Performed by: SURGERY

## 2022-07-06 PROCEDURE — G8420 CALC BMI NORM PARAMETERS: HCPCS | Performed by: INTERNAL MEDICINE

## 2022-07-06 PROCEDURE — 3017F COLORECTAL CA SCREEN DOC REV: CPT | Performed by: INTERNAL MEDICINE

## 2022-07-06 PROCEDURE — 99203 OFFICE O/P NEW LOW 30 MIN: CPT | Performed by: SURGERY

## 2022-07-06 PROCEDURE — 3017F COLORECTAL CA SCREEN DOC REV: CPT | Performed by: SURGERY

## 2022-07-06 PROCEDURE — 1036F TOBACCO NON-USER: CPT | Performed by: SURGERY

## 2022-07-06 PROCEDURE — G8427 DOCREV CUR MEDS BY ELIG CLIN: HCPCS | Performed by: SURGERY

## 2022-07-06 PROCEDURE — 1036F TOBACCO NON-USER: CPT | Performed by: INTERNAL MEDICINE

## 2022-07-06 PROCEDURE — G8428 CUR MEDS NOT DOCUMENT: HCPCS | Performed by: INTERNAL MEDICINE

## 2022-07-06 PROCEDURE — 99204 OFFICE O/P NEW MOD 45 MIN: CPT | Performed by: INTERNAL MEDICINE

## 2022-07-06 NOTE — PROGRESS NOTES
Tammy Peacock was seen on 2022 for   Chief Complaint   Patient presents with    New Patient     wants angio in [place]   . 22 Pt was evaluated by Dr Flaco Padron today for claudication (see note) after being referred by Dr Tammi Bates. She has lifestyle limiting claudication right leg with an luz of 0.72. It inhibits her ability even to walk from her car to work. She has had heart racing on cilostazol, forcing her to decrease dose. Not helping with leg pain. Has headache as well. Social History     Socioeconomic History    Marital status:      Spouse name: Not on file    Number of children: Not on file    Years of education: Not on file    Highest education level: Not on file   Occupational History    Not on file   Tobacco Use    Smoking status: Former Smoker     Packs/day: 0.50     Years: 30.00     Pack years: 15.00     Quit date: 2021     Years since quittin.2    Smokeless tobacco: Never Used   Vaping Use    Vaping Use: Never used   Substance and Sexual Activity    Alcohol use: No    Drug use: No    Sexual activity: Not on file   Other Topics Concern    Not on file   Social History Narrative    Not on file     Social Determinants of Health     Financial Resource Strain: Low Risk     Difficulty of Paying Living Expenses: Not hard at all   Food Insecurity: No Food Insecurity    Worried About [address] in the Last Year: Never true    [address] in the Last Year: Never true   Transportation Needs:     Lack of Transportation (Medical): Not on file    Lack of Transportation (Non-Medical):  Not on file   Physical Activity:     Days of Exercise per Week: Not on file    Minutes of Exercise per Session: Not on file   Stress:     Feeling of Stress : Not on file   Social Connections:     Frequency of Communication with Friends and Family: Not on file    Frequency of Social Gatherings with Friends and Family: Not on file    Attends Nondenominational Services: Not on file   [facility] [employer] or Organizations: Not on file    Attends Club or Organization Meetings: Not on file    Marital Status: Not on file   Intimate Partner Violence:     Fear of Current or Ex-Partner: Not on file    Emotionally Abused: Not on file    Physically Abused: Not on file    Sexually Abused: Not on file   Housing Stability:     Unable to Pay for Housing in the Last Year: Not on file    Number of [place] in the Last Year: Not on file    Unstable Housing in the Last Year: Not on file     No family history on file. Past Medical History:   Diagnosis Date    Allergic rhinitis     dust mites and nickel    Bursitis of left shoulder     Essential hypertension 1/31/2021    Dr. Kendrick Walker    Pseudoaneurysm of carotid artery [facility]) 8/1/2021    Transient ischemic attack 04/19/2021    Wears dentures     Wears glasses      Current Outpatient Medications   Medication Sig Dispense Refill    cilostazol (PLETAL) 100 MG tablet Take 1 tablet by mouth 2 times daily (Patient taking differently: Take 100 mg by mouth daily ) 60 tablet 3    fluocinolone (DERMOTIC) 0.01 % OIL oil Put 4 drops into the affected ear(s) twice daily x 2 weeks. After that can decrease use to 1-2 times a week for maintenance. 20 mL 2    amLODIPine (NORVASC) 10 MG tablet Take 1 tablet by mouth daily 90 tablet 3    pantoprazole (PROTONIX) 40 MG tablet Take 1 tablet by mouth daily 90 tablet 3    aspirin EC 81 MG EC tablet Take 1 tablet by mouth daily 90 tablet 3    losartan (COZAAR) 50 MG tablet Take 1 tablet by mouth daily 90 tablet 3    rosuvastatin (CRESTOR) 40 MG tablet Take 1 tablet by mouth daily 90 tablet 3    ciclopirox (PENLAC) 8 % solution Apply topically nightly.  1 each 5    fluticasone (FLONASE) 50 MCG/ACT nasal spray 2 sprays by Each Nostril route daily 1 Bottle 1    triamcinolone (KENALOG) 0.1 % ointment Apply topically 2 times daily 30 g 1    Probiotic Product (PROBIOTIC-10) CAPS Take by mouth daily  Multiple Vitamin (MULTI-VITAMIN DAILY PO) Take by mouth      Ascorbic Acid (VITAMIN C) 250 MG tablet Take 250 mg by mouth daily      Cholecalciferol (VITAMIN D3) 2000 units CAPS Take by mouth      acetaminophen (TYLENOL) 325 MG tablet Take 325 mg by mouth every 6 hours as needed for Pain       No current facility-administered medications for this visit. Physical findings:  General- no acute distress, oriented  HEENT - no xanthelasma, external ears normal  Neck- Supple, no thyromegaly  Skin- warm, dry, no skin breakdown or gangrene  Extremities - no edema    Pulses Right - Posterior tibial:    absent  Dorsalis pedis:  absent     Pulses Left -Posterior tibial: 2+  Dorsalis pedis:  absent      Assessment:  Encounter Diagnosis   Name Primary?  PAD (peripheral artery disease) (HCC) Yes     PAD with [place] 3 claudication  Plan of care:  7/12/22 angio with right leg intervention, left groin access  Angio using left groin access  45 min chart revie and pt encounter  Follow up and evaluate.        Electronically signed by Tiffany Sanders MD on 7/6/22 at 10:20 AM EDT

## 2022-07-06 NOTE — PROGRESS NOTES
reports that she does not drink alcohol and does not use drugs. Family History: family history is not on file. Review of Systems:   Constitutional:  negative for  fevers, chills, sweats, fatigue, and weight loss  HEENT:  negative for vision or hearing changes,   Respiratory:  negative for shortness of breath, cough, or congestion  Cardiovascular:  negative for  chest pain, palpitations  Gastrointestinal:  negative for nausea, vomiting, diarrhea, constipation, abdominal pain  Genitourinary:  negative for frequency, dysuria  Integument/Breast:  negative for rash, skin lesions  Musculoskeletal:  negative for muscle aches or joint pain  Neurological:  negative for headaches, dizziness, lightheadedness, numbness, pain and tingling extremities  Behavior/Psych:  negative for depression and anxiety    Allergies: Other    Current Meds:  Current Outpatient Medications:     cilostazol (PLETAL) 100 MG tablet, Take 1 tablet by mouth 2 times daily (Patient taking differently: Take 100 mg by mouth daily ), Disp: 60 tablet, Rfl: 3    fluocinolone (DERMOTIC) 0.01 % OIL oil, Put 4 drops into the affected ear(s) twice daily x 2 weeks.   After that can decrease use to 1-2 times a week for maintenance., Disp: 20 mL, Rfl: 2    amLODIPine (NORVASC) 10 MG tablet, Take 1 tablet by mouth daily, Disp: 90 tablet, Rfl: 3    pantoprazole (PROTONIX) 40 MG tablet, Take 1 tablet by mouth daily, Disp: 90 tablet, Rfl: 3    aspirin EC 81 MG EC tablet, Take 1 tablet by mouth daily, Disp: 90 tablet, Rfl: 3    losartan (COZAAR) 50 MG tablet, Take 1 tablet by mouth daily, Disp: 90 tablet, Rfl: 3    rosuvastatin (CRESTOR) 40 MG tablet, Take 1 tablet by mouth daily, Disp: 90 tablet, Rfl: 3    fluticasone (FLONASE) 50 MCG/ACT nasal spray, 2 sprays by Each Nostril route daily, Disp: 1 Bottle, Rfl: 1    triamcinolone (KENALOG) 0.1 % ointment, Apply topically 2 times daily, Disp: 30 g, Rfl: 1    Probiotic Product (PROBIOTIC-10) CAPS, Take by mouth daily, Disp: , Rfl:     Multiple Vitamin (MULTI-VITAMIN DAILY PO), Take by mouth, Disp: , Rfl:     Ascorbic Acid (VITAMIN C) 250 MG tablet, Take 250 mg by mouth daily, Disp: , Rfl:     Cholecalciferol (VITAMIN D3) 2000 units CAPS, Take by mouth, Disp: , Rfl:     acetaminophen (TYLENOL) 325 MG tablet, Take 325 mg by mouth every 6 hours as needed for Pain, Disp: , Rfl:     ciclopirox (PENLAC) 8 % solution, Apply topically nightly., Disp: 1 each, Rfl: 5    Vital Signs:  Vitals:    07/06/22 0952   BP: 124/64   Pulse: 94   Resp: 16   Temp: 97.3 °F (36.3 °C)       Physical Exam:  General appearance - alert, well appearing and in no acute distress  Mental status - oriented to person, place and time with normal affect  Head - normocephalic and atraumatic  Eyes - pupils equal and reactive, extraocular eye movements intact, conjunctiva clear  Ears - hearing appears to be intact  Nose - no drainage noted  Mouth - mucous membranes moist  Neck - supple, no carotid bruits, thyroid not palpable, no JVD  Chest - clear to auscultation, normal effort  Heart - normal rate, regular rhythm, no murmurs  Abdomen - soft, non-tender, non-distended, bowel sounds present all four quadrants, no masses, hepatomegaly, splenomegaly or aortic enlargement  Neurological - normal speech, no focal findings or movement disorder noted, cranial nerves II through XII grossly intact  Extremities - peripheral pulses by doppler, no pedal edema or calf pain with palpation  Skin - no gross lesions, rashes, or induration noted  Labs:   Lab Results   Component Value Date/Time    WBC 6.9 02/24/2022 09:16 AM    HGB 13.0 02/24/2022 09:16 AM    HCT 38.2 02/24/2022 09:16 AM    MCV 85.3 02/24/2022 09:16 AM     02/24/2022 09:16 AM     Lab Results   Component Value Date/Time     02/24/2022 09:16 AM    K 4.5 02/24/2022 09:16 AM     02/24/2022 09:16 AM    CO2 27 02/24/2022 09:16 AM    BUN 18 04/01/2022 10:42 AM    CREATININE 0.96 04/01/2022 10:42 AM    GLUCOSE 89 02/24/2022 09:16 AM    CALCIUM 9.5 02/24/2022 09:16 AM     Lab Results   Component Value Date/Time    ALKPHOS 101 02/24/2022 09:16 AM    ALT 29 02/24/2022 09:16 AM    AST 30 02/24/2022 09:16 AM    PROT 7.7 02/24/2022 09:16 AM    BILITOT 0.31 02/24/2022 09:16 AM    LABALBU 4.7 02/24/2022 09:16 AM     No results found for: LACTA  Lab Results   Component Value Date/Time    AMYLASE 114 05/06/2021 09:12 AM     Lab Results   Component Value Date/Time    LIPASE 37 05/06/2021 09:12 AM     No results found for: INR      Assessment:  3 71-year-old female with right leg claudication    1. Atherosclerosis of native arteries of extremities with intermittent claudication, bilateral legs (HCC)        Plan:   1. I will refer her to  for revascularization    No orders of the defined types were placed in this encounter.           Electronically signed by Tez Stokes MD on 7/6/2022 at 10:09 AM     Copy sent to Dr. Ferny Monk DO

## 2022-07-12 VITALS
SYSTOLIC BLOOD PRESSURE: 109 MMHG | DIASTOLIC BLOOD PRESSURE: 56 MMHG | OXYGEN SATURATION: 99 % | HEART RATE: 74 BPM | RESPIRATION RATE: 16 BRPM | TEMPERATURE: 98.1 F

## 2022-07-12 LAB
ANION GAP SERPL CALCULATED.3IONS-SCNC: 13 MMOL/L (ref 9–17)
BUN BLDV-MCNC: 19 MG/DL (ref 8–23)
BUN/CREAT BLD: 25 (ref 9–20)
CALCIUM SERPL-MCNC: 9.8 MG/DL (ref 8.6–10.4)
CHLORIDE BLD-SCNC: 101 MMOL/L (ref 98–107)
CO2: 24 MMOL/L (ref 20–31)
CREAT SERPL-MCNC: 0.76 MG/DL (ref 0.5–0.9)
GFR AFRICAN AMERICAN: >60 ML/MIN
GFR NON-AFRICAN AMERICAN: >60 ML/MIN
GFR SERPL CREATININE-BSD FRML MDRD: ABNORMAL ML/MIN/{1.73_M2}
GFR SERPL CREATININE-BSD FRML MDRD: ABNORMAL ML/MIN/{1.73_M2}
GLUCOSE BLD-MCNC: 90 MG/DL (ref 70–99)
HCT VFR BLD CALC: 38.5 % (ref 36.3–47.1)
HEMOGLOBIN: 12.9 G/DL (ref 11.9–15.1)
INR BLD: 0.9
MCH RBC QN AUTO: 30 PG (ref 25.2–33.5)
MCHC RBC AUTO-ENTMCNC: 33.5 G/DL (ref 28.4–34.8)
MCV RBC AUTO: 89.5 FL (ref 82.6–102.9)
NRBC AUTOMATED: 0 PER 100 WBC
PARTIAL THROMBOPLASTIN TIME: 28.1 SEC (ref 26.8–34.8)
PDW BLD-RTO: 14.3 % (ref 11.8–14.4)
PLATELET # BLD: 359 K/UL (ref 138–453)
PMV BLD AUTO: 8.7 FL (ref 8.1–13.5)
POTASSIUM SERPL-SCNC: 4.4 MMOL/L (ref 3.7–5.3)
PROTHROMBIN TIME: 12.3 SEC (ref 11.5–14.2)
RBC # BLD: 4.3 M/UL (ref 3.95–5.11)
SODIUM BLD-SCNC: 138 MMOL/L (ref 135–144)
WBC # BLD: 7.3 K/UL (ref 3.5–11.3)

## 2022-07-12 PROCEDURE — 85730 THROMBOPLASTIN TIME PARTIAL: CPT

## 2022-07-12 PROCEDURE — 80048 BASIC METABOLIC PNL TOTAL CA: CPT

## 2022-07-12 PROCEDURE — 85027 COMPLETE CBC AUTOMATED: CPT

## 2022-07-12 PROCEDURE — 85610 PROTHROMBIN TIME: CPT

## 2022-07-12 PROCEDURE — 2580000003 HC RX 258: Performed by: INTERNAL MEDICINE

## 2022-07-12 RX ORDER — SODIUM CHLORIDE 9 MG/ML
INJECTION, SOLUTION INTRAVENOUS CONTINUOUS
Status: DISCONTINUED | OUTPATIENT
Start: 2022-07-12 | End: 2022-07-15 | Stop reason: HOSPADM

## 2022-07-12 RX ORDER — CLOPIDOGREL BISULFATE 75 MG/1
75 TABLET ORAL DAILY
COMMUNITY

## 2022-07-12 RX ADMIN — SODIUM CHLORIDE: 9 INJECTION, SOLUTION INTRAVENOUS at 12:08

## 2022-07-13 NOTE — H&P
22 Pt was evaluated by Dr Heniretta Perdomo today for claudication (see note) after being referred by Dr Oswald Osei. She has lifestyle limiting claudication right leg with an luz of 0.72. It inhibits her ability even to walk from her car to work. She has had heart racing on cilostazol, forcing her to decrease dose. Not helping with leg pain. Has headache as well.      Social History               Socioeconomic History    Marital status:        Spouse name: Not on file    Number of children: Not on file    Years of education: Not on file    Highest education level: Not on file   Occupational History    Not on file   Tobacco Use    Smoking status: Former Smoker       Packs/day: 0.50       Years: 30.00       Pack years: 15.00       Quit date: 2021       Years since quittin.2    Smokeless tobacco: Never Used   Vaping Use    Vaping Use: Never used   Substance and Sexual Activity    Alcohol use: No    Drug use: No    Sexual activity: Not on file   Other Topics Concern    Not on file   Social History Narrative    Not on file      Social Determinants of Health          Financial Resource Strain: Low Risk     Difficulty of Paying Living Expenses: Not hard at all   Food Insecurity: No Food Insecurity    Worried About Running Out of Food in the Last Year: Never true    [address] in the Last Year: Never true   Transportation Needs:     Lack of Transportation (Medical): Not on file    Lack of Transportation (Non-Medical):  Not on file   Physical Activity:     Days of Exercise per Week: Not on file    Minutes of Exercise per Session: Not on file   Stress:     Feeling of Stress : Not on file   Social Connections:     Frequency of Communication with Friends and Family: Not on file    Frequency of Social Gatherings with Friends and Family: Not on file    Attends Buddhist Services: Not on file    Active Member of Clubs or Organizations: Not on file    Attends Club or Organization Meetings: Not on file    Marital Status: Not on file   Intimate Partner Violence:     Fear of Current or Ex-Partner: Not on file    Emotionally Abused: Not on file    Physically Abused: Not on file    Sexually Abused: Not on file   Housing Stability:     Unable to Pay for Housing in the Last Year: Not on file    Number of [place] in the Last Year: Not on file    Unstable Housing in the Last Year: Not on file         Family History   No family history on file. Past Medical History        Past Medical History:   Diagnosis Date    Allergic rhinitis       dust mites and nickel    Bursitis of left shoulder      Essential hypertension 1/31/2021     Dr. Jean Pacheco    Pseudoaneurysm of carotid artery ([facility] [address] 8/1/2021    Transient ischemic attack 04/19/2021    Wears dentures      Wears glasses           Current Facility-Administered Medications          Current Outpatient Medications   Medication Sig Dispense Refill    cilostazol (PLETAL) 100 MG tablet Take 1 tablet by mouth 2 times daily (Patient taking differently: Take 100 mg by mouth daily ) 60 tablet 3    fluocinolone (DERMOTIC) 0.01 % OIL oil Put 4 drops into the affected ear(s) twice daily x 2 weeks. After that can decrease use to 1-2 times a week for maintenance. 20 mL 2    amLODIPine (NORVASC) 10 MG tablet Take 1 tablet by mouth daily 90 tablet 3    pantoprazole (PROTONIX) 40 MG tablet Take 1 tablet by mouth daily 90 tablet 3    aspirin EC 81 MG EC tablet Take 1 tablet by mouth daily 90 tablet 3    losartan (COZAAR) 50 MG tablet Take 1 tablet by mouth daily 90 tablet 3    rosuvastatin (CRESTOR) 40 MG tablet Take 1 tablet by mouth daily 90 tablet 3    ciclopirox (PENLAC) 8 % solution Apply topically nightly.  1 each 5    fluticasone (FLONASE) 50 MCG/ACT nasal spray 2 sprays by Each Nostril route daily 1 Bottle 1    triamcinolone (KENALOG) 0.1 % ointment Apply topically 2 times daily 30 g 1    Probiotic Product (PROBIOTIC-10) CAPS Take by mouth daily        Multiple Vitamin (MULTI-VITAMIN DAILY PO) Take by mouth        Ascorbic Acid (VITAMIN C) 250 MG tablet Take 250 mg by mouth daily        Cholecalciferol (VITAMIN D3) 2000 units CAPS Take by mouth        acetaminophen (TYLENOL) 325 MG tablet Take 325 mg by mouth every 6 hours as needed for Pain          No current facility-administered medications for this visit.               Physical findings:  General- no acute distress, oriented  HEENT - no xanthelasma, external ears normal  Neck- Supple, no thyromegaly  Skin- warm, dry, no skin breakdown or gangrene  Extremities - no edema     Pulses Right - Posterior tibial:    absent  Dorsalis pedis:  absent     Pulses Left -Posterior tibial: 2+  Dorsalis pedis:  absent        Assessment:       Encounter Diagnosis   Name Primary?     PAD (peripheral artery disease) (HCC) Yes      PAD with [place] 3 claudication  Plan of care:  7/12/22 angio with right leg intervention, left groin access  Angio using left groin access  UPDATE 7/12/22  Insurance coverage not obtained  Case postponed  Tiffany Sanders MD

## 2022-07-28 ENCOUNTER — TELEPHONE (OUTPATIENT)
Dept: VASCULAR SURGERY | Age: 62
End: 2022-07-28

## 2022-07-28 NOTE — TELEPHONE ENCOUNTER
Hien Drake called to inquire about upcoming appointment since her procedure date was changed. Attempted to return call numerous times. Patient has no voicemail or answering machine. Made a tentative appointment for Aug. 17, at 1:15. Please inform patient if she call again.

## 2022-08-02 ENCOUNTER — HOSPITAL ENCOUNTER (OUTPATIENT)
Dept: INTERVENTIONAL RADIOLOGY/VASCULAR | Age: 62
Discharge: HOME | End: 2022-07-14
Attending: INTERNAL MEDICINE | Admitting: INTERNAL MEDICINE
Payer: COMMERCIAL

## 2022-08-02 ENCOUNTER — HOSPITAL ENCOUNTER (OUTPATIENT)
Dept: INTERVENTIONAL RADIOLOGY/VASCULAR | Age: 62
Discharge: HOME | End: 2022-08-02
Attending: INTERNAL MEDICINE | Admitting: INTERNAL MEDICINE
Payer: COMMERCIAL

## 2022-08-02 VITALS
RESPIRATION RATE: 19 BRPM | WEIGHT: 116 LBS | DIASTOLIC BLOOD PRESSURE: 67 MMHG | OXYGEN SATURATION: 97 % | BODY MASS INDEX: 18.64 KG/M2 | HEART RATE: 84 BPM | TEMPERATURE: 97.6 F | SYSTOLIC BLOOD PRESSURE: 124 MMHG | HEIGHT: 66 IN

## 2022-08-02 DIAGNOSIS — I73.9 PAD (PERIPHERAL ARTERY DISEASE) (HCC): ICD-10-CM

## 2022-08-02 DIAGNOSIS — I73.9 PAD (PERIPHERAL ARTERY DISEASE) (HCC): Primary | ICD-10-CM

## 2022-08-02 LAB
ACTIVATED CLOTTING TIME: 182 SEC (ref 79–149)
ACTIVATED CLOTTING TIME: 203 SEC (ref 79–149)
ACTIVATED CLOTTING TIME: 207 SEC (ref 79–149)
ACTIVATED CLOTTING TIME: 224 SEC (ref 79–149)
ACTIVATED CLOTTING TIME: 288 SEC (ref 79–149)

## 2022-08-02 PROCEDURE — 99153 MOD SED SAME PHYS/QHP EA: CPT

## 2022-08-02 PROCEDURE — 37226 HC FEM POP TERR PLASTY AND STENT: CPT

## 2022-08-02 PROCEDURE — 37223 HC ILIAC TERRITORY ADDL STENT: CPT

## 2022-08-02 PROCEDURE — 99152 MOD SED SAME PHYS/QHP 5/>YRS: CPT

## 2022-08-02 PROCEDURE — 6360000004 HC RX CONTRAST MEDICATION: Performed by: INTERNAL MEDICINE

## 2022-08-02 PROCEDURE — 85347 COAGULATION TIME ACTIVATED: CPT

## 2022-08-02 PROCEDURE — 2500000003 HC RX 250 WO HCPCS

## 2022-08-02 PROCEDURE — 93005 ELECTROCARDIOGRAM TRACING: CPT

## 2022-08-02 PROCEDURE — 2580000003 HC RX 258: Performed by: INTERNAL MEDICINE

## 2022-08-02 PROCEDURE — 2709999900 IR ANGIOGRAM EXTREMITY BILATERAL

## 2022-08-02 PROCEDURE — 6360000002 HC RX W HCPCS

## 2022-08-02 PROCEDURE — 37221 HC ILIAC TERRITORY PLASTY STENT: CPT

## 2022-08-02 RX ORDER — SODIUM CHLORIDE 0.9 % (FLUSH) 0.9 %
5-40 SYRINGE (ML) INJECTION PRN
Status: CANCELLED | OUTPATIENT
Start: 2022-08-02

## 2022-08-02 RX ORDER — SODIUM CHLORIDE 0.9 % (FLUSH) 0.9 %
5-40 SYRINGE (ML) INJECTION EVERY 12 HOURS SCHEDULED
Status: CANCELLED | OUTPATIENT
Start: 2022-08-02

## 2022-08-02 RX ORDER — ACETAMINOPHEN 325 MG/1
650 TABLET ORAL EVERY 4 HOURS PRN
Status: CANCELLED | OUTPATIENT
Start: 2022-08-02

## 2022-08-02 RX ORDER — SODIUM CHLORIDE 9 MG/ML
INJECTION, SOLUTION INTRAVENOUS CONTINUOUS
Status: DISCONTINUED | OUTPATIENT
Start: 2022-08-02 | End: 2022-08-02 | Stop reason: HOSPADM

## 2022-08-02 RX ORDER — SODIUM CHLORIDE 9 MG/ML
INJECTION, SOLUTION INTRAVENOUS PRN
Status: CANCELLED | OUTPATIENT
Start: 2022-08-02

## 2022-08-02 RX ADMIN — IOPAMIDOL 140 ML: 755 INJECTION, SOLUTION INTRAVENOUS at 13:37

## 2022-08-02 RX ADMIN — SODIUM CHLORIDE: 9 INJECTION, SOLUTION INTRAVENOUS at 11:04

## 2022-08-02 ASSESSMENT — PAIN - FUNCTIONAL ASSESSMENT: PAIN_FUNCTIONAL_ASSESSMENT: NONE - DENIES PAIN

## 2022-08-02 NOTE — PROGRESS NOTES
BP remains low, IV bolus infusing. No sign of hematoma or external bleeding noted. Denies abdominal or back pain. HR remains stable. Ectopy noted on heart monitor, denies chest pain. EKG completed, reads NSR. Patient conversing with staff, denies feeling lightheaded or nauseated.

## 2022-08-02 NOTE — DISCHARGE INSTRUCTIONS
to 48 hours after the procedure. Pat the site dry. Do not take a bath for 1 week. Your doctor will tell you when you can have sex again. Diet  You can eat your normal diet. If your stomach is upset, try bland, low-fat foods like plain rice, broiled chicken, toast, and yogurt. Drink plenty of fluids (unless your doctor tells you not to) to flush dye from system. Try to avoid constipation and straining with bowel movements. You may want to take a fiber supplement every day. If you have not had a bowel movement after a couple of days, ask your doctor about taking a mild laxative. Medicines  Your doctor will tell you if and when you can restart your medicines. He or she will also give you instructions about taking any new medicines. If you take blood thinners, such as warfarin (Coumadin), clopidogrel (Plavix), or aspirin, be sure to talk to your doctor. He or she will tell you if and when to start taking those medicines again. Make sure that you understand exactly what your doctor wants you to do. Be safe with medicines. Take your medicines exactly as prescribed. Call your doctor if you think you are having a problem with your medicine. If your doctor prescribed antibiotics, take them as directed. Do not stop taking them just because you feel better. You need to take the full course of antibiotics. Your doctor will probably prescribe a blood thinner when you go home. This helps prevent blood clots. Be sure you get instructions about how to take your medicine safely. Blood thinners can cause serious bleeding problems. Care of the catheter site  Remove bandage over the groin in 24 hours. You will need to lie flat for a few hours after the procedure to prevent bleeding. Check site for lump or visible bleeding or if you have new back or groin pain, apply pressure to groin and call 911. Pain  You may put ice or a cold pack on the catheter site for 10 to 15 minutes at a time to help with soreness or swelling.  Put a thin cloth between the ice and your skin. TakeTylenol for pain as needed per package instructions. Follow-up care is a key part of your treatment and safety. Be sure to make and go to all appointments, and call your doctor if you are having problems. It's also a good idea to know your test results and keep a list of the medicines you take. When should you call for help? Call 911 anytime you think you may need emergency care. For example, call if:  You passed out (lost consciousness). You have severe trouble breathing. You have sudden chest pain and shortness of breath, or you cough up blood. You have symptoms of a stroke. These may include:  Sudden numbness, tingling, weakness, or loss of movement in your face, arm, or leg, especially on only one side of your body. Sudden vision changes. Sudden trouble speaking. Sudden confusion or trouble understanding simple statements. Sudden problems with walking or balance. A sudden, severe headache that is different from past headaches. Your groin is very swollen and you have a lump that is getting bigger under your skin where the catheter was put in. Call your doctor now or seek immediate medical care if:  You have severe pain in your leg, or it becomes cold, pale, blue, tingly, or numb. You are bleeding from the catheter site. You have a fast-growing, painful lump at the catheter site. You have pain that does not get better after you take pain medicine. You have signs of infection, such as: Increased pain, swelling, warmth, or redness of the skin. Red streaks leading from the area. Pus draining from the area. Swollen lymph nodes in the groin. A fever. Watch closely for changes in your health, and be sure to contact your doctor if you have any problems.

## 2022-08-02 NOTE — PROGRESS NOTES
Sheath pulled per ARIADNA Nunez RN, pressure held for 20 minutes. No sign of hematoma or external bleeding however BP  drops. BP cuff repositioned. HR drops slightly but remains stable.

## 2022-08-02 NOTE — PROGRESS NOTES
Patient returned to pre/post area. Alert and oriented, denies pain. Arterial sheath to left groin clean/dry/intact, no s/sx of hematoma. Will continue to monitor.

## 2022-08-02 NOTE — PROGRESS NOTES
Vital signs remain stable. No signs of active bleeding or hematoma noted to site. HOB raised 30 degrees.

## 2022-08-03 LAB
EKG ATRIAL RATE: 74 BPM
EKG P AXIS: 73 DEGREES
EKG P-R INTERVAL: 170 MS
EKG Q-T INTERVAL: 384 MS
EKG QRS DURATION: 74 MS
EKG QTC CALCULATION (BAZETT): 426 MS
EKG R AXIS: 70 DEGREES
EKG T AXIS: 59 DEGREES
EKG VENTRICULAR RATE: 74 BPM

## 2022-08-03 NOTE — PROCEDURES
artery was then performed using a 5 x 60 balloon. A 6 x  37 Visi-Pro stent was then deployed in this vessel. The same balloon  was then used to perform an angioplasty in the right common iliac  artery. This was then followed by placement of an 8 x 37 Visi-Pro stent  deployed at high pressure. Angiography at this point revealed a  satisfactory result at the stented areas, but it became apparent that  there was residual stenosis and dissection in the distal right external  iliac artery. After extensive manipulation, a Zilver PTX could  eventually be placed into this location and a 7 x 60 Zilver PTX was  deployed crossing the stenosis and dissection. Subsequent inflation was  performed with a 5-mm balloon at high pressure. The previously placed  Visi-Pro in the external iliac artery was re-dilated with the same  balloon and an 8-mm balloon was then used to repeat inflations in the  right common iliac artery. Final angiography was performed. The sheath  was withdrawn. A 6 x 11 sheath was placed into the left common femoral  artery. There was adequate hemostasis. There were no complications. Initial pressure measurement demonstrated a significant pressure  gradient across the right common and external iliac arteries which was  removed after treatment. ANGIOGRAPHY RESULTS:  ABDOMINAL AORTOGRAPHY:  Abdominal aortography demonstrated plaque  disease in the terminal aorta. There was no significant stenosis and no  evidence of aneurysm. RIGHT LOWER EXTREMITY:  There was a 70% ulcerated common iliac artery  stenosis at the onset of the procedure, which was reduced to 0% after  stent placement. RIGHT INTERNAL ILIAC ARTERY:  This vessel demonstrated plaque disease. RIGHT EXTERNAL ILIAC ARTERY:  This vessel had a 90% stenosis at the  onset of the procedure. The distal vessel had a hazy appearance and the  appearance consistent with dissection.   After angioplasty and stent  placement, there was 0% residual.    RIGHT COMMON FEMORAL ARTERY:  Normal.    RIGHT PROFUNDA:  Normal.    RIGHT SUPERFICIAL FEMORAL ARTERY:  Normal.    RIGHT POPLITEAL:  Normal.    The anterior tibial was normal in its proximal portion and the posterior  tibial was normal to the lower leg. LEFT LOWER EXTREMITY:  There was plaque disease in the common iliac  artery and severe plaque disease in the left internal iliac artery. The  left external iliac artery demonstrated 30% stenosis. The left common  femoral artery demonstrated 40% stenosis in proximity to the access  sheath, which precluded closure device use. Runoff throughout the  superficial femoral artery and popliteal as well as the tibial vessels  was normal to the lower leg. COMMENT:  The patient is a 71-year-old female who presented with  lifestyle-limiting claudication refractory to cilostazol. She will be  maintained on aspirin and Plavix. Of note, at the onset of the procedure, right pedal pulses were  detectable by Doppler only.   At the conclusion of the procedure, the  right posterior tibial was 2+ on palpation and the right dorsalis pedis  had a strong Doppler signal.        Minor Ferrer    D: 08/02/2022 14:00:38       T: 08/02/2022 14:03:59     [id number]  Job#: [id number]     Doc#: [id number]    CC:

## 2022-08-03 NOTE — PROGRESS NOTES
Inpatients must meet criteria 1 through 7.   1. Minimum 30 minutes after last dose of sedative medication, minimum 120 minutes after last dose of reversal agent. Yes   2. Systolic BP stable within 20 mmHg for 30 minutes & systolic BP between 90 & 595 or within 10 mmHg of baseline. Yes   3. Pulse between 60 and 100 or within 10 bpm of baseline. Yes   4. Spontaneous respiratory rate >/= 10 per minute. Yes   5. SaO2 >/= 95 or >/= baseline. Yes   6. Able to cough and swallow or return to baseline function. Yes   7. Alert and oriented or return to baseline mental status. Yes   8. Demonstrates controlled, coordinated movements, ambulates with steady gait, or return to baseline activity function. Yes   9. Minimal or no pain or nausea, or at a level tolerable and acceptable to patient. Yes   10. Takes and retains oral fluids as allowed. Yes   11. Procedural / perioperative site stable. Minimal or no bleeding. Yes   12. If GI endoscopy procedure, minimal or no abdominal distention or passing flatus. N/A   13. Written discharge instructions and emergency telephone number provided. Yes   14. Accompanied by a responsible adult. Yes   Adult patient discharged from facility without responsible person meets above criteria plus the following:   a) remains awake without stimulus for 30 minutes   b) oriented appropriate for age   c) all vital signs stable   d) no significant risk of losing protective reflexes   e) able to maintain pre-procedure mobility without assistance   f) no nausea or dizziness   g) transportation arrangements that do not require patient to operate motor Vehicle.    N/A

## 2022-08-03 NOTE — PROGRESS NOTES
Patient sat on edge of bed and then up to bathroom to get dressed. Patient denies dizziness/lightheadedness. No s/sx of hematoma noted.

## 2022-08-03 NOTE — H&P
Status: Not on file   Intimate Partner Violence:    Fear of Current or Ex-Partner: Not on file    Emotionally Abused: Not on file    Physically Abused: Not on file    Sexually Abused: Not on file   Housing Stability:    Unable to Pay for Housing in the Last Year: Not on file    Number of Places Lived in the Last Year: Not on file    Unstable Housing in the Last Year: Not on file         Family History   No family history on file. Past Medical History           Past Medical History:   Diagnosis Date    Allergic rhinitis       dust mites and nickel    Bursitis of left shoulder      Essential hypertension 1/31/2021     Dr. Devin Minor    Pseudoaneurysm of carotid artery ([facility] [address] 8/1/2021    Transient ischemic attack 04/19/2021    Wears dentures      Wears glasses           Current Facility-Administered Medications               Current Outpatient Medications   Medication Sig Dispense Refill    cilostazol (PLETAL) 100 MG tablet Take 1 tablet by mouth 2 times daily (Patient taking differently: Take 100 mg by mouth daily ) 60 tablet 3    fluocinolone (DERMOTIC) 0.01 % OIL oil Put 4 drops into the affected ear(s) twice daily x 2 weeks. After that can decrease use to 1-2 times a week for maintenance. 20 mL 2    amLODIPine (NORVASC) 10 MG tablet Take 1 tablet by mouth daily 90 tablet 3    pantoprazole (PROTONIX) 40 MG tablet Take 1 tablet by mouth daily 90 tablet 3    aspirin EC 81 MG EC tablet Take 1 tablet by mouth daily 90 tablet 3    losartan (COZAAR) 50 MG tablet Take 1 tablet by mouth daily 90 tablet 3    rosuvastatin (CRESTOR) 40 MG tablet Take 1 tablet by mouth daily 90 tablet 3    ciclopirox (PENLAC) 8 % solution Apply topically nightly.  1 each 5    fluticasone (FLONASE) 50 MCG/ACT nasal spray 2 sprays by Each Nostril route daily 1 Bottle 1    triamcinolone (KENALOG) 0.1 % ointment Apply topically 2 times daily 30 g 1    Probiotic Product (PROBIOTIC-10) CAPS Take by mouth daily        Multiple Vitamin (MULTI-VITAMIN DAILY PO) Take by mouth        Ascorbic Acid (VITAMIN C) 250 MG tablet Take 250 mg by mouth daily        Cholecalciferol (VITAMIN D3) 2000 units CAPS Take by mouth        acetaminophen (TYLENOL) 325 MG tablet Take 325 mg by mouth every 6 hours as needed for Pain          No current facility-administered medications for this visit. Physical findings:  General- no acute distress, oriented  HEENT - no xanthelasma, external ears normal  Neck- Supple, no thyromegaly  Skin- warm, dry, no skin breakdown or gangrene  Extremities - no edema     Pulses Right - Posterior tibial:    absent  Dorsalis pedis:  absent     Pulses Left -Posterior tibial: 2+  Dorsalis pedis:  absent        Assessment:          Encounter Diagnosis   Name Primary?     PAD (peripheral artery disease) (HCC) Yes      PAD with [place] 3 claudication  Plan of care:  7/12/22 angio with right leg intervention, left groin access  Angio using left groin access  No changes 8/2/22  Kristan Lam MD

## 2022-08-04 ENCOUNTER — TELEPHONE (OUTPATIENT)
Dept: FAMILY MEDICINE CLINIC | Age: 62
End: 2022-08-04

## 2022-08-04 NOTE — TELEPHONE ENCOUNTER
Patient had a procedure 08/02 - doesn't feel like she can go to work next week - unable to get Dr Feliz Sarah to do as he is on vacation - I explained that Dr Cheyanne Villareal was also on vacation this week and she would not see this message until Monday next week - please let patient know if she is willing to do her work slip    Health Maintenance   Topic Date Due    HIV screen  Never done    DTaP/Tdap/Td vaccine (1 - Tdap) Never done    Cervical cancer screen  Never done    Colorectal Cancer Screen  Never done    Breast cancer screen  01/24/2022    COVID-19 Vaccine (4 - Booster for Pfizer series) 02/28/2022    Flu vaccine (1) 09/01/2022    Lipids  02/24/2023    Depression Screen  02/24/2023    Shingles vaccine  Completed    Hepatitis C screen  Completed    Hepatitis A vaccine  Aged Out    Hepatitis B vaccine  Aged Out    Hib vaccine  Aged Out    Meningococcal (ACWY) vaccine  Aged Out    Pneumococcal 0-64 years Vaccine  Aged Out             (applicable per patient's age: Cancer Screenings, Depression Screening, Fall Risk Screening, Immunizations)    Hemoglobin A1C (%)   Date Value   04/20/2021 5.6   01/03/2020 5.3     LDL Cholesterol (mg/dL)   Date Value   02/24/2022 56     AST (U/L)   Date Value   02/24/2022 30     ALT (U/L)   Date Value   02/24/2022 29     BUN (mg/dL)   Date Value   07/12/2022 19      (goal A1C is < 7)   (goal LDL is <100) need 30-50% reduction from baseline     BP Readings from Last 3 Encounters:   08/02/22 124/67   07/12/22 (!) 109/56   07/06/22 124/64    (goal /80)      All Future Testing planned in [facility]:  Lab Frequency Next Occurrence   BUN & Creatinine Once 12/17/2023   DENNIS APURVA DIGITAL SCREEN BILATERAL Once 04/11/2022   CTA NECK W CONTRAST Once 04/04/2023   VL CHERYLE BILATERAL LIMITED 1-2 LEVELS Once 08/02/2022       Next Visit Date:  Future Appointments   Date Time Provider Zainab Kim   8/17/2022  1:15 PM Ruddy Mason MD [facility]            Patient Active Problem List:     Chronic eczematous otitis externa of both ears     Essential hypertension     Infarction of right basal ganglia (HCC)     Pseudoaneurysm of carotid artery (HCC)     Peripheral vascular disease (HCC)     Atherosclerosis of native arteries of extremities with intermittent claudication, bilateral legs (HCC)

## 2022-08-08 NOTE — TELEPHONE ENCOUNTER
Please contact patient to get more detail on her symptoms and why she feels she is not able to work yet

## 2022-08-17 ENCOUNTER — OFFICE VISIT (OUTPATIENT)
Dept: VASCULAR SURGERY | Age: 62
End: 2022-08-17
Payer: COMMERCIAL

## 2022-08-17 VITALS
HEIGHT: 66 IN | BODY MASS INDEX: 18.99 KG/M2 | DIASTOLIC BLOOD PRESSURE: 61 MMHG | WEIGHT: 118.2 LBS | HEART RATE: 97 BPM | TEMPERATURE: 96.6 F | RESPIRATION RATE: 18 BRPM | SYSTOLIC BLOOD PRESSURE: 115 MMHG

## 2022-08-17 DIAGNOSIS — I73.9 PAD (PERIPHERAL ARTERY DISEASE) (HCC): Primary | ICD-10-CM

## 2022-08-17 PROCEDURE — G8427 DOCREV CUR MEDS BY ELIG CLIN: HCPCS | Performed by: INTERNAL MEDICINE

## 2022-08-17 PROCEDURE — 99214 OFFICE O/P EST MOD 30 MIN: CPT | Performed by: INTERNAL MEDICINE

## 2022-08-17 PROCEDURE — 1036F TOBACCO NON-USER: CPT | Performed by: INTERNAL MEDICINE

## 2022-08-17 PROCEDURE — 3017F COLORECTAL CA SCREEN DOC REV: CPT | Performed by: INTERNAL MEDICINE

## 2022-08-17 PROCEDURE — G8420 CALC BMI NORM PARAMETERS: HCPCS | Performed by: INTERNAL MEDICINE

## 2022-08-17 NOTE — PROGRESS NOTES
Charlotte Welch was seen on 2022 for   Chief Complaint   Patient presents with    Follow-up     2 weeks post procedure right leg. States left leg is bothering her now   . 22 Pt was evaluated by Dr Jennifer Albright today for claudication (see note) after being referred by Dr Leslie Armenta. She has lifestyle limiting claudication right leg with an luz of 0.72. It inhibits her ability even to walk from her car to work. She has had heart racing on cilostazol, forcing her to decrease dose. Not helping with leg pain. Has headache as well. UPDATE 22 On 22 she had RCIA Visipro, REIA Visipro prox, and ZPTX distal. Right leg cramping is gone. She gets mild left leg pain with walking. Angio showed only  a mild narrowing in LCFA (right beside access sheath, requiring manual pull). Left groin healed well. Addendum. Her hx includes a carotid stent by Saravanan Aponte at [address] [place] [address] Dr EDMONDS 10mm X31 KOFI. 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 Followed by him. Social History     Socioeconomic History    Marital status:       Spouse name: Not on file    Number of children: Not on file    Years of education: Not on file    Highest education level: Not on file   Occupational History    Not on file   Tobacco Use    Smoking status: Former     Packs/day: 0.50     Years: 30.00     Pack years: 15.00     Types: Cigarettes     Quit date: 2021     Years since quittin.3    Smokeless tobacco: Never   Vaping Use    Vaping Use: Never used   Substance and Sexual Activity    Alcohol use: No    Drug use: No    Sexual activity: Not on file   Other Topics Concern    Not on file   Social History Narrative    Not on file     Social Determinants of Health     Financial Resource Strain: Low Risk     Difficulty of Paying Living Expenses: Not hard at all   Food Insecurity: No Food Insecurity    Worried About [address] in the Last Year: Never true    [address] in the Last Year: Never true Each Nostril route daily (Patient not taking: Reported on 8/17/2022) 1 Bottle 1     No current facility-administered medications for this visit. Physical findings:  General- no acute distress, oriented  HEENT - no xanthelasma, external ears normal  Neck- Supple, no thyromegaly  Carotids - no carotid bruits  Lungs - Clear to auscultation. CV- Regular rate and rythym, no murmurs rubs or gallops  Abdomen - Non tender, non distended, no bruits  Skin- warm, dry, no skin breakdown or gangrene  Extremities - no edema, fungal infx both sides toenails    Pulses Right - Posterior tibial:    2+  Dorsalis pedis:  absent     Pulses Left -  Posterior tibial:absent  Dorsalis pedis:  2+      Assessment:  1. PAD (peripheral artery disease) ([address]       Doing well after intervention  Maybe has some degree of statin related sx  Plan of care:  CHERYLE as previously ordered  Continue f/u with Dr Per Ibrahim  RTC 6 mo  30 min chart reviwe and pt encounter  Follow up and evaluate.        Electronically signed by Gem Beavers MD on 8/17/22 at 1:28 PM EDT

## 2022-08-17 NOTE — PROGRESS NOTES
Kit Wang was seen on 8/17/2022 for   Chief Complaint   Patient presents with    Follow-up     2 weeks post procedure right leg. States left leg is bothering her now   .                             REVIEW OF SYSTEMS    Constitutional Weight: absent, A, Fatigue: absent Fever: absent   HEENT Ears: diminished,Visual disturbance: absent Sore throat: absent,    Respiratory Shortness of breath: absent, Cough: absent;, Snoring: absent   Cardivascular Chest pain: absent,  Leg pain: present,Leg swelling:absent, Non-healing wound:absent    GI Diarrhea: absent, Abdominal Pain: absent    Urinary frequency: absent, Urinary incontinence: absent   Musculoskeletal Neck pain: absent, Back pain: absent, Restless Leg:absent     Dermatological Hair loss: absent, Skin changes: absent   Neurological  Sudden Loss of Vision in one eye:absent, Slurred Speech:absent, Weakness on one side of body: absent,Headache: absent   Psychiatric Anxiety: absent, Depression: absent   Hematologic Abnormal bleeding: absent,

## 2022-08-17 NOTE — PATIENT INSTRUCTIONS
SURVEY:    You may be receiving a survey from [employer] regarding your visit today. Please complete the survey to enable us to provide the highest quality of care to you and your family. If you cannot score us a very good on any question, please call the office to discuss how we could have made your experience a very good one. Thank you.

## 2022-08-23 ENCOUNTER — HOSPITAL ENCOUNTER (OUTPATIENT)
Dept: VASCULAR LAB | Age: 62
Discharge: HOME | End: 2022-08-25
Payer: COMMERCIAL

## 2022-08-23 DIAGNOSIS — I73.9 PAD (PERIPHERAL ARTERY DISEASE) (HCC): ICD-10-CM

## 2022-08-23 PROCEDURE — 93922 UPR/L XTREMITY ART 2 LEVELS: CPT

## 2022-08-25 ENCOUNTER — TELEPHONE (OUTPATIENT)
Dept: FAMILY MEDICINE CLINIC | Age: 62
End: 2022-08-25

## 2022-08-25 NOTE — TELEPHONE ENCOUNTER
Extreme fatigue, exhausted, stomach pain, diarrhea every morning but normal BM by the afternoon. brown in color. no fever. Still having arm weakness. Sx for 5 days      Last visit:  6/15/2022  Next Visit Date:    Future Appointments   Date Time Provider Zainab Judy   2/22/2023  1:00 PM Philippe Sanches MD [facility]         Medication List:  Prior to Admission medications    Medication Sig Start Date End Date Taking? Authorizing Provider   clopidogrel (PLAVIX) 75 MG tablet Take 75 mg by mouth daily    Historical Provider, MD   fluocinolone (DERMOTIC) 0.01 % OIL oil Put 4 drops into the affected ear(s) twice daily x 2 weeks. After that can decrease use to 1-2 times a week for maintenance. 4/29/22   Cuate Summers, DO   amLODIPine (NORVASC) 10 MG tablet Take 1 tablet by mouth daily 4/26/22   Cuate Summers, DO   pantoprazole (PROTONIX) 40 MG tablet Take 1 tablet by mouth daily 4/26/22   Cuate Summers, DO   aspirin EC 81 MG EC tablet Take 1 tablet by mouth daily 4/26/22   Cuate Summers, DO   losartan (COZAAR) 50 MG tablet Take 1 tablet by mouth daily 4/26/22   Cuate Summers, DO   rosuvastatin (CRESTOR) 40 MG tablet Take 1 tablet by mouth daily 4/26/22   Cuate Summers, DO   ciclopirox (PENLAC) 8 % solution Apply topically nightly.  4/26/22   Cuate Summers, DO   fluticasone (FLONASE) 50 MCG/ACT nasal spray 2 sprays by Each Nostril route daily  Patient not taking: Reported on 8/17/2022 7/29/21   Cuate Summers, DO   triamcinolone (KENALOG) 0.1 % ointment Apply topically 2 times daily 7/29/21   Cuate Summers, DO   Probiotic Product (PROBIOTIC-10) CAPS Take by mouth daily    Historical Provider, MD   Multiple Vitamin (MULTI-VITAMIN DAILY PO) Take by mouth    Historical Provider, MD   Ascorbic Acid (VITAMIN C) 250 MG tablet Take 250 mg by mouth daily    Historical Provider, MD   Cholecalciferol (VITAMIN D3) 2000 units CAPS Take by mouth    Historical Provider, MD   acetaminophen (TYLENOL)

## 2022-08-30 ENCOUNTER — OFFICE VISIT (OUTPATIENT)
Dept: FAMILY MEDICINE CLINIC | Age: 62
End: 2022-08-30
Payer: COMMERCIAL

## 2022-08-30 VITALS
BODY MASS INDEX: 18.8 KG/M2 | HEART RATE: 98 BPM | WEIGHT: 117 LBS | HEIGHT: 66 IN | OXYGEN SATURATION: 98 % | SYSTOLIC BLOOD PRESSURE: 126 MMHG | DIASTOLIC BLOOD PRESSURE: 64 MMHG

## 2022-08-30 DIAGNOSIS — I10 ESSENTIAL HYPERTENSION: ICD-10-CM

## 2022-08-30 DIAGNOSIS — M25.511 CHRONIC RIGHT SHOULDER PAIN: Primary | ICD-10-CM

## 2022-08-30 DIAGNOSIS — M79.662 PAIN OF LEFT CALF: ICD-10-CM

## 2022-08-30 DIAGNOSIS — G89.29 CHRONIC RIGHT SHOULDER PAIN: Primary | ICD-10-CM

## 2022-08-30 DIAGNOSIS — I73.9 PERIPHERAL VASCULAR DISEASE (HCC): ICD-10-CM

## 2022-08-30 PROCEDURE — 20610 DRAIN/INJ JOINT/BURSA W/O US: CPT | Performed by: FAMILY MEDICINE

## 2022-08-30 PROCEDURE — G8427 DOCREV CUR MEDS BY ELIG CLIN: HCPCS | Performed by: FAMILY MEDICINE

## 2022-08-30 PROCEDURE — 3017F COLORECTAL CA SCREEN DOC REV: CPT | Performed by: FAMILY MEDICINE

## 2022-08-30 PROCEDURE — 99214 OFFICE O/P EST MOD 30 MIN: CPT | Performed by: FAMILY MEDICINE

## 2022-08-30 PROCEDURE — 1036F TOBACCO NON-USER: CPT | Performed by: FAMILY MEDICINE

## 2022-08-30 PROCEDURE — G8420 CALC BMI NORM PARAMETERS: HCPCS | Performed by: FAMILY MEDICINE

## 2022-08-30 RX ORDER — TRIAMCINOLONE ACETONIDE 40 MG/ML
40 INJECTION, SUSPENSION INTRA-ARTICULAR; INTRAMUSCULAR ONCE
Status: COMPLETED | OUTPATIENT
Start: 2022-08-30 | End: 2022-08-30

## 2022-08-30 RX ADMIN — TRIAMCINOLONE ACETONIDE 40 MG: 40 INJECTION, SUSPENSION INTRA-ARTICULAR; INTRAMUSCULAR at 14:40

## 2022-08-30 ASSESSMENT — PATIENT HEALTH QUESTIONNAIRE - PHQ9
SUM OF ALL RESPONSES TO PHQ QUESTIONS 1-9: 0
SUM OF ALL RESPONSES TO PHQ QUESTIONS 1-9: 0
2. FEELING DOWN, DEPRESSED OR HOPELESS: 0
1. LITTLE INTEREST OR PLEASURE IN DOING THINGS: 0
SUM OF ALL RESPONSES TO PHQ QUESTIONS 1-9: 0
SUM OF ALL RESPONSES TO PHQ9 QUESTIONS 1 & 2: 0
SUM OF ALL RESPONSES TO PHQ QUESTIONS 1-9: 0

## 2022-08-30 NOTE — PROGRESS NOTES
Name: Mecca Mixon  : 1960         Chief Complaint:     Chief Complaint   Patient presents with    Arm Pain     Right arm pain or occasional aching, sometimes left arm aches    Leg Pain     Left calf pain, saw vascular for the right, but now the left hurts       History of Present Illness:      Mecca Mixon is a 65 y.o.  female who presents with Arm Pain (Right arm pain or occasional aching, sometimes left arm aches) and Leg Pain (Left calf pain, saw vascular for the right, but now the left hurts)      HPI    Recent diarrhea for a few days, resolved. C/o aching in RUE, worst in shoulder and lateral upper arm but also feels in elbow and forearm. L calf pain, feels similar to the way the R one had prior to angioplasty and stenting but not as bad, pain with walking to and from workplace which is a long walk. Taking meds as prescribed incl plavix which she'll just take for another month. She has absolutely no pain in the R leg anymore. HTN and HLD, changed meds to taking them in the afternoon when she wakes up. Medical History:     Patient Active Problem List   Diagnosis    Chronic eczematous otitis externa of both ears    Essential hypertension    Infarction of right basal ganglia (HCC)    Pseudoaneurysm of carotid artery (HCC)    Peripheral vascular disease (HCC)    Atherosclerosis of native arteries of extremities with intermittent claudication, bilateral legs (HCC)       Medications:       Prior to Admission medications    Medication Sig Start Date End Date Taking? Authorizing Provider   clopidogrel (PLAVIX) 75 MG tablet Take 75 mg by mouth daily   Yes Historical Provider, MD   fluocinolone (DERMOTIC) 0.01 % OIL oil Put 4 drops into the affected ear(s) twice daily x 2 weeks. After that can decrease use to 1-2 times a week for maintenance.  22  Yes Addie Hutchison,    amLODIPine (NORVASC) 10 MG tablet Take 1 tablet by mouth daily 22  Yes Addie Hutchison DO   pantoprazole range of motion of left ankle and no pain with passive dorsiflexion. Skin:     General: Skin is warm and dry. Neurological:      Mental Status: She is alert and oriented to person, place, and time. Psychiatric:         Mood and Affect: Mood normal.         Behavior: Behavior normal.     Right shoulder steroid injection: Verbal consent obtained. Patient in seated position with her right posterior shoulder exposed. Identified site, marked, prepped with Betadine and alcohol. Injected Kenalog 40 mg and 1% plain lidocaine 2 mL using 1.5 inch, 25-gauge needle. Tolerated well, no complications. Data:     Lab Results   Component Value Date/Time     07/12/2022 11:45 AM    K 4.4 07/12/2022 11:45 AM     07/12/2022 11:45 AM    CO2 24 07/12/2022 11:45 AM    BUN 19 07/12/2022 11:45 AM    CREATININE 0.76 07/12/2022 11:45 AM    GLUCOSE 90 07/12/2022 11:45 AM    PROT 7.7 02/24/2022 09:16 AM    LABALBU 4.7 02/24/2022 09:16 AM    BILITOT 0.31 02/24/2022 09:16 AM    ALKPHOS 101 02/24/2022 09:16 AM    AST 30 02/24/2022 09:16 AM    ALT 29 02/24/2022 09:16 AM     Lab Results   Component Value Date/Time    WBC 7.3 07/12/2022 11:45 AM    RBC 4.30 07/12/2022 11:45 AM    HGB 12.9 07/12/2022 11:45 AM    HCT 38.5 07/12/2022 11:45 AM    MCV 89.5 07/12/2022 11:45 AM    MCH 30.0 07/12/2022 11:45 AM    MCHC 33.5 07/12/2022 11:45 AM    RDW 14.3 07/12/2022 11:45 AM     07/12/2022 11:45 AM    MPV 8.7 07/12/2022 11:45 AM     Lab Results   Component Value Date/Time    TSH 2.05 04/19/2021 07:00 PM     Lab Results   Component Value Date/Time    CHOL 129 02/24/2022 09:16 AM    HDL 53 02/24/2022 09:16 AM    LABA1C 5.6 04/20/2021 05:00 AM         Assessment & Plan:        Diagnosis Orders   1. Chronic right shoulder pain  triamcinolone acetonide (KENALOG-40) injection 40 mg    20610 - NE DRAIN/INJECT LARGE JOINT/BURSA      2. Pain of left calf        3. Peripheral vascular disease ([facility] [address]        4.  Essential hypertension 1.  Chronic right shoulder pain, history of same on the left with MRI showing rotator cuff tendinopathy, subacromial bursitis. Suspect that she has the same thing on the right. Steroid injections have helped somewhat in the past, so I offered that and patient did wish to proceed. Injection was performed without difficulty. Advised restarting home exercises, doing them more consistently. 2.  Left calf pain of uncertain etiology. Patient does have peripheral artery disease and has been having claudication with walking. This has resolved on the right lower extremity status post stenting but pain persists on the left. She does have 2+ pedal pulses on the left and had normal ABIs recently. Advised that she still follow-up with vascular. 3.  PVD, continue statin and antiplatelet treatment, Plavix for the last month.   4.  hypertension controlled, continue current Rx    Requested Prescriptions      No prescriptions requested or ordered in this encounter         There are no Patient Instructions on file for this visit.      signed by Felicia Dodd DO on 9/1/2022 at 4:54 PM  [address] [address]  Dept: [phone]

## 2022-09-29 ENCOUNTER — TELEPHONE (OUTPATIENT)
Dept: FAMILY MEDICINE CLINIC | Age: 62
End: 2022-09-29

## 2022-09-29 DIAGNOSIS — J02.9 SORE THROAT: Primary | ICD-10-CM

## 2022-09-29 NOTE — TELEPHONE ENCOUNTER
Last OV: 8/30/2022     Next scheduled apt: Visit date not found      Patient called stating she has been experiencing a sore throat, cough, body aches, congestion and exhaustion. No sob or fever. Symptoms started yesterday. Currently taking Dayquil for the symptoms. Patient requesting covid order.

## 2022-09-30 ENCOUNTER — HOSPITAL ENCOUNTER (OUTPATIENT)
Dept: PREADMISSION TESTING | Age: 62
Setting detail: SPECIMEN
Discharge: HOME | End: 2022-09-30
Payer: COMMERCIAL

## 2022-09-30 DIAGNOSIS — J02.9 SORE THROAT: ICD-10-CM

## 2022-09-30 LAB
SARS-COV-2, RAPID: NOT DETECTED
SPECIMEN DESCRIPTION: NORMAL

## 2022-09-30 PROCEDURE — C9803 HOPD COVID-19 SPEC COLLECT: HCPCS

## 2022-09-30 PROCEDURE — 87635 SARS-COV-2 COVID-19 AMP PRB: CPT

## 2022-10-04 ENCOUNTER — OFFICE VISIT (OUTPATIENT)
Dept: FAMILY MEDICINE CLINIC | Age: 62
End: 2022-10-04
Payer: COMMERCIAL

## 2022-10-04 VITALS
HEART RATE: 90 BPM | BODY MASS INDEX: 18.48 KG/M2 | DIASTOLIC BLOOD PRESSURE: 60 MMHG | HEIGHT: 66 IN | WEIGHT: 115 LBS | OXYGEN SATURATION: 99 % | SYSTOLIC BLOOD PRESSURE: 128 MMHG

## 2022-10-04 DIAGNOSIS — J06.9 ACUTE URI: Primary | ICD-10-CM

## 2022-10-04 PROCEDURE — G8420 CALC BMI NORM PARAMETERS: HCPCS | Performed by: FAMILY MEDICINE

## 2022-10-04 PROCEDURE — 3017F COLORECTAL CA SCREEN DOC REV: CPT | Performed by: FAMILY MEDICINE

## 2022-10-04 PROCEDURE — G8484 FLU IMMUNIZE NO ADMIN: HCPCS | Performed by: FAMILY MEDICINE

## 2022-10-04 PROCEDURE — 99213 OFFICE O/P EST LOW 20 MIN: CPT | Performed by: FAMILY MEDICINE

## 2022-10-04 PROCEDURE — G8427 DOCREV CUR MEDS BY ELIG CLIN: HCPCS | Performed by: FAMILY MEDICINE

## 2022-10-04 PROCEDURE — 1036F TOBACCO NON-USER: CPT | Performed by: FAMILY MEDICINE

## 2022-10-04 RX ORDER — AZITHROMYCIN 250 MG/1
TABLET, FILM COATED ORAL
Qty: 6 TABLET | Refills: 0 | Status: SHIPPED | OUTPATIENT
Start: 2022-10-04 | End: 2022-10-24

## 2022-10-04 NOTE — LETTER
[facility] LILA Guevara [address] [place] [id number]  Phone: [phone]  Fax: Matt [phone], IV        October 4, 2022     Patient: Eda Arteaga   YOB: 1960   Date of Visit: 10/4/2022       To Whom It May Concern: It is my medical opinion that Bonnie Romero may return to work on 10/6. She missed work 9/29-10/5 due to illness. If you have any questions or concerns, please don't hesitate to call.     Sincerely,        Kathrin Lou DO

## 2022-10-04 NOTE — PROGRESS NOTES
Name: Charito Fitzpatrick  : 1960         Chief Complaint:     Chief Complaint   Patient presents with    Cough     States that she had a sore throat 1 day last, week and then got a cough and some congestion, no fever. She has post nasal drainage and cough. Using otc cold meds with little relief. Hasn't been to work since last Thursday       History of Present Illness:      Charito Fitzpatrick is a 65 y.o.  female who presents with Cough (States that she had a sore throat 1 day last, week and then got a cough and some congestion, no fever. She has post nasal drainage and cough. Using otc cold meds with little relief. Hasn't been to work since last Thursday)      HPI    Started with sore throat 5 days ago, little bit of cough and congestion. Feels worse now, got worse over the weekend. Head congestion. Chest congestion now and rattly cough, not bringing anything up yet. Fatigue. Using maine-seltzer plus nighttime gel caps, generic tylenol flu, theraflu packet before bed. No GI symptoms. Has had 2 neg COVID tests incl yesterday. Medical History:     Patient Active Problem List   Diagnosis    Chronic eczematous otitis externa of both ears    Essential hypertension    Infarction of right basal ganglia (HCC)    Pseudoaneurysm of carotid artery (HCC)    Peripheral vascular disease (HCC)    Atherosclerosis of native arteries of extremities with intermittent claudication, bilateral legs (HCC)       Medications:       Prior to Admission medications    Medication Sig Start Date End Date Taking? Authorizing Provider   azithromycin (ZITHROMAX) 250 MG tablet 500mg on day 1 followed by 250mg on days 2 - 5 10/4/22  Yes Osker Phalen,    clopidogrel (PLAVIX) 75 MG tablet Take 75 mg by mouth daily   Yes Historical Provider, MD   fluocinolone (DERMOTIC) 0.01 % OIL oil Put 4 drops into the affected ear(s) twice daily x 2 weeks. After that can decrease use to 1-2 times a week for maintenance.  22  Yes Osker Phalen, DO   amLODIPine (NORVASC) 10 MG tablet Take 1 tablet by mouth daily 4/26/22  Yes Tony Denson, DO   pantoprazole (PROTONIX) 40 MG tablet Take 1 tablet by mouth daily 4/26/22  Yes Tony Denson, DO   aspirin EC 81 MG EC tablet Take 1 tablet by mouth daily 4/26/22  Yes Tony Denson, DO   losartan (COZAAR) 50 MG tablet Take 1 tablet by mouth daily 4/26/22  Yes Tony Denson, DO   rosuvastatin (CRESTOR) 40 MG tablet Take 1 tablet by mouth daily 4/26/22  Yes Tony Denson, DO   ciclopirox (PENLAC) 8 % solution Apply topically nightly. 4/26/22  Yes Tony Denson, DO   fluticasone (FLONASE) 50 MCG/ACT nasal spray 2 sprays by Each Nostril route daily 7/29/21  Yes Tony Denson, DO   triamcinolone (KENALOG) 0.1 % ointment Apply topically 2 times daily 7/29/21  Yes Tony Denson, DO   Probiotic Product (PROBIOTIC-10) CAPS Take by mouth daily   Yes Historical Provider, MD   Multiple Vitamin (MULTI-VITAMIN DAILY PO) Take by mouth   Yes Historical Provider, MD   Ascorbic Acid (VITAMIN C) 250 MG tablet Take 250 mg by mouth daily   Yes Historical Provider, MD   Cholecalciferol (VITAMIN D3) 2000 units CAPS Take by mouth   Yes Historical Provider, MD   acetaminophen (TYLENOL) 325 MG tablet Take 325 mg by mouth every 6 hours as needed for Pain   Yes Historical Provider, MD        Allergies: Other    Physical Exam:     Vitals:  /60   Pulse 90   Ht 5' 6\" (1.676 m)   Wt 115 lb (52.2 kg)   LMP  (LMP Unknown)   SpO2 99%   BMI 18.56 kg/m²   Physical Exam  Vitals and nursing note reviewed. Constitutional:       General: She is not in acute distress. Appearance: She is well-developed. HENT:      Head: Normocephalic and atraumatic. Right Ear: Tympanic membrane and ear canal normal.      Left Ear: Tympanic membrane and ear canal normal.      Nose: Nose normal.      Mouth/Throat:      Mouth: Mucous membranes are moist.      Pharynx: Oropharynx is clear.  Posterior oropharyngeal erythema (mild) present. Eyes:      Conjunctiva/sclera: Conjunctivae normal.   Cardiovascular:      Rate and Rhythm: Normal rate and regular rhythm. Heart sounds: Normal heart sounds. Pulmonary:      Effort: Pulmonary effort is normal.      Breath sounds: Normal breath sounds. No wheezing or rales. Musculoskeletal:      Cervical back: Neck supple. Lymphadenopathy:      Cervical: No cervical adenopathy. Skin:     General: Skin is warm and dry. Neurological:      Mental Status: She is alert and oriented to person, place, and time. Psychiatric:         Mood and Affect: Mood normal.         Behavior: Behavior normal.       Data:     Lab Results   Component Value Date/Time     07/12/2022 11:45 AM    K 4.4 07/12/2022 11:45 AM     07/12/2022 11:45 AM    CO2 24 07/12/2022 11:45 AM    BUN 19 07/12/2022 11:45 AM    CREATININE 0.76 07/12/2022 11:45 AM    GLUCOSE 90 07/12/2022 11:45 AM    PROT 7.7 02/24/2022 09:16 AM    LABALBU 4.7 02/24/2022 09:16 AM    BILITOT 0.31 02/24/2022 09:16 AM    ALKPHOS 101 02/24/2022 09:16 AM    AST 30 02/24/2022 09:16 AM    ALT 29 02/24/2022 09:16 AM     Lab Results   Component Value Date/Time    WBC 7.3 07/12/2022 11:45 AM    RBC 4.30 07/12/2022 11:45 AM    HGB 12.9 07/12/2022 11:45 AM    HCT 38.5 07/12/2022 11:45 AM    MCV 89.5 07/12/2022 11:45 AM    MCH 30.0 07/12/2022 11:45 AM    MCHC 33.5 07/12/2022 11:45 AM    RDW 14.3 07/12/2022 11:45 AM     07/12/2022 11:45 AM    MPV 8.7 07/12/2022 11:45 AM     Lab Results   Component Value Date/Time    TSH 2.05 04/19/2021 07:00 PM     Lab Results   Component Value Date/Time    CHOL 129 02/24/2022 09:16 AM    HDL 53 02/24/2022 09:16 AM    LABA1C 5.6 04/20/2021 05:00 AM         Assessment & Plan:        Diagnosis Orders   1. Acute URI  azithromycin (ZITHROMAX) 250 MG tablet        URI symptoms for past few days, fair amount of cough per her description and could be a mild COPD exacerbation. Treating with antibiotic.   Off work today and tomorrow.       Requested Prescriptions     Signed Prescriptions Disp Refills    azithromycin (ZITHROMAX) 250 MG tablet 6 tablet 0     Simg on day 1 followed by 250mg on days 2 - 5           signed by Jacoby Pearce DO on 10/7/2022 at [address]  Dept: [phone]

## 2022-10-07 ENCOUNTER — TELEPHONE (OUTPATIENT)
Dept: FAMILY MEDICINE CLINIC | Age: 62
End: 2022-10-07

## 2022-10-07 NOTE — TELEPHONE ENCOUNTER
Patient is asking for a work slip. She missed work on 10/07/22 due to her note feeling better. She will need it to say return 10/08/22.       Health Maintenance   Topic Date Due    HIV screen  Never done    DTaP/Tdap/Td vaccine (1 - Tdap) Never done    Cervical cancer screen  Never done    Colorectal Cancer Screen  Never done    Breast cancer screen  01/24/2022    COVID-19 Vaccine (4 - Booster for Pfizer series) 02/28/2022    Flu vaccine (1) 08/01/2022    Lipids  02/24/2023    Depression Screen  08/30/2023    Shingles vaccine  Completed    Hepatitis C screen  Completed    Hepatitis A vaccine  Aged Out    Hib vaccine  Aged Out    Meningococcal (ACWY) vaccine  Aged Out    Pneumococcal 0-64 years Vaccine  Aged Out             (applicable per patient's age: Cancer Screenings, Depression Screening, Fall Risk Screening, Immunizations)    Hemoglobin A1C (%)   Date Value   04/20/2021 5.6   01/03/2020 5.3     LDL Cholesterol (mg/dL)   Date Value   02/24/2022 56     AST (U/L)   Date Value   02/24/2022 30     ALT (U/L)   Date Value   02/24/2022 29     BUN (mg/dL)   Date Value   07/12/2022 19      (goal A1C is < 7)   (goal LDL is <100) need 30-50% reduction from baseline     BP Readings from Last 3 Encounters:   10/04/22 128/60   08/30/22 126/64   08/17/22 115/61    (goal /80)      All Future Testing planned in [facility]:  Lab Frequency Next Occurrence   BUN & Creatinine Once 12/17/2023   DENNIS APURVA DIGITAL SCREEN BILATERAL Once 04/11/2022   CTA NECK W CONTRAST Once 04/04/2023       Next Visit Date:  Future Appointments   Date Time Provider Zainab Kim   2/22/2023  1:00 PM [facility]            Patient Active Problem List:     Chronic eczematous otitis externa of both ears     Essential hypertension     Infarction of right basal ganglia (HCC)     Pseudoaneurysm of carotid artery ([address]     Peripheral vascular disease ([address]     Atherosclerosis of native arteries of extremities with intermittent claudication, bilateral legs ([facility] [address]

## 2022-10-24 ENCOUNTER — TELEPHONE (OUTPATIENT)
Dept: FAMILY MEDICINE CLINIC | Age: 62
End: 2022-10-24

## 2022-10-24 RX ORDER — FLUTICASONE PROPIONATE 50 MCG
2 SPRAY, SUSPENSION (ML) NASAL DAILY
Qty: 16 G | Refills: 0 | Status: SHIPPED | OUTPATIENT
Start: 2022-10-24

## 2022-10-24 RX ORDER — AMOXICILLIN AND CLAVULANATE POTASSIUM 875; 125 MG/1; MG/1
1 TABLET, FILM COATED ORAL 2 TIMES DAILY
Qty: 20 TABLET | Refills: 0 | Status: SHIPPED | OUTPATIENT
Start: 2022-10-24 | End: 2022-11-03

## 2022-10-24 NOTE — TELEPHONE ENCOUNTER
Santino Cartagena was in the office on 10/4 for an Acute URI. She was prescribed a zpack. She is 3 weeks into it and she said she is not any better. She still has the head congestion, runny nose and a cough. She said no she does not have COVID. Can she get something else prescribed to her? Please let Santino Cartagena know.     RA-will    Health Maintenance   Topic Date Due    HIV screen  Never done    DTaP/Tdap/Td vaccine (1 - Tdap) Never done    Cervical cancer screen  Never done    Colorectal Cancer Screen  Never done    COVID-19 Vaccine (4 - Booster for Pfizer series) 12/24/2021    Breast cancer screen  01/24/2022    Flu vaccine (1) 08/01/2022    Lipids  02/24/2023    Depression Screen  08/30/2023    Shingles vaccine  Completed    Hepatitis C screen  Completed    Hepatitis A vaccine  Aged Out    Hib vaccine  Aged Out    Meningococcal (ACWY) vaccine  Aged Out    Pneumococcal 0-64 years Vaccine  Aged Out             (applicable per patient's age: Cancer Screenings, Depression Screening, Fall Risk Screening, Immunizations)    Hemoglobin A1C (%)   Date Value   04/20/2021 5.6   01/03/2020 5.3     LDL Cholesterol (mg/dL)   Date Value   02/24/2022 56     AST (U/L)   Date Value   02/24/2022 30     ALT (U/L)   Date Value   02/24/2022 29     BUN (mg/dL)   Date Value   07/12/2022 19      (goal A1C is < 7)   (goal LDL is <100) need 30-50% reduction from baseline     BP Readings from Last 3 Encounters:   10/04/22 128/60   08/30/22 126/64   08/17/22 115/61    (goal /80)      All Future Testing planned in [facility]:  Lab Frequency Next Occurrence   BUN & Creatinine Once 12/17/2023   DENNIS APURVA DIGITAL SCREEN BILATERAL Once 04/11/2022   CTA NECK W CONTRAST Once 04/04/2023       Next Visit Date:  Future Appointments   Date Time Provider Zainab Kim   2/22/2023  1:00 PM [facility]            Patient Active Problem List:     Chronic eczematous otitis externa of both ears     Essential hypertension     Infarction of right basal ganglia (HCC)     Pseudoaneurysm of carotid artery (HCC)     Peripheral vascular disease (HCC)     Atherosclerosis of native arteries of extremities with intermittent claudication, bilateral legs (HCC)

## 2023-02-22 ENCOUNTER — OFFICE VISIT (OUTPATIENT)
Dept: VASCULAR SURGERY | Age: 63
End: 2023-02-22
Payer: COMMERCIAL

## 2023-02-22 VITALS
RESPIRATION RATE: 18 BRPM | HEART RATE: 99 BPM | BODY MASS INDEX: 17.98 KG/M2 | TEMPERATURE: 97.6 F | DIASTOLIC BLOOD PRESSURE: 66 MMHG | SYSTOLIC BLOOD PRESSURE: 121 MMHG | HEIGHT: 66 IN | WEIGHT: 111.9 LBS

## 2023-02-22 DIAGNOSIS — R29.898 ARM WEAKNESS: ICD-10-CM

## 2023-02-22 DIAGNOSIS — I73.9 PAD (PERIPHERAL ARTERY DISEASE) (HCC): Primary | ICD-10-CM

## 2023-02-22 PROCEDURE — 99214 OFFICE O/P EST MOD 30 MIN: CPT | Performed by: INTERNAL MEDICINE

## 2023-02-22 PROCEDURE — 1036F TOBACCO NON-USER: CPT | Performed by: INTERNAL MEDICINE

## 2023-02-22 PROCEDURE — G8419 CALC BMI OUT NRM PARAM NOF/U: HCPCS | Performed by: INTERNAL MEDICINE

## 2023-02-22 PROCEDURE — 3074F SYST BP LT 130 MM HG: CPT | Performed by: INTERNAL MEDICINE

## 2023-02-22 PROCEDURE — G8427 DOCREV CUR MEDS BY ELIG CLIN: HCPCS | Performed by: INTERNAL MEDICINE

## 2023-02-22 PROCEDURE — G8484 FLU IMMUNIZE NO ADMIN: HCPCS | Performed by: INTERNAL MEDICINE

## 2023-02-22 PROCEDURE — 3078F DIAST BP <80 MM HG: CPT | Performed by: INTERNAL MEDICINE

## 2023-02-22 PROCEDURE — 3017F COLORECTAL CA SCREEN DOC REV: CPT | Performed by: INTERNAL MEDICINE

## 2023-02-22 NOTE — PATIENT INSTRUCTIONS
SURVEY:    You may be receiving a survey from [employer] regarding your visit today. Please complete the survey to enable us to provide the highest quality of care to you and your family. If you cannot score us a very good on any question, please call the office to discuss how we could have made your experience a very good one. Thank you.

## 2023-02-22 NOTE — PROGRESS NOTES
Elin Portillo was seen on 2/22/2023 for   Chief Complaint   Patient presents with    Follow-up     Right leg doing fine  Some pain in left leg   .                             REVIEW OF SYSTEMS    Constitutional Weight: present, A, Fatigue: present Fever: absent   HEENT Ears: ringing,Visual disturbance: absent Sore throat: absent,    Respiratory Shortness of breath: absent, Cough: absent;, Snoring: absent   Cardivascular Chest pain: absent,  Leg pain: present,Leg swelling:absent, Non-healing wound:absent    GI Diarrhea: absent, Abdominal Pain: absent    Urinary frequency: absent, Urinary incontinence: absent   Musculoskeletal Neck pain: absent, Back pain: absent, Restless Leg:absent     Dermatological Hair loss: absent, Skin changes: absent   Neurological  Sudden Loss of Vision in one eye:absent, Slurred Speech:absent, Weakness on one side of body: absent,Headache: absent   Psychiatric Anxiety: absent, Depression: present   Hematologic Abnormal bleeding: absent,

## 2023-02-22 NOTE — PROGRESS NOTES
Swati Grant was seen on 2023 for   Chief Complaint   Patient presents with    Follow-up     Right leg doing fine  Some pain in left leg   . 22 Pt was evaluated by Dr Otis Byers today for claudication (see note) after being referred by Dr Santos Zimmerman. She has lifestyle limiting claudication right leg with an luz of 0.72. It inhibits her ability even to walk from her car to work. She has had heart racing on cilostazol, forcing her to decrease dose. Not helping with leg pain. Has headache as well. UPDATE 22 On 22 she had RCIA Visipro, REIA Visipro prox, and ZPTX distal. Right leg cramping is gone. She gets mild left leg pain with walking. Angio showed only  a mild narrowing in LCFA (right beside access sheath, requiring manual pull). Left groin healed well. Addendum. Her hx includes a carotid stent by Catarina Lovelace at [address] [place] [address] Dr EDMONDS 10mm X31 WALLSTENT. 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 Followed by him. UPDATE 23 Arms feel weak, can't seem to gain strength. Don't cramp. Numbness in hands. Right leg feels fine. Left calf has mild discomfort with walking, but no activity limitation. Angio was remarkable only for mild cfa narrowing on left. Has been on crestor 2 yrs. Social History     Socioeconomic History    Marital status:       Spouse name: Not on file    Number of children: Not on file    Years of education: Not on file    Highest education level: Not on file   Occupational History    Not on file   Tobacco Use    Smoking status: Former     Packs/day: 0.50     Years: 30.00     Pack years: 15.00     Types: Cigarettes     Quit date: 2021     Years since quittin.8    Smokeless tobacco: Never   Vaping Use    Vaping Use: Never used   Substance and Sexual Activity    Alcohol use: No    Drug use: No    Sexual activity: Not on file   Other Topics Concern    Not on file   Social History Narrative    Not on file     Social Determinants of Health Financial Resource Strain: Low Risk     Difficulty of Paying Living Expenses: Not hard at all   Food Insecurity: No Food Insecurity    Worried About Running Out of Food in the Last Year: Never true    Ran Out of Food in the Last Year: Never true   Transportation Needs: Not on file   Physical Activity: Not on file   Stress: Not on file   Social Connections: Not on file   Intimate Partner Violence: Not on file   Housing Stability: Not on file     No family history on file. Past Medical History:   Diagnosis Date    Allergic rhinitis     dust mites and nickel    Bursitis of left shoulder     Cerebral artery occlusion with cerebral infarction [facility])     Essential hypertension 01/31/2021    Dr. Lani Kim    History of femoral angiogram 08/02/2022    [facility]    Hyperlipidemia     Pseudoaneurysm of carotid artery ([address] 08/01/2021    Transient ischemic attack 04/19/2021    Wears dentures     Wears glasses      Current Outpatient Medications   Medication Sig Dispense Refill    fluticasone (FLONASE) 50 MCG/ACT nasal spray 2 sprays by Nasal route daily 16 g 0    fluocinolone (DERMOTIC) 0.01 % OIL oil Put 4 drops into the affected ear(s) twice daily x 2 weeks. After that can decrease use to 1-2 times a week for maintenance. 20 mL 2    amLODIPine (NORVASC) 10 MG tablet Take 1 tablet by mouth daily 90 tablet 3    pantoprazole (PROTONIX) 40 MG tablet Take 1 tablet by mouth daily 90 tablet 3    aspirin EC 81 MG EC tablet Take 1 tablet by mouth daily 90 tablet 3    losartan (COZAAR) 50 MG tablet Take 1 tablet by mouth daily 90 tablet 3    rosuvastatin (CRESTOR) 40 MG tablet Take 1 tablet by mouth daily 90 tablet 3    ciclopirox (PENLAC) 8 % solution Apply topically nightly.  1 each 5    triamcinolone (KENALOG) 0.1 % ointment Apply topically 2 times daily 30 g 1    Probiotic Product (PROBIOTIC-10) CAPS Take by mouth daily      Multiple Vitamin (MULTI-VITAMIN DAILY PO) Take by mouth      Ascorbic Acid (VITAMIN C) 250 MG tablet Take 250 mg by mouth daily      Cholecalciferol (VITAMIN D3) 2000 units CAPS Take by mouth      acetaminophen (TYLENOL) 325 MG tablet Take 325 mg by mouth every 6 hours as needed for Pain       No current facility-administered medications for this visit. Physical findings:  General- no acute distress, oriented  HEENT - no xanthelasma, external ears normal  Neck- Supple, no thyromegaly  Carotids - no carotid bruits  Lungs - Clear to auscultation. Decreased breath sounds. CV- Regular rate and rythym, no murmurs rubs or gallops  Abdomen - Non tender, non distended, no bruits  Skin- warm, dry, no skin breakdown or gangrene  Extremities - no edema    Pulses Right - Posterior tibial:    2+  Dorsalis pedis:  absent  Radial 2+     Pulses Left -Posterior tibial: absent  Dorsalis pedis:  2+  Radial 2+      Assessment:  1. PAD (peripheral artery disease) ([address]    2. Arm weakness       Arm circulation seems fine. Doubt that this is statin related, but gave her option of 2 wk trial off statin to see. Bonnie in 6 mo then rtc  30 min chart review and pt encounter  Plan of care: Follow up and evaluate.        Electronically signed by All Gutierrez MD on 2/22/23 at 1:31 PM EST

## 2023-04-03 ENCOUNTER — HOSPITAL ENCOUNTER (OUTPATIENT)
Dept: CT IMAGING | Age: 63
Discharge: HOME | End: 2023-04-05
Payer: COMMERCIAL

## 2023-04-03 ENCOUNTER — HOSPITAL ENCOUNTER (OUTPATIENT)
Age: 63
Discharge: HOME | End: 2023-04-03
Payer: COMMERCIAL

## 2023-04-03 DIAGNOSIS — Z98.890 HISTORY OF LEFT COMMON CAROTID ARTERY STENT PLACEMENT: Primary | ICD-10-CM

## 2023-04-03 DIAGNOSIS — I72.0 PSEUDOANEURYSM OF CAROTID ARTERY (HCC): ICD-10-CM

## 2023-04-03 DIAGNOSIS — Z95.828 HISTORY OF LEFT COMMON CAROTID ARTERY STENT PLACEMENT: Primary | ICD-10-CM

## 2023-04-03 DIAGNOSIS — Z98.890 HISTORY OF LEFT COMMON CAROTID ARTERY STENT PLACEMENT: ICD-10-CM

## 2023-04-03 DIAGNOSIS — Z95.828 HISTORY OF LEFT COMMON CAROTID ARTERY STENT PLACEMENT: ICD-10-CM

## 2023-04-03 LAB
BUN SERPL-MCNC: 18 MG/DL (ref 8–23)
CREAT SERPL-MCNC: 0.8 MG/DL (ref 0.5–0.9)
GFR SERPL CREATININE-BSD FRML MDRD: >60 ML/MIN/1.73M2

## 2023-04-03 PROCEDURE — 36415 COLL VENOUS BLD VENIPUNCTURE: CPT

## 2023-04-03 PROCEDURE — 82565 ASSAY OF CREATININE: CPT

## 2023-04-03 PROCEDURE — 70498 CT ANGIOGRAPHY NECK: CPT

## 2023-04-03 PROCEDURE — 6360000004 HC RX CONTRAST MEDICATION: Performed by: PSYCHIATRY & NEUROLOGY

## 2023-04-03 PROCEDURE — 84520 ASSAY OF UREA NITROGEN: CPT

## 2023-04-03 RX ADMIN — IOPAMIDOL 100 ML: 755 INJECTION, SOLUTION INTRAVENOUS at 14:49

## 2023-04-27 RX ORDER — ROSUVASTATIN CALCIUM 40 MG/1
40 TABLET, COATED ORAL DAILY
Qty: 30 TABLET | Refills: 0 | Status: SHIPPED | OUTPATIENT
Start: 2023-04-27

## 2023-04-27 RX ORDER — PANTOPRAZOLE SODIUM 40 MG/1
40 TABLET, DELAYED RELEASE ORAL DAILY
Qty: 30 TABLET | Refills: 0 | Status: SHIPPED | OUTPATIENT
Start: 2023-04-27

## 2023-04-27 RX ORDER — LOSARTAN POTASSIUM 50 MG/1
50 TABLET ORAL DAILY
Qty: 30 TABLET | Refills: 0 | Status: SHIPPED | OUTPATIENT
Start: 2023-04-27

## 2023-04-27 NOTE — TELEPHONE ENCOUNTER
30 days to DM, changed insurance    Last visit:  10/4/2022  Next Visit Date:    Future Appointments   Date Time Provider Zainab Kim   5/10/2023  6:20 AM Farias Other, DO [facility]   8/23/2023  1:45 PM Xiomara Hollingsworth MD [facility]         Medication List:  Prior to Admission medications    Medication Sig Start Date End Date Taking? Authorizing Provider   amLODIPine (NORVASC) 10 MG tablet take 1 tablet by mouth once daily 4/13/23   Radha Hatfield MD   fluticasone [facility]) 50 MCG/ACT nasal spray 2 sprays by Nasal route daily 10/24/22   Farias Other, DO   fluocinolone (DERMOTIC) 0.01 % OIL oil Put 4 drops into the affected ear(s) twice daily x 2 weeks. After that can decrease use to 1-2 times a week for maintenance. 4/29/22   Farias Other, DO   pantoprazole (PROTONIX) 40 MG tablet Take 1 tablet by mouth daily 4/26/22   Farias Other, DO   aspirin EC 81 MG EC tablet Take 1 tablet by mouth daily 4/26/22   Farias Other, DO   losartan (COZAAR) 50 MG tablet Take 1 tablet by mouth daily 4/26/22   Farias Other, DO   rosuvastatin (CRESTOR) 40 MG tablet Take 1 tablet by mouth daily 4/26/22   Farias Other, DO   ciclopirox (PENLAC) 8 % solution Apply topically nightly.  4/26/22   Farias Other, DO   triamcinolone (KENALOG) 0.1 % ointment Apply topically 2 times daily 7/29/21   Farias Other, DO   Probiotic Product (PROBIOTIC-10) CAPS Take by mouth daily    Historical Provider, MD   Multiple Vitamin (MULTI-VITAMIN DAILY PO) Take by mouth    Historical Provider, MD   Ascorbic Acid (VITAMIN C) 250 MG tablet Take 250 mg by mouth daily    Historical Provider, MD   Cholecalciferol (VITAMIN D3) 2000 units CAPS Take by mouth    Historical Provider, MD   acetaminophen (TYLENOL) 325 MG tablet Take 325 mg by mouth every 6 hours as needed for Pain    Historical Provider, MD

## 2023-05-03 ENCOUNTER — HOSPITAL ENCOUNTER (EMERGENCY)
Age: 63
Discharge: ANOTHER HOSPITAL | End: 2023-05-04
Attending: FAMILY MEDICINE
Payer: COMMERCIAL

## 2023-05-03 VITALS
HEIGHT: 66 IN | SYSTOLIC BLOOD PRESSURE: 134 MMHG | TEMPERATURE: 100.9 F | BODY MASS INDEX: 17.36 KG/M2 | HEART RATE: 101 BPM | DIASTOLIC BLOOD PRESSURE: 64 MMHG | RESPIRATION RATE: 16 BRPM | OXYGEN SATURATION: 98 % | WEIGHT: 108 LBS

## 2023-05-03 DIAGNOSIS — D64.89 ANEMIA DUE TO OTHER CAUSE, NOT CLASSIFIED: ICD-10-CM

## 2023-05-03 DIAGNOSIS — R19.5 POSITIVE FECAL OCCULT BLOOD TEST: ICD-10-CM

## 2023-05-03 DIAGNOSIS — R11.2 NAUSEA VOMITING AND DIARRHEA: ICD-10-CM

## 2023-05-03 DIAGNOSIS — K92.2 GASTROINTESTINAL HEMORRHAGE, UNSPECIFIED GASTROINTESTINAL HEMORRHAGE TYPE: Primary | ICD-10-CM

## 2023-05-03 DIAGNOSIS — R19.7 NAUSEA VOMITING AND DIARRHEA: ICD-10-CM

## 2023-05-03 LAB
-: NORMAL
ABO/RH: NORMAL
ABSOLUTE EOS #: 0.1 K/UL (ref 0–0.4)
ABSOLUTE LYMPH #: 4.9 K/UL (ref 1–4.8)
ABSOLUTE MONO #: 1.2 K/UL (ref 0–1)
ALBUMIN SERPL-MCNC: 3.8 G/DL (ref 3.5–5.2)
ALP SERPL-CCNC: 68 U/L (ref 35–104)
ALT SERPL-CCNC: 19 U/L (ref 5–33)
ANION GAP SERPL CALCULATED.3IONS-SCNC: 16 MMOL/L (ref 9–17)
ANTIBODY SCREEN: NEGATIVE
AST SERPL-CCNC: 21 U/L
BASOPHILS # BLD: 0 % (ref 0–2)
BASOPHILS ABSOLUTE: 0 K/UL (ref 0–0.2)
BILIRUB SERPL-MCNC: 0.4 MG/DL (ref 0.3–1.2)
BILIRUBIN URINE: NEGATIVE
BLD PROD TYP BPU: NORMAL
BLD PROD TYP BPU: NORMAL
BLOOD BANK BLOOD PRODUCT EXPIRATION DATE: NORMAL
BLOOD BANK ISBT PRODUCT BLOOD TYPE: 5100
BLOOD BANK PRODUCT CODE: NORMAL
BLOOD BANK UNIT TYPE AND RH: NORMAL
BPU ID: NORMAL
BPU ID: NORMAL
BUN SERPL-MCNC: 27 MG/DL (ref 8–23)
BUN/CREAT BLD: 25 (ref 9–20)
CALCIUM SERPL-MCNC: 9.4 MG/DL (ref 8.6–10.4)
CHLORIDE SERPL-SCNC: 101 MMOL/L (ref 98–107)
CHP ED QC CHECK: NORMAL
CO2 SERPL-SCNC: 17 MMOL/L (ref 20–31)
COLOR: YELLOW
COMMENT UA: ABNORMAL
CREAT SERPL-MCNC: 1.06 MG/DL (ref 0.5–0.9)
CROSSMATCH RESULT: NORMAL
CROSSMATCH RESULT: NORMAL
DIFFERENTIAL TYPE: YES
DISPENSE STATUS BLOOD BANK: NORMAL
DISPENSE STATUS BLOOD BANK: NORMAL
EOSINOPHILS RELATIVE PERCENT: 1 % (ref 0–5)
EPITHELIAL CELLS UA: NORMAL /HPF
EXPIRATION DATE: NORMAL
GFR SERPL CREATININE-BSD FRML MDRD: 59 ML/MIN/1.73M2
GLUCOSE SERPL-MCNC: 192 MG/DL (ref 70–99)
GLUCOSE UR STRIP.AUTO-MCNC: NEGATIVE MG/DL
HCT VFR BLD AUTO: 19.4 % (ref 36–46)
HCT VFR BLD AUTO: 25.6 % (ref 36–46)
HEMOCCULT STL QL: POSITIVE
HGB BLD-MCNC: 6.6 G/DL (ref 12–16)
HGB BLD-MCNC: 8.8 G/DL (ref 12–16)
KETONES UR STRIP.AUTO-MCNC: NEGATIVE MG/DL
LEUKOCYTE ESTERASE UR QL STRIP.AUTO: ABNORMAL
LIPASE SERPL-CCNC: 62 U/L (ref 13–60)
LYMPHOCYTES # BLD: 35 % (ref 15–40)
MCH RBC QN AUTO: 31.4 PG (ref 26–34)
MCHC RBC AUTO-ENTMCNC: 34.1 G/DL (ref 31–37)
MCV RBC AUTO: 91.8 FL (ref 80–100)
MONOCYTES # BLD: 8 % (ref 4–8)
NITRITE UR QL STRIP.AUTO: NEGATIVE
PDW BLD-RTO: 13.6 % (ref 12.1–15.2)
PLATELET # BLD AUTO: 361 K/UL (ref 140–450)
POTASSIUM SERPL-SCNC: 4.2 MMOL/L (ref 3.7–5.3)
PROT SERPL-MCNC: 6.3 G/DL (ref 6.4–8.3)
PROT UR STRIP.AUTO-MCNC: 6 MG/DL (ref 5–8)
PROT UR STRIP.AUTO-MCNC: ABNORMAL MG/DL
RBC # BLD: 2.79 M/UL (ref 4–5.2)
RBC CLUMPS #/AREA URNS AUTO: NORMAL /HPF (ref 0–2)
SEG NEUTROPHILS: 56 % (ref 47–75)
SEGMENTED NEUTROPHILS ABSOLUTE COUNT: 7.6 K/UL (ref 2.5–7)
SODIUM SERPL-SCNC: 134 MMOL/L (ref 135–144)
SPECIFIC GRAVITY UA: 1.01 (ref 1–1.03)
TRANSFUSION STATUS: NORMAL
TRANSFUSION STATUS: NORMAL
TROPONIN I SERPL DL<=0.01 NG/ML-MCNC: 9 NG/L (ref 0–14)
TURBIDITY: CLEAR
UNIT DIVISION: 0
UNIT DIVISION: 0
UNIT ISSUE DATE/TIME: NORMAL
URINE HGB: ABNORMAL
UROBILINOGEN, URINE: NORMAL
WBC # BLD AUTO: 13.8 K/UL (ref 3.5–11)
WBC UA: NORMAL /HPF

## 2023-05-03 PROCEDURE — 86901 BLOOD TYPING SEROLOGIC RH(D): CPT

## 2023-05-03 PROCEDURE — 87077 CULTURE AEROBIC IDENTIFY: CPT

## 2023-05-03 PROCEDURE — 85025 COMPLETE CBC W/AUTO DIFF WBC: CPT

## 2023-05-03 PROCEDURE — A4216 STERILE WATER/SALINE, 10 ML: HCPCS | Performed by: FAMILY MEDICINE

## 2023-05-03 PROCEDURE — 36415 COLL VENOUS BLD VENIPUNCTURE: CPT

## 2023-05-03 PROCEDURE — 6360000002 HC RX W HCPCS: Performed by: FAMILY MEDICINE

## 2023-05-03 PROCEDURE — 36430 TRANSFUSION BLD/BLD COMPNT: CPT

## 2023-05-03 PROCEDURE — 86900 BLOOD TYPING SEROLOGIC ABO: CPT

## 2023-05-03 PROCEDURE — 80053 COMPREHEN METABOLIC PANEL: CPT

## 2023-05-03 PROCEDURE — 6370000000 HC RX 637 (ALT 250 FOR IP): Performed by: FAMILY MEDICINE

## 2023-05-03 PROCEDURE — 93005 ELECTROCARDIOGRAM TRACING: CPT | Performed by: FAMILY MEDICINE

## 2023-05-03 PROCEDURE — 85018 HEMOGLOBIN: CPT

## 2023-05-03 PROCEDURE — 87086 URINE CULTURE/COLONY COUNT: CPT

## 2023-05-03 PROCEDURE — 96375 TX/PRO/DX INJ NEW DRUG ADDON: CPT

## 2023-05-03 PROCEDURE — C9113 INJ PANTOPRAZOLE SODIUM, VIA: HCPCS | Performed by: FAMILY MEDICINE

## 2023-05-03 PROCEDURE — 87186 SC STD MICRODIL/AGAR DIL: CPT

## 2023-05-03 PROCEDURE — 85014 HEMATOCRIT: CPT

## 2023-05-03 PROCEDURE — 81001 URINALYSIS AUTO W/SCOPE: CPT

## 2023-05-03 PROCEDURE — 99285 EMERGENCY DEPT VISIT HI MDM: CPT

## 2023-05-03 PROCEDURE — 96374 THER/PROPH/DIAG INJ IV PUSH: CPT

## 2023-05-03 PROCEDURE — 86850 RBC ANTIBODY SCREEN: CPT

## 2023-05-03 PROCEDURE — 84484 ASSAY OF TROPONIN QUANT: CPT

## 2023-05-03 PROCEDURE — 2580000003 HC RX 258: Performed by: FAMILY MEDICINE

## 2023-05-03 PROCEDURE — 83690 ASSAY OF LIPASE: CPT

## 2023-05-03 PROCEDURE — P9016 RBC LEUKOCYTES REDUCED: HCPCS

## 2023-05-03 RX ORDER — SODIUM CHLORIDE 9 MG/ML
INJECTION, SOLUTION INTRAVENOUS PRN
Status: DISCONTINUED | OUTPATIENT
Start: 2023-05-03 | End: 2023-05-04 | Stop reason: HOSPADM

## 2023-05-03 RX ORDER — ONDANSETRON 2 MG/ML
4 INJECTION INTRAMUSCULAR; INTRAVENOUS ONCE
Status: COMPLETED | OUTPATIENT
Start: 2023-05-03 | End: 2023-05-03

## 2023-05-03 RX ORDER — 0.9 % SODIUM CHLORIDE 0.9 %
1000 INTRAVENOUS SOLUTION INTRAVENOUS ONCE
Status: COMPLETED | OUTPATIENT
Start: 2023-05-03 | End: 2023-05-04

## 2023-05-03 RX ORDER — ACETAMINOPHEN 500 MG
1000 TABLET ORAL ONCE
Status: COMPLETED | OUTPATIENT
Start: 2023-05-03 | End: 2023-05-03

## 2023-05-03 RX ADMIN — SODIUM CHLORIDE 40 MG: 9 INJECTION INTRAMUSCULAR; INTRAVENOUS; SUBCUTANEOUS at 15:36

## 2023-05-03 RX ADMIN — CEFTRIAXONE SODIUM 1000 MG: 1 INJECTION, POWDER, FOR SOLUTION INTRAMUSCULAR; INTRAVENOUS at 23:46

## 2023-05-03 RX ADMIN — SODIUM CHLORIDE 1000 ML: 9 INJECTION, SOLUTION INTRAVENOUS at 13:23

## 2023-05-03 RX ADMIN — ACETAMINOPHEN 1000 MG: 500 TABLET, FILM COATED ORAL at 23:41

## 2023-05-03 RX ADMIN — ONDANSETRON 4 MG: 2 INJECTION INTRAMUSCULAR; INTRAVENOUS at 13:23

## 2023-05-03 RX ADMIN — SODIUM CHLORIDE 8 MG/HR: 9 INJECTION, SOLUTION INTRAVENOUS at 15:48

## 2023-05-03 ASSESSMENT — LIFESTYLE VARIABLES
HOW OFTEN DO YOU HAVE A DRINK CONTAINING ALCOHOL: NEVER
HOW MANY STANDARD DRINKS CONTAINING ALCOHOL DO YOU HAVE ON A TYPICAL DAY: PATIENT DOES NOT DRINK

## 2023-05-03 ASSESSMENT — ENCOUNTER SYMPTOMS
DIARRHEA: 1
NAUSEA: 1
VOMITING: 1
BLOOD IN STOOL: 1

## 2023-05-03 ASSESSMENT — PAIN - FUNCTIONAL ASSESSMENT: PAIN_FUNCTIONAL_ASSESSMENT: FACE, LEGS, ACTIVITY, CRY, AND CONSOLABILITY (FLACC)

## 2023-05-03 NOTE — ED NOTES
The pt was incontinent of stool; states she has had diarrhea for the past couple of days and it has been dark black stools. The pt also states she had a coffee ground emesis a few hours before arriving.      Kathee Canavan, RN  05/03/23 [id number]

## 2023-05-03 NOTE — ED PROVIDER NOTES
or difficulty breathing, was given Tylenol 1000 mg p.o. x1, will continue with blood at reduced rate. On arrival of MICU transport crew, report given by nursing and physician, patient packaged for transport    Ceftriaxone 1000 mg IV x1 ordered        1)  Number and Complexity of Problems  Problem List This Visit: Nausea vomiting diarrhea abdominal pain    Differential Diagnosis: GI bleed, anemia NOS, pancreatitis    Diagnoses Considered but Do Not Suspect: N/A    Pertinent Comorbid Conditions: N/A    2)  Data Reviewed  My EKG interpretation:  As above    Decision Rules/Scores utilized: N/A    Tests considered but not ordered and why: N/A    External Documents Reviewed: N/A    Imaging that is independently reviewed and interpreted by me as: N/A    See more data below for the lab and radiology tests and orders. 3)  Treatment and Disposition    Patient repeat assessment:  As above    Disposition discussion with patient/family: Transfer    Case discussed with consulting clinician:  As above    Social determinants of health impacting treatment or disposition: N/A    Shared Decision Making: As above    Code Status Discussion: N/A      REASSESSMENT     As above      CRITICAL CARE TIME     Total Critical Care time was 45 minutes, excluding separately reportable procedures. There was a high probability of clinically significant/life threatening deterioration in the patient's condition which required my urgent intervention. PROCEDURES:  Unless otherwise noted below, none     Procedures    FINAL IMPRESSION      1. Gastrointestinal hemorrhage, unspecified gastrointestinal hemorrhage type    2. Nausea vomiting and diarrhea    3. Positive fecal occult blood test    4. Anemia due to other cause, not classified          DISPOSITION/PLAN     DISPOSITION Decision To Transfer 05/03/2023 01:42:34 PM      PATIENT REFERRED TO:  No follow-up provider specified.     DISCHARGE MEDICATIONS:  New Prescriptions    No medications on

## 2023-05-04 PROCEDURE — 99285 EMERGENCY DEPT VISIT HI MDM: CPT

## 2023-05-04 NOTE — CONSENT
Informed Consent for Blood Component Transfusion Note    I have discussed with the patient the rationale for blood component transfusion; its benefits in treating or preventing fatigue, organ damage, or death; and its risk which includes mild transfusion reactions, rare risk of blood borne infection, or more serious but rare reactions. I have discussed the alternatives to transfusion, including the risk and consequences of not receiving transfusion. The patient had an opportunity to ask questions and had agreed to proceed with transfusion of blood components.     Electronically signed by Jazmín Spann MD on 5/3/23 at 8:00 PM EDT

## 2023-05-05 LAB
EKG ATRIAL RATE: 115 BPM
EKG P AXIS: 75 DEGREES
EKG P-R INTERVAL: 142 MS
EKG Q-T INTERVAL: 338 MS
EKG QRS DURATION: 78 MS
EKG QTC CALCULATION (BAZETT): 467 MS
EKG R AXIS: 81 DEGREES
EKG T AXIS: 78 DEGREES
EKG VENTRICULAR RATE: 115 BPM
MICROORGANISM SPEC CULT: ABNORMAL
SPECIMEN DESCRIPTION: ABNORMAL

## 2023-05-05 PROCEDURE — 93010 ELECTROCARDIOGRAM REPORT: CPT | Performed by: INTERNAL MEDICINE

## 2023-05-08 ENCOUNTER — TELEPHONE (OUTPATIENT)
Dept: FAMILY MEDICINE CLINIC | Age: 63
End: 2023-05-08

## 2023-05-08 NOTE — TELEPHONE ENCOUNTER
Ac Stokes is being discharged from [place].  Follow up scheduled for 5/10    Health Maintenance   Topic Date Due    HIV screen  Never done    DTaP/Tdap/Td vaccine (1 - Tdap) Never done    Cervical cancer screen  Never done    Colorectal Cancer Screen  Never done    Breast cancer screen  01/24/2022    Lipids  02/24/2023    Depression Screen  08/30/2023    GFR test (Diabetes, CKD 3-4, OR last GFR 15-59)  05/03/2024    Flu vaccine  Completed    Shingles vaccine  Completed    COVID-19 Vaccine  Completed    Hepatitis C screen  Completed    Hepatitis A vaccine  Aged Out    Hib vaccine  Aged Out    Meningococcal (ACWY) vaccine  Aged Out    Pneumococcal 0-64 years Vaccine  Aged Out             (applicable per patient's age: Cancer Screenings, Depression Screening, Fall Risk Screening, Immunizations)    Hemoglobin A1C (%)   Date Value   04/20/2021 5.6   01/03/2020 5.3     LDL Cholesterol (mg/dL)   Date Value   02/24/2022 56     AST (U/L)   Date Value   05/03/2023 21     ALT (U/L)   Date Value   05/03/2023 19     BUN (mg/dL)   Date Value   05/03/2023 27 (H)      (goal A1C is < 7)   (goal LDL is <100) need 30-50% reduction from baseline     BP Readings from Last 3 Encounters:   05/03/23 134/64   02/22/23 121/66   10/04/22 128/60    (goal /80)      All Future Testing planned in [facility]:  Lab Frequency Next Occurrence   VL CHERYLE BILATERAL LIMITED 1-2 LEVELS Once 02/22/2023       Next Visit Date:  Future Appointments   Date Time Provider Zainab Kim   5/10/2023  6:00 PM DO [facility]   8/23/2023  1:45 PM [facility]            Patient Active Problem List:     Chronic eczematous otitis externa of both ears     Essential hypertension     Infarction of right basal ganglia (HCC)     Pseudoaneurysm of carotid artery ([address]     Peripheral vascular disease ([address]     Atherosclerosis of native arteries of extremities with intermittent claudication, bilateral legs ([address]

## 2023-05-09 NOTE — TELEPHONE ENCOUNTER
Care Transitions Initial Follow Up Call    Outreach made within 2 business days of discharge: Yes    Patient: Chang Catalan Patient : 1960   MRN: [id number]  Reason for Admission: gastric ulcer, anemia, gi bleed  Discharge Date: 23       Spoke with: call not required, patient following up within 48 hours of d/c    Discharge department/facility: [facility]         Scheduled appointment with PCP within 7-14 days    Follow Up  Future Appointments   Date Time Provider Zainab Kim   5/10/2023  6:00 AM DO Gabby [facility]   2023  1:45 PM Gale Ahmadi MD [facility]       Edelmira Méndez, [address] [place] [address] Frances Le

## 2023-05-10 ENCOUNTER — TELEPHONE (OUTPATIENT)
Dept: FAMILY MEDICINE CLINIC | Age: 63
End: 2023-05-10

## 2023-05-10 SDOH — ECONOMIC STABILITY: FOOD INSECURITY: WITHIN THE PAST 12 MONTHS, THE FOOD YOU BOUGHT JUST DIDN'T LAST AND YOU DIDN'T HAVE MONEY TO GET MORE.: NEVER TRUE

## 2023-05-10 SDOH — ECONOMIC STABILITY: INCOME INSECURITY: HOW HARD IS IT FOR YOU TO PAY FOR THE VERY BASICS LIKE FOOD, HOUSING, MEDICAL CARE, AND HEATING?: NOT VERY HARD

## 2023-05-10 SDOH — ECONOMIC STABILITY: FOOD INSECURITY: WITHIN THE PAST 12 MONTHS, YOU WORRIED THAT YOUR FOOD WOULD RUN OUT BEFORE YOU GOT MONEY TO BUY MORE.: NEVER TRUE

## 2023-05-10 SDOH — ECONOMIC STABILITY: TRANSPORTATION INSECURITY
IN THE PAST 12 MONTHS, HAS LACK OF TRANSPORTATION KEPT YOU FROM MEETINGS, WORK, OR FROM GETTING THINGS NEEDED FOR DAILY LIVING?: NO

## 2023-05-10 SDOH — ECONOMIC STABILITY: HOUSING INSECURITY
IN THE LAST 12 MONTHS, WAS THERE A TIME WHEN YOU DID NOT HAVE A STEADY PLACE TO SLEEP OR SLEPT IN A SHELTER (INCLUDING NOW)?: NO

## 2023-05-10 NOTE — TELEPHONE ENCOUNTER
Patient rescheduled appt. Care Transitions Initial Follow Up Call    Outreach made within 2 business days of discharge: Yes    Patient: López Lee Patient : 1960   MRN: [id number]  Reason for Admission: GI bleed, anemia  Discharge Date: 23     Spoke with: Petr Guzman     Discharge department/facility: [facility] Patient Contact:  Was patient able to fill all prescriptions: Yes  Was patient instructed to bring all medications to the follow-up visit: Yes  Is patient taking all medications as directed in the discharge summary?  Yes  Does patient understand their discharge instructions: Yes  Does patient have questions or concerns that need addressed prior to 7-14 day follow up office visit: no    Scheduled appointment with PCP within 7-14 days    Follow Up  Future Appointments   Date Time Provider Zainab Kim   2023  2:20 PM Anitha Cannon DO [place] [address]   2023  1:45 PM Guera Ghosh MD [place], [place]

## 2023-05-12 ENCOUNTER — OFFICE VISIT (OUTPATIENT)
Dept: FAMILY MEDICINE CLINIC | Age: 63
End: 2023-05-12

## 2023-05-12 ENCOUNTER — HOSPITAL ENCOUNTER (OUTPATIENT)
Age: 63
Discharge: HOME | End: 2023-05-12
Payer: COMMERCIAL

## 2023-05-12 VITALS
BODY MASS INDEX: 17.19 KG/M2 | DIASTOLIC BLOOD PRESSURE: 50 MMHG | OXYGEN SATURATION: 96 % | HEART RATE: 92 BPM | WEIGHT: 107 LBS | SYSTOLIC BLOOD PRESSURE: 122 MMHG | HEIGHT: 66 IN

## 2023-05-12 DIAGNOSIS — D62 ACUTE BLOOD LOSS ANEMIA: ICD-10-CM

## 2023-05-12 DIAGNOSIS — I10 ESSENTIAL HYPERTENSION: ICD-10-CM

## 2023-05-12 DIAGNOSIS — R20.2 PARESTHESIA: ICD-10-CM

## 2023-05-12 DIAGNOSIS — G56.03 BILATERAL CARPAL TUNNEL SYNDROME: ICD-10-CM

## 2023-05-12 DIAGNOSIS — K25.0 BLEEDING ACUTE GASTRIC ULCER: Primary | ICD-10-CM

## 2023-05-12 DIAGNOSIS — Z09 HOSPITAL DISCHARGE FOLLOW-UP: ICD-10-CM

## 2023-05-12 LAB
ABSOLUTE EOS #: 0.3 K/UL (ref 0–0.4)
ABSOLUTE LYMPH #: 2.1 K/UL (ref 1–4.8)
ABSOLUTE MONO #: 0.7 K/UL (ref 0–1)
BASOPHILS # BLD: 0 % (ref 0–2)
BASOPHILS ABSOLUTE: 0 K/UL (ref 0–0.2)
DIFFERENTIAL TYPE: YES
EOSINOPHILS RELATIVE PERCENT: 5 % (ref 0–5)
FOLATE SERPL-MCNC: 16.9 NG/ML
HCT VFR BLD AUTO: 27.3 % (ref 36–46)
HGB BLD-MCNC: 9.2 G/DL (ref 12–16)
IRON SATURATION: 8 % (ref 20–55)
IRON SERPL-MCNC: 20 UG/DL (ref 37–145)
LYMPHOCYTES # BLD: 29 % (ref 15–40)
MCH RBC QN AUTO: 30.6 PG (ref 26–34)
MCHC RBC AUTO-ENTMCNC: 33.7 G/DL (ref 31–37)
MCV RBC AUTO: 90.9 FL (ref 80–100)
MONOCYTES # BLD: 10 % (ref 4–8)
PDW BLD-RTO: 14 % (ref 12.1–15.2)
PLATELET # BLD AUTO: 524 K/UL (ref 140–450)
RBC # BLD: 3.01 M/UL (ref 4–5.2)
SEG NEUTROPHILS: 56 % (ref 47–75)
SEGMENTED NEUTROPHILS ABSOLUTE COUNT: 4.1 K/UL (ref 2.5–7)
TIBC SERPL-MCNC: 255 UG/DL (ref 250–450)
TSH SERPL-ACNC: 2 UIU/ML (ref 0.3–5)
UNSATURATED IRON BINDING CAPACITY: 235 UG/DL (ref 112–347)
VIT B12 SERPL-MCNC: >2000 PG/ML (ref 232–1245)
WBC # BLD AUTO: 7.3 K/UL (ref 3.5–11)

## 2023-05-12 PROCEDURE — 82746 ASSAY OF FOLIC ACID SERUM: CPT

## 2023-05-12 PROCEDURE — 84443 ASSAY THYROID STIM HORMONE: CPT

## 2023-05-12 PROCEDURE — 36415 COLL VENOUS BLD VENIPUNCTURE: CPT

## 2023-05-12 PROCEDURE — 83550 IRON BINDING TEST: CPT

## 2023-05-12 PROCEDURE — 83540 ASSAY OF IRON: CPT

## 2023-05-12 PROCEDURE — 85025 COMPLETE CBC W/AUTO DIFF WBC: CPT

## 2023-05-12 PROCEDURE — 82607 VITAMIN B-12: CPT

## 2023-05-12 RX ORDER — LOSARTAN POTASSIUM 50 MG/1
50 TABLET ORAL DAILY
Qty: 90 TABLET | Refills: 1 | Status: SHIPPED | OUTPATIENT
Start: 2023-05-12

## 2023-05-12 RX ORDER — ROSUVASTATIN CALCIUM 40 MG/1
40 TABLET, COATED ORAL DAILY
Qty: 90 TABLET | Refills: 1 | Status: SHIPPED | OUTPATIENT
Start: 2023-05-12

## 2023-05-12 RX ORDER — LANOLIN ALCOHOL/MO/W.PET/CERES
325 CREAM (GRAM) TOPICAL DAILY
Qty: 30 TABLET | Refills: 2 | Status: SHIPPED | OUTPATIENT
Start: 2023-05-12

## 2023-05-12 ASSESSMENT — PATIENT HEALTH QUESTIONNAIRE - PHQ9
SUM OF ALL RESPONSES TO PHQ QUESTIONS 1-9: 0
2. FEELING DOWN, DEPRESSED OR HOPELESS: 0
SUM OF ALL RESPONSES TO PHQ9 QUESTIONS 1 & 2: 0
SUM OF ALL RESPONSES TO PHQ QUESTIONS 1-9: 0
1. LITTLE INTEREST OR PLEASURE IN DOING THINGS: 0

## 2023-05-12 NOTE — PROGRESS NOTES
134 05/03/2023 01:25 PM    K 4.2 05/03/2023 01:25 PM     05/03/2023 01:25 PM    CO2 17 05/03/2023 01:25 PM    BUN 27 05/03/2023 01:25 PM    CREATININE 1.06 05/03/2023 01:25 PM    GLUCOSE 192 05/03/2023 01:25 PM    PROT 6.3 05/03/2023 01:25 PM    LABALBU 3.8 05/03/2023 01:25 PM    BILITOT 0.4 05/03/2023 01:25 PM    ALKPHOS 68 05/03/2023 01:25 PM    AST 21 05/03/2023 01:25 PM    ALT 19 05/03/2023 01:25 PM     Lab Results   Component Value Date/Time    WBC 7.3 05/12/2023 03:28 PM    RBC 3.01 05/12/2023 03:28 PM    HGB 9.2 05/12/2023 03:28 PM    HCT 27.3 05/12/2023 03:28 PM    MCV 90.9 05/12/2023 03:28 PM    MCH 30.6 05/12/2023 03:28 PM    MCHC 33.7 05/12/2023 03:28 PM    RDW 14.0 05/12/2023 03:28 PM     05/12/2023 03:28 PM    MPV 8.7 07/12/2022 11:45 AM     Lab Results   Component Value Date/Time    TSH 2.00 05/12/2023 03:28 PM     Lab Results   Component Value Date/Time    CHOL 129 02/24/2022 09:16 AM    HDL 53 02/24/2022 09:16 AM    LABA1C 5.6 04/20/2021 05:00 AM         Assessment & Plan:        Diagnosis Orders   1. Bleeding acute gastric ulcer        2. Hospital discharge follow-up        3. Acute blood loss anemia  CBC with Auto Differential    Iron and TIBC      4. Paresthesia  TSH with Reflex    Vitamin B12 & Folate      5. Bilateral carpal tunnel syndrome        6. Essential hypertension          1-3. Bleeding gastric ulcer, required 3 units of PRBC, protonix continuous infusion. Was taken off aspirin for next couple mos. Continue no aspirin or nsaid's, eating before coffee, eating on regular basis, protonix 40 mg BID. Updating labs. Discharge hb 8.9.   4-5. Carlos Eduardo hand numbness which was worse with the acute anemia, but has significant thenar atrophy and has signs of CTS on exam. Try exercises and stretches, soft brace which she already has. If symptoms persist will consider ortho referral after she's feeling better. 6. HTN currently with low readings - cut amlodipine in half and monitor at home.

## 2023-05-22 LAB
ABO/RH: NORMAL
ANTIBODY SCREEN: NEGATIVE
BLD PROD TYP BPU: NORMAL
BLD PROD TYP BPU: NORMAL
BLOOD BANK BLOOD PRODUCT EXPIRATION DATE: NORMAL
BLOOD BANK ISBT PRODUCT BLOOD TYPE: 5100
BLOOD BANK PRODUCT CODE: NORMAL
BLOOD BANK UNIT TYPE AND RH: NORMAL
BPU ID: NORMAL
BPU ID: NORMAL
CROSSMATCH RESULT: NORMAL
CROSSMATCH RESULT: NORMAL
DISPENSE STATUS BLOOD BANK: NORMAL
DISPENSE STATUS BLOOD BANK: NORMAL
EXPIRATION DATE: NORMAL
TRANSFUSION STATUS: NORMAL
TRANSFUSION STATUS: NORMAL
UNIT DIVISION: 0
UNIT DIVISION: 0
UNIT ISSUE DATE/TIME: NORMAL

## 2023-05-23 ENCOUNTER — TELEPHONE (OUTPATIENT)
Dept: FAMILY MEDICINE CLINIC | Age: 63
End: 2023-05-23

## 2023-05-23 NOTE — TELEPHONE ENCOUNTER
Patient asking for Dr. Thor Kim to fill  out FMLA for work. States that her employer said that she was to return today but she was told the 29th. Oleg Cowart is faxing over work release to return the 29th. Per Dr. Thor Kim we will fill out fmla to rtw 5/29 as stated by Oleg Cowart.     Fmla start was May 1

## 2023-05-25 ENCOUNTER — OFFICE VISIT (OUTPATIENT)
Dept: FAMILY MEDICINE CLINIC | Age: 63
End: 2023-05-25
Payer: COMMERCIAL

## 2023-05-25 VITALS
OXYGEN SATURATION: 98 % | SYSTOLIC BLOOD PRESSURE: 122 MMHG | BODY MASS INDEX: 17.27 KG/M2 | DIASTOLIC BLOOD PRESSURE: 60 MMHG | WEIGHT: 107 LBS | HEART RATE: 86 BPM

## 2023-05-25 DIAGNOSIS — S39.012A LUMBAR STRAIN, INITIAL ENCOUNTER: ICD-10-CM

## 2023-05-25 DIAGNOSIS — N28.9 IMPAIRED RENAL FUNCTION: ICD-10-CM

## 2023-05-25 DIAGNOSIS — M54.50 ACUTE RIGHT-SIDED LOW BACK PAIN WITHOUT SCIATICA: Primary | ICD-10-CM

## 2023-05-25 PROCEDURE — 1036F TOBACCO NON-USER: CPT | Performed by: STUDENT IN AN ORGANIZED HEALTH CARE EDUCATION/TRAINING PROGRAM

## 2023-05-25 PROCEDURE — 3017F COLORECTAL CA SCREEN DOC REV: CPT | Performed by: STUDENT IN AN ORGANIZED HEALTH CARE EDUCATION/TRAINING PROGRAM

## 2023-05-25 PROCEDURE — 3078F DIAST BP <80 MM HG: CPT | Performed by: STUDENT IN AN ORGANIZED HEALTH CARE EDUCATION/TRAINING PROGRAM

## 2023-05-25 PROCEDURE — G8419 CALC BMI OUT NRM PARAM NOF/U: HCPCS | Performed by: STUDENT IN AN ORGANIZED HEALTH CARE EDUCATION/TRAINING PROGRAM

## 2023-05-25 PROCEDURE — G8427 DOCREV CUR MEDS BY ELIG CLIN: HCPCS | Performed by: STUDENT IN AN ORGANIZED HEALTH CARE EDUCATION/TRAINING PROGRAM

## 2023-05-25 PROCEDURE — 3074F SYST BP LT 130 MM HG: CPT | Performed by: STUDENT IN AN ORGANIZED HEALTH CARE EDUCATION/TRAINING PROGRAM

## 2023-05-25 PROCEDURE — 99214 OFFICE O/P EST MOD 30 MIN: CPT | Performed by: STUDENT IN AN ORGANIZED HEALTH CARE EDUCATION/TRAINING PROGRAM

## 2023-05-25 RX ORDER — CYCLOBENZAPRINE HCL 10 MG
10 TABLET ORAL NIGHTLY PRN
Qty: 10 TABLET | Refills: 0 | Status: SHIPPED | OUTPATIENT
Start: 2023-05-25 | End: 2023-06-04

## 2023-05-25 ASSESSMENT — ENCOUNTER SYMPTOMS
COUGH: 0
SINUS PAIN: 0
RHINORRHEA: 0
WHEEZING: 0
BACK PAIN: 1
DIARRHEA: 0
VOMITING: 0
NAUSEA: 0
ABDOMINAL PAIN: 0

## 2023-05-25 NOTE — PROGRESS NOTES
HPI Notes    Name: Fernando Carrion  : 1960         Chief Complaint:     Chief Complaint   Patient presents with    Lower Back Pain     Low back pain started today. No fever, n/v, abdominal pain or change in urination. Patient describes this as sharp pain that does not radiate to other areas and there is no numbness or tingling. History of Present Illness:        HPI    This is a 73-year-old woman presenting for evaluation of acute low back pain without sciatica. This is located on the right side, just superior to the sacral base. She denies fever, nausea, vomiting, abdominal pain, dysuria, hematuria, hematemesis, radiculopathy, paresthesias, numbness of the extremities, red flag signs for cauda equina. She reports that she was sitting on the toilet defecating and then experienced sharp pain at that location which does not radiate upon arising from a seated position. Symptoms have been waxing and waning since then. She has tried heat, hot bath with a little bit of improvement.      Past Medical History:     Past Medical History:   Diagnosis Date    Allergic rhinitis     dust mites and nickel    Bursitis of left shoulder     Cerebral artery occlusion with cerebral infarction [facility])     Cerebrovascular disease 22    Essential hypertension 2021    Dr. Michaela Trimble    History of femoral angiogram 2022    [facility]    Hyperlipidemia     Pseudoaneur[facility] of carotid artery [address] 2021    Transient ischemic attack 2021    Wears dentures     Wears glasses       Reviewed all health maintenance requirements and ordered appropriate tests  Health Maintenance Due   Topic Date Due    HIV screen  Never done    DTaP/Tdap/Td vaccine (1 - Tdap) Never done    Cervical cancer screen  Never done    Colorectal Cancer Screen  Never done    Low dose CT lung screening  Never done    Breast cancer screen  2022    Lipids  2023       Past Surgical History:     Past Surgical

## 2023-05-31 ENCOUNTER — TELEPHONE (OUTPATIENT)
Dept: FAMILY MEDICINE CLINIC | Age: 63
End: 2023-05-31

## 2023-05-31 NOTE — TELEPHONE ENCOUNTER
Patient would like to know if Dr. Amarilys Coleman could keep her off work until she see her on June 12? Patient states she is not feeling much better and is scared to go back to work. Please let patient know.     Health Maintenance   Topic Date Due    HIV screen  Never done    DTaP/Tdap/Td vaccine (1 - Tdap) Never done    Cervical cancer screen  Never done    Colorectal Cancer Screen  Never done    Low dose CT lung screening  Never done    Breast cancer screen  01/24/2022    Lipids  02/24/2023    GFR test (Diabetes, CKD 3-4, OR last GFR 15-59)  05/03/2024    Depression Screen  05/12/2024    Flu vaccine  Completed    Shingles vaccine  Completed    COVID-19 Vaccine  Completed    Hepatitis C screen  Completed    Hepatitis A vaccine  Aged Out    Hib vaccine  Aged Out    Meningococcal (ACWY) vaccine  Aged Out    Pneumococcal 0-64 years Vaccine  Aged Out             (applicable per patient's age: Cancer Screenings, Depression Screening, Fall Risk Screening, Immunizations)    Hemoglobin A1C (%)   Date Value   04/20/2021 5.6   01/03/2020 5.3     LDL Cholesterol (mg/dL)   Date Value   02/24/2022 56     AST (U/L)   Date Value   05/03/2023 21     ALT (U/L)   Date Value   05/03/2023 19     BUN (mg/dL)   Date Value   05/03/2023 27 (H)      (goal A1C is < 7)   (goal LDL is <100) need 30-50% reduction from baseline     BP Readings from Last 3 Encounters:   05/25/23 122/60   05/12/23 (!) 122/50   05/03/23 134/64    (goal /80)      All Future Testing planned in [facility]:  Lab Frequency Next Occurrence   VL CHERYLE BILATERAL LIMITED 1-2 LEVELS Once 02/22/2023       Next Visit Date:  Future Appointments   Date Time Provider Zainab Kim   6/12/2023  2:40 PM [facility]   8/23/2023  1:45 PM Sunday [facility]            Patient Active Problem List:     Chronic eczematous otitis externa of both ears     Essential hypertension     Infarction of right basal ganglia (HCC)     Pseudoaneurysm of

## 2023-06-01 NOTE — TELEPHONE ENCOUNTER
What specific symptoms? If it's still r/t the GI bleed (weakness, upset stomach, nausea, fatigue) then I would say that's ok. If it's the back pain, no - I don't want to mix those things up.

## 2023-06-01 NOTE — TELEPHONE ENCOUNTER
Patient states that she did not return to work on 5/29 as Dr. Billy Pena took her off work for back pain and was to return 6/1, but would like off for her stomach from 6/1 - 6/13. Work forms updated to return 6/13, patient states that her stomach is hurting again and doesn't feel right, advised patient to call GI and notify them of the change in sx.

## 2023-06-29 RX ORDER — PANTOPRAZOLE SODIUM 40 MG/1
40 TABLET, DELAYED RELEASE ORAL 2 TIMES DAILY
Qty: 180 TABLET | Refills: 1 | Status: SHIPPED | OUTPATIENT
Start: 2023-06-29

## 2023-08-14 RX ORDER — FLUOCINOLONE ACETONIDE 0.11 MG/ML
OIL AURICULAR (OTIC)
Qty: 20 ML | Refills: 2 | Status: SHIPPED | OUTPATIENT
Start: 2023-08-14

## 2023-08-14 NOTE — TELEPHONE ENCOUNTER
Last OV: 6/12/2023 chronic   Next scheduled apt: Visit date not found            Surescript requesting a refill

## 2023-08-15 ENCOUNTER — TRANSCRIBE ORDERS (OUTPATIENT)
Dept: ADMINISTRATIVE | Age: 63
End: 2023-08-15

## 2023-08-15 DIAGNOSIS — I73.9 PAD (PERIPHERAL ARTERY DISEASE) (HCC): Primary | ICD-10-CM

## 2023-08-21 ENCOUNTER — HOSPITAL ENCOUNTER (OUTPATIENT)
Dept: VASCULAR LAB | Age: 63
Discharge: HOME | End: 2023-08-23
Payer: COMMERCIAL

## 2023-08-21 DIAGNOSIS — I73.9 PAD (PERIPHERAL ARTERY DISEASE) (HCC): ICD-10-CM

## 2023-08-21 PROCEDURE — 93922 UPR/L XTREMITY ART 2 LEVELS: CPT

## 2023-08-23 ENCOUNTER — OFFICE VISIT (OUTPATIENT)
Dept: VASCULAR SURGERY | Age: 63
End: 2023-08-23
Payer: COMMERCIAL

## 2023-08-23 VITALS
SYSTOLIC BLOOD PRESSURE: 105 MMHG | BODY MASS INDEX: 17.4 KG/M2 | TEMPERATURE: 97.4 F | RESPIRATION RATE: 16 BRPM | WEIGHT: 108.3 LBS | HEART RATE: 96 BPM | HEIGHT: 66 IN | DIASTOLIC BLOOD PRESSURE: 63 MMHG

## 2023-08-23 DIAGNOSIS — I65.22 CAROTID ARTERY STENOSIS, ASYMPTOMATIC, LEFT: ICD-10-CM

## 2023-08-23 DIAGNOSIS — I10 PRIMARY HYPERTENSION: ICD-10-CM

## 2023-08-23 DIAGNOSIS — I73.9 PAD (PERIPHERAL ARTERY DISEASE) (HCC): Primary | ICD-10-CM

## 2023-08-23 PROCEDURE — G8427 DOCREV CUR MEDS BY ELIG CLIN: HCPCS | Performed by: INTERNAL MEDICINE

## 2023-08-23 PROCEDURE — 99215 OFFICE O/P EST HI 40 MIN: CPT | Performed by: INTERNAL MEDICINE

## 2023-08-23 PROCEDURE — 3074F SYST BP LT 130 MM HG: CPT | Performed by: INTERNAL MEDICINE

## 2023-08-23 PROCEDURE — 3078F DIAST BP <80 MM HG: CPT | Performed by: INTERNAL MEDICINE

## 2023-08-23 PROCEDURE — 3017F COLORECTAL CA SCREEN DOC REV: CPT | Performed by: INTERNAL MEDICINE

## 2023-08-23 PROCEDURE — 1036F TOBACCO NON-USER: CPT | Performed by: INTERNAL MEDICINE

## 2023-08-23 PROCEDURE — G8419 CALC BMI OUT NRM PARAM NOF/U: HCPCS | Performed by: INTERNAL MEDICINE

## 2023-08-23 RX ORDER — CLOPIDOGREL BISULFATE 75 MG/1
75 TABLET ORAL DAILY
Qty: 90 TABLET | Refills: 3 | Status: SHIPPED | OUTPATIENT
Start: 2023-08-23 | End: 2024-08-17

## 2023-08-23 NOTE — PROGRESS NOTES
Estrella Clara was seen on 8/23/2023 for   Chief Complaint   Patient presents with    Leg Pain     Patient here for follow up PAD. Patient reports some soreness in left calf. Denies swelling. Arm weakness is better. Completed imaging yesterday. Enmanuel Crews                             REVIEW OF SYSTEMS    Constitutional Weight: present, A, Fatigue: absent Fever: absent   HEENT Ears: normal,Visual disturbance: absent Sore throat: absent,    Respiratory Shortness of breath: absent, Cough: absent;, Snoring: absent   Cardivascular Chest pain: absent,  Leg pain: present,Leg swelling:absent, Non-healing wound:absent    GI Diarrhea: absent, Abdominal Pain: absent    Urinary frequency: absent, Urinary incontinence: absent   Musculoskeletal Neck pain: absent, Back pain: absent, Restless Leg:absent     Dermatological Hair loss: absent, Skin changes: absent   Neurological  Sudden Loss of Vision in one eye:absent, Slurred Speech:absent, Weakness on one side of body: absent,Headache: absent   Psychiatric Anxiety: absent, Depression: absent   Hematologic Abnormal bleeding: absent,

## 2023-08-23 NOTE — PATIENT INSTRUCTIONS
SURVEY:    You may be receiving a survey from [employer] regarding your visit today. Please complete the survey to enable us to provide the highest quality of care to you and your family. If you cannot score us a very good on any question, please call the office to discuss how we could have made your experience a very good one. Thank you.

## 2023-08-23 NOTE — PROGRESS NOTES
Madison Gee was seen on 8/23/2023 for   Chief Complaint   Patient presents with    Leg Pain     Patient here for follow up PAD. Patient reports some soreness in left calf. Denies swelling. Arm weakness is better. Completed imaging yesterday. .  7/6/22 Pt was evaluated by Dr Ian Wallis today for claudication (see note) after being referred by Dr Gita Sexton. She has lifestyle limiting claudication right leg with an luz of 0.72. It inhibits her ability even to walk from her car to work. She has had heart racing on cilostazol, forcing her to decrease dose. Not helping with leg pain. Has headache as well. UPDATE 8/17/22 On 8/2/22 she had RCIA Visipro, REIA Visipro prox, and ZPTX distal. Right leg cramping is gone. She gets mild left leg pain with walking. Angio showed only  a mild narrowing in LCFA (right beside access sheath, requiring manual pull). Left groin healed well. Addendum. Her hx includes a carotid stent by Jossy Padilla at [address] V. 10mm X31 WALLSTENT. 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 Followed by him. UPDATE 2/22/23 Arms feel weak, can't seem to gain strength. Don't cramp. Numbness in hands. Right leg feels fine. Left calf has mild discomfort with walking, but no activity limitation. Angio was remarkable only for mild cfa narrowing on left. Has been on crestor 2 yrs. UPDATE 8/23/23 Was doing ok until 5/3/23 when she had severe GI bleed. Hgb drop to 6.6. [facility] transfer. EGD found 4 ulcers. Clipped. No bleeding since. Just got ok to go back on asa if she stays on protonix. Last CT to check carotid stent looked ok. . 4/4/23. No f/u scheduled. Left calf hurts. LUZ 8/21/23 R .95  l  0.99. Back at work. Social History     Socioeconomic History    Marital status:       Spouse name: Not on file    Number of children: Not on file    Years of education: Not on file    Highest education level: Not on file   Occupational History    Not on file   Tobacco Use    Smoking

## 2023-12-04 RX ORDER — LOSARTAN POTASSIUM 50 MG/1
50 TABLET ORAL DAILY
Qty: 90 TABLET | Refills: 1 | Status: SHIPPED | OUTPATIENT
Start: 2023-12-04

## 2023-12-04 RX ORDER — AMLODIPINE BESYLATE 5 MG/1
5 TABLET ORAL DAILY
Qty: 90 TABLET | Refills: 1 | Status: SHIPPED | OUTPATIENT
Start: 2023-12-04

## 2023-12-04 RX ORDER — ROSUVASTATIN CALCIUM 40 MG/1
40 TABLET, COATED ORAL DAILY
Qty: 90 TABLET | Refills: 1 | Status: SHIPPED | OUTPATIENT
Start: 2023-12-04

## 2023-12-04 NOTE — TELEPHONE ENCOUNTER
Patient needs new scripts sent to [facility] for Rosuvastatin, Amlodipine, & Losartan
325 MG tablet Take 1 tablet by mouth every 6 hours as needed for Pain    Provider, MD Cullen

## 2023-12-11 ENCOUNTER — HOSPITAL ENCOUNTER (OUTPATIENT)
Age: 63
Discharge: HOME | End: 2023-12-11
Payer: COMMERCIAL

## 2023-12-11 ENCOUNTER — OFFICE VISIT (OUTPATIENT)
Dept: FAMILY MEDICINE CLINIC | Age: 63
End: 2023-12-11
Payer: COMMERCIAL

## 2023-12-11 VITALS
DIASTOLIC BLOOD PRESSURE: 70 MMHG | BODY MASS INDEX: 18 KG/M2 | HEIGHT: 66 IN | OXYGEN SATURATION: 98 % | SYSTOLIC BLOOD PRESSURE: 136 MMHG | WEIGHT: 112 LBS | HEART RATE: 98 BPM

## 2023-12-11 DIAGNOSIS — Z87.19 HISTORY OF GI BLEED: ICD-10-CM

## 2023-12-11 DIAGNOSIS — R82.90 FOUL SMELLING URINE: ICD-10-CM

## 2023-12-11 DIAGNOSIS — K13.6 IRRITATION OF ORAL CAVITY: ICD-10-CM

## 2023-12-11 DIAGNOSIS — R05.8 POST-VIRAL COUGH SYNDROME: ICD-10-CM

## 2023-12-11 DIAGNOSIS — I73.9 PERIPHERAL VASCULAR DISEASE (HCC): Primary | ICD-10-CM

## 2023-12-11 DIAGNOSIS — H60.331 ACUTE SWIMMER'S EAR OF RIGHT SIDE: ICD-10-CM

## 2023-12-11 DIAGNOSIS — I73.9 PERIPHERAL VASCULAR DISEASE (HCC): ICD-10-CM

## 2023-12-11 DIAGNOSIS — Z12.31 VISIT FOR SCREENING MAMMOGRAM: ICD-10-CM

## 2023-12-11 LAB
-: ABNORMAL
ALBUMIN SERPL-MCNC: 4.1 G/DL (ref 3.5–5.2)
ALP SERPL-CCNC: 126 U/L (ref 35–104)
ALT SERPL-CCNC: 16 U/L (ref 5–33)
ANION GAP SERPL CALCULATED.3IONS-SCNC: 11 MMOL/L (ref 9–17)
AST SERPL-CCNC: 21 U/L
BACTERIA URNS QL MICRO: ABNORMAL
BASOPHILS # BLD: 0.03 K/UL (ref 0–0.2)
BASOPHILS NFR BLD: 0 % (ref 0–2)
BILIRUB SERPL-MCNC: 0.3 MG/DL (ref 0.3–1.2)
BILIRUB UR QL STRIP: NEGATIVE
BUN SERPL-MCNC: 16 MG/DL (ref 8–23)
BUN/CREAT SERPL: 23 (ref 9–20)
CALCIUM SERPL-MCNC: 9.5 MG/DL (ref 8.6–10.4)
CHLORIDE SERPL-SCNC: 103 MMOL/L (ref 98–107)
CLARITY UR: CLEAR
CO2 SERPL-SCNC: 24 MMOL/L (ref 20–31)
COLOR UR: YELLOW
COMMENT: ABNORMAL
CREAT SERPL-MCNC: 0.7 MG/DL (ref 0.5–0.9)
EOSINOPHIL # BLD: 0.5 K/UL (ref 0–0.4)
EOSINOPHILS RELATIVE PERCENT: 7 % (ref 0–5)
EPI CELLS #/AREA URNS HPF: ABNORMAL /HPF
ERYTHROCYTE [DISTWIDTH] IN BLOOD BY AUTOMATED COUNT: 14.5 % (ref 12.1–15.2)
GFR SERPL CREATININE-BSD FRML MDRD: >60 ML/MIN/1.73M2
GLUCOSE SERPL-MCNC: 80 MG/DL (ref 70–99)
GLUCOSE UR STRIP-MCNC: NEGATIVE MG/DL
HCT VFR BLD AUTO: 37.8 % (ref 36–46)
HGB BLD-MCNC: 13.1 G/DL (ref 12–16)
HGB UR QL STRIP.AUTO: ABNORMAL
IMM GRANULOCYTES # BLD AUTO: 0 K/UL (ref 0–0.3)
IMM GRANULOCYTES NFR BLD: 0 % (ref 0–5)
KETONES UR STRIP-MCNC: NEGATIVE MG/DL
LEUKOCYTE ESTERASE UR QL STRIP: NEGATIVE
LYMPHOCYTES NFR BLD: 2.17 K/UL (ref 1–4.8)
LYMPHOCYTES RELATIVE PERCENT: 32 % (ref 15–40)
MCH RBC QN AUTO: 29 PG (ref 26–34)
MCHC RBC AUTO-ENTMCNC: 34.7 G/DL (ref 31–37)
MCV RBC AUTO: 83.6 FL (ref 80–100)
MONOCYTES NFR BLD: 0.86 K/UL (ref 0–1)
MONOCYTES NFR BLD: 13 % (ref 4–8)
NEUTROPHILS NFR BLD: 48 % (ref 47–75)
NEUTS SEG NFR BLD: 3.29 K/UL (ref 2.5–7)
NITRITE UR QL STRIP: NEGATIVE
PH UR STRIP: 6 [PH] (ref 5–8)
PLATELET # BLD AUTO: 319 K/UL (ref 140–450)
PMV BLD AUTO: 8.5 FL (ref 6–12)
POTASSIUM SERPL-SCNC: 4.2 MMOL/L (ref 3.7–5.3)
PROT SERPL-MCNC: 7.3 G/DL (ref 6.4–8.3)
PROT UR STRIP-MCNC: ABNORMAL MG/DL
RBC # BLD AUTO: 4.52 M/UL (ref 4–5.2)
RBC #/AREA URNS HPF: ABNORMAL /HPF (ref 0–2)
SODIUM SERPL-SCNC: 138 MMOL/L (ref 135–144)
SP GR UR STRIP: 1.01 (ref 1–1.03)
UROBILINOGEN UR STRIP-ACNC: NORMAL EU/DL (ref 0–1)
WBC #/AREA URNS HPF: ABNORMAL /HPF
WBC OTHER # BLD: 6.9 K/UL (ref 3.5–11)

## 2023-12-11 PROCEDURE — 36415 COLL VENOUS BLD VENIPUNCTURE: CPT

## 2023-12-11 PROCEDURE — 1036F TOBACCO NON-USER: CPT | Performed by: FAMILY MEDICINE

## 2023-12-11 PROCEDURE — 3078F DIAST BP <80 MM HG: CPT | Performed by: FAMILY MEDICINE

## 2023-12-11 PROCEDURE — 3074F SYST BP LT 130 MM HG: CPT | Performed by: FAMILY MEDICINE

## 2023-12-11 PROCEDURE — G8427 DOCREV CUR MEDS BY ELIG CLIN: HCPCS | Performed by: FAMILY MEDICINE

## 2023-12-11 PROCEDURE — G8419 CALC BMI OUT NRM PARAM NOF/U: HCPCS | Performed by: FAMILY MEDICINE

## 2023-12-11 PROCEDURE — 99214 OFFICE O/P EST MOD 30 MIN: CPT | Performed by: FAMILY MEDICINE

## 2023-12-11 PROCEDURE — 4130F TOPICAL PREP RX AOE: CPT | Performed by: FAMILY MEDICINE

## 2023-12-11 PROCEDURE — G8484 FLU IMMUNIZE NO ADMIN: HCPCS | Performed by: FAMILY MEDICINE

## 2023-12-11 PROCEDURE — 80053 COMPREHEN METABOLIC PANEL: CPT

## 2023-12-11 PROCEDURE — 85025 COMPLETE CBC W/AUTO DIFF WBC: CPT

## 2023-12-11 PROCEDURE — 3017F COLORECTAL CA SCREEN DOC REV: CPT | Performed by: FAMILY MEDICINE

## 2023-12-11 PROCEDURE — 81001 URINALYSIS AUTO W/SCOPE: CPT

## 2023-12-11 RX ORDER — PANTOPRAZOLE SODIUM 40 MG/1
40 TABLET, DELAYED RELEASE ORAL 2 TIMES DAILY
Qty: 180 TABLET | Refills: 1 | Status: SHIPPED | OUTPATIENT
Start: 2023-12-11

## 2023-12-11 ASSESSMENT — PATIENT HEALTH QUESTIONNAIRE - PHQ9
SUM OF ALL RESPONSES TO PHQ QUESTIONS 1-9: 0
2. FEELING DOWN, DEPRESSED OR HOPELESS: 0
SUM OF ALL RESPONSES TO PHQ9 QUESTIONS 1 & 2: 0
1. LITTLE INTEREST OR PLEASURE IN DOING THINGS: 0
SUM OF ALL RESPONSES TO PHQ QUESTIONS 1-9: 0

## 2023-12-11 NOTE — PROGRESS NOTES
Name: Bertin Vizcaino  : 1960         Chief Complaint:     Chief Complaint   Patient presents with    Hypertension    Gastroesophageal Reflux    Cough     10/22/23, viral uri, can't stop coughing    Urinary Urgency       History of Present Illness:      Bertin Vizcaino is a 59 y.o.  female who presents with Hypertension, Gastroesophageal Reflux, Cough (10/22/23, viral uri, can't stop coughing), and Urinary Urgency      HPI    Sick off and on since Sept with cough, nasal congestion, R ear discomfort. Using ear gtt but still having discomfort as well as otorrhea. Oral irritation, worse with certain foods. Carotid disease, saw vascular  and has been off asa, just on plavix. H/o GI bleed. Stopped iron \"a little bit ago\" d/t constipation. Remains on PPI BID. No dark stools or epigastric pain. BM at least once a day and lighter brown, not floating. Fast food does tend to upset stomach for a couple days. Thinks in the past aspirin had caused palpitations and she's not having those. Noticing she has to get to bathroom sooner than previous for urination. If she doesn't get pants down fast enough she'll leak a little bit. Going on for a month or 2 and doesn't happen with every instance of urination. Up a couple times during sleep or sometimes even q2h. Works 3rd shift and on weekends maintains similar sched, approx 3am-11am. At work drinks iced tea or water as well as an energy drink. Little bit of coffee while at work also. Foul-smelling urine recently.     Medical History:     Patient Active Problem List   Diagnosis    Chronic eczematous otitis externa of both ears    Essential hypertension    Infarction of right basal ganglia (HCC)    Pseudoaneurysm of carotid artery (HCC)    Peripheral vascular disease (HCC)    Atherosclerosis of native arteries of extremities with intermittent claudication, bilateral legs (HCC)       Medications:       Prior to Admission medications    Medication Sig Start Date End

## 2023-12-11 NOTE — PATIENT INSTRUCTIONS
[employer] SURVEY:    You may be receiving a survey from [employer] regarding your visit today. You may get this in the mail, through your MyChart or in your email. Please complete the survey to enable us to provide the highest quality of care to you and your family. If you cannot score us as very good ( 5 Stars) on any question, please feel free to call the office to discuss how we could have made your experience exceptional.     Thank you.     Clinical Care Team:   Dr. Garland Johnson, [address] [facility], [address]                                     Triage: Alin Donovan, [address] [facility] Team:    [address] [facility]                                      Corie Shepherd

## 2023-12-11 NOTE — RESULT ENCOUNTER NOTE
Your labs look great, anemia is resolved and liver is good, urine overall looks ok. No changes are needed.  She sent a prescription for thrush

## 2024-02-19 ENCOUNTER — HOSPITAL ENCOUNTER (OUTPATIENT)
Age: 64
Discharge: HOME | End: 2024-02-19
Payer: COMMERCIAL

## 2024-02-19 LAB
BASOPHILS # BLD: 0.02 K/UL (ref 0–0.2)
BASOPHILS NFR BLD: 0 % (ref 0–2)
EOSINOPHIL # BLD: 0.4 K/UL (ref 0–0.4)
EOSINOPHILS RELATIVE PERCENT: 6 % (ref 0–5)
ERYTHROCYTE [DISTWIDTH] IN BLOOD BY AUTOMATED COUNT: 13.1 % (ref 12.1–15.2)
HCT VFR BLD AUTO: 35.3 % (ref 36–46)
HGB BLD-MCNC: 12.3 G/DL (ref 12–16)
IMM GRANULOCYTES # BLD AUTO: 0 K/UL (ref 0–0.3)
IMM GRANULOCYTES NFR BLD: 0 % (ref 0–5)
LYMPHOCYTES NFR BLD: 2.19 K/UL (ref 1–4.8)
LYMPHOCYTES RELATIVE PERCENT: 32 % (ref 15–40)
MCH RBC QN AUTO: 30.3 PG (ref 26–34)
MCHC RBC AUTO-ENTMCNC: 34.8 G/DL (ref 31–37)
MCV RBC AUTO: 86.9 FL (ref 80–100)
MONOCYTES NFR BLD: 0.65 K/UL (ref 0–1)
MONOCYTES NFR BLD: 10 % (ref 4–8)
NEUTROPHILS NFR BLD: 52 % (ref 47–75)
NEUTS SEG NFR BLD: 3.53 K/UL (ref 2.5–7)
PLATELET # BLD AUTO: 306 K/UL (ref 140–450)
PMV BLD AUTO: 8.4 FL (ref 6–12)
RBC # BLD AUTO: 4.06 M/UL (ref 4–5.2)
WBC OTHER # BLD: 6.8 K/UL (ref 3.5–11)

## 2024-02-19 PROCEDURE — 85025 COMPLETE CBC W/AUTO DIFF WBC: CPT

## 2024-02-19 PROCEDURE — 36415 COLL VENOUS BLD VENIPUNCTURE: CPT

## 2024-04-16 ENCOUNTER — HOSPITAL ENCOUNTER (OUTPATIENT)
Dept: VASCULAR LAB | Age: 64
Discharge: HOME | End: 2024-04-18
Payer: COMMERCIAL

## 2024-04-16 DIAGNOSIS — I65.22 CAROTID ARTERY STENOSIS, ASYMPTOMATIC, LEFT: ICD-10-CM

## 2024-04-16 PROCEDURE — 93880 EXTRACRANIAL BILAT STUDY: CPT

## 2024-04-17 ENCOUNTER — OFFICE VISIT (OUTPATIENT)
Dept: VASCULAR SURGERY | Age: 64
End: 2024-04-17
Payer: COMMERCIAL

## 2024-04-17 VITALS
BODY MASS INDEX: 18.09 KG/M2 | RESPIRATION RATE: 18 BRPM | SYSTOLIC BLOOD PRESSURE: 129 MMHG | DIASTOLIC BLOOD PRESSURE: 64 MMHG | TEMPERATURE: 97.4 F | HEIGHT: 66 IN | HEART RATE: 80 BPM | WEIGHT: 112.6 LBS

## 2024-04-17 DIAGNOSIS — I65.22 CAROTID ARTERY STENOSIS, ASYMPTOMATIC, LEFT: Primary | ICD-10-CM

## 2024-04-17 DIAGNOSIS — I73.9 PAD (PERIPHERAL ARTERY DISEASE) (HCC): ICD-10-CM

## 2024-04-17 PROCEDURE — 99215 OFFICE O/P EST HI 40 MIN: CPT | Performed by: INTERNAL MEDICINE

## 2024-04-17 PROCEDURE — 3078F DIAST BP <80 MM HG: CPT | Performed by: INTERNAL MEDICINE

## 2024-04-17 PROCEDURE — 3074F SYST BP LT 130 MM HG: CPT | Performed by: INTERNAL MEDICINE

## 2024-04-17 RX ORDER — ALBUTEROL SULFATE 90 UG/1
1 AEROSOL, METERED RESPIRATORY (INHALATION) EVERY 6 HOURS PRN
COMMUNITY
Start: 2024-04-07

## 2024-04-17 NOTE — PATIENT INSTRUCTIONS
SURVEY:    Thank you for allowing us to care for you today.    You may be receiving a survey from [facility] regarding your visit today- electronically or via mail.      Please help us by completing the survey as this will provide the needed feedback to ensure we are providing the very best care for you and your family.    If you cannot score us a very good on any question, please call the office to discuss how we could have made your experience a very good one.    Thank you.       STAFF:    Ophelia Engle, Negra Crow, Cristina Guillaume      CLINICAL STAFF:    Jessenia Loyola LPN, Mary Dos Santos LPN, Dot Ventura LPN, Svitlana Chandler CMA

## 2024-04-17 NOTE — PROGRESS NOTES
Riya Bowser was seen on 4/17/2024 for   Chief Complaint   Patient presents with    Peripheral artery disease     Patient here for 8 month follow up. Patient states legs feel much better since last visit. Denies leg pain or swelling. Completed carotid duplex 4/16/24.   .  7/6/22 Pt was evaluated by Dr Delos Reyes today for claudication (see note) after being referred by Dr Farrell. She has lifestyle limiting claudication right leg with an luz of 0.72. It inhibits her ability even to walk from her car to work.  She has had heart racing on cilostazol, forcing her to decrease dose. Not helping with leg pain. Has headache as well.   UPDATE 8/17/22 On 8/2/22 she had RCIA Visipro, REIA Visipro prox, and ZPTX distal. Right leg cramping is gone. She gets mild left leg pain with walking. Angio showed only  a mild narrowing in LCFA (right beside access sheath, requiring manual pull). Left groin healed well.   Addendum. Her hx includes a carotid stent by Peyton Hernandez at [facility]. 10mm X31 WALLSTENT. 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 Followed by him.  UPDATE 2/22/23 Arms feel weak, can't seem to gain strength.Don't cramp. Numbness in hands. Right leg feels fine. Left calf has mild discomfort with walking, but no activity limitation. Angio was remarkable only for mild cfa narrowing on left. Has been on crestor 2 yrs.   UPDATE 8/23/23 Was doing ok until 5/3/23 when she had severe GI bleed. Hgb drop to 6.6. [facility] EGD found 4 ulcers. Clipped. No bleeding since. Just got ok to go back on asa if she stays on protonix. Last CT to check carotid stent looked ok.. 4/4/23. No f/u scheduled. Left calf hurts. LUZ 8/21/23 R .95  l  0.99. Back at work.   UPDATE 4/17/24 Had carotid duplex 4/16/24 ([place]) howard < 50, lica < 50, ante verts, left stent is patent. Legs feel ok with walking. No further bleeding. No focal neuro sx.            Social History     Socioeconomic History    Marital status:

## 2024-04-17 NOTE — PROGRESS NOTES
Riyadelonte Bowser was seen on 4/17/2024 for   Chief Complaint   Patient presents with    Peripheral artery disease     Patient here for 8 month follow up. Patient states legs feel much better since last visit. Denies leg pain or swelling. Completed carotid duplex 4/16/24.   .                            REVIEW OF SYSTEMS    Constitutional Weight: absent, A, Fatigue: absent Fever: absent   HEENT Ears: normal,Visual disturbance: absent Sore throat: absent,    Respiratory Shortness of breath: absent, Cough: absent;, Snoring: absent   Cardivascular Chest pain: absent,  Leg pain: absent,Leg swelling:absent, Non-healing wound:absent    GI Diarrhea: absent, Abdominal Pain: absent    Urinary frequency: absent, Urinary incontinence: absent   Musculoskeletal Neck pain: absent, Back pain: present, Restless Leg:absent     Dermatological Hair loss: absent, Skin changes: absent   Neurological  Sudden Loss of Vision in one eye:absent, Slurred Speech:absent, Weakness on one side of body: absent,Headache: absent   Psychiatric Anxiety: absent, Depression: absent   Hematologic Abnormal bleeding: absent,

## 2024-06-03 RX ORDER — AMLODIPINE BESYLATE 5 MG/1
5 TABLET ORAL DAILY
Qty: 90 TABLET | Refills: 1 | Status: SHIPPED | OUTPATIENT
Start: 2024-06-03 | End: 2024-06-03 | Stop reason: SDUPTHER

## 2024-06-03 RX ORDER — PANTOPRAZOLE SODIUM 40 MG/1
40 TABLET, DELAYED RELEASE ORAL 2 TIMES DAILY
Qty: 180 TABLET | Refills: 1 | Status: SHIPPED | OUTPATIENT
Start: 2024-06-03

## 2024-06-03 RX ORDER — AMLODIPINE BESYLATE 5 MG/1
5 TABLET ORAL DAILY
Qty: 90 TABLET | Refills: 1 | Status: SHIPPED | OUTPATIENT
Start: 2024-06-03

## 2024-06-03 RX ORDER — LOSARTAN POTASSIUM 50 MG/1
50 TABLET ORAL DAILY
Qty: 90 TABLET | Refills: 1 | Status: SHIPPED | OUTPATIENT
Start: 2024-06-03

## 2024-06-03 RX ORDER — CLOPIDOGREL BISULFATE 75 MG/1
75 TABLET ORAL DAILY
Qty: 90 TABLET | Refills: 3 | Status: SHIPPED | OUTPATIENT
Start: 2024-06-03 | End: 2025-05-29

## 2024-06-03 RX ORDER — ROSUVASTATIN CALCIUM 40 MG/1
40 TABLET, COATED ORAL DAILY
Qty: 90 TABLET | Refills: 1 | Status: SHIPPED | OUTPATIENT
Start: 2024-06-03

## 2024-06-03 NOTE — TELEPHONE ENCOUNTER
Last OV: 12/11/2023        Pt called in for med refills , meds pending approval to [facility].      Thank you

## 2024-06-03 NOTE — TELEPHONE ENCOUNTER
Last visit:  12/11/2023  Next Visit Date:    Future Appointments   Date Time Provider Department Center   4/16/2025  2:00 PM Brandin Moreno MD [facility]         Medication List:  Prior to Admission medications    Medication Sig Start Date End Date Taking? Authorizing Provider   albuterol sulfate HFA (PROVENTIL;VENTOLIN;PROAIR) 108 (90 Base) MCG/ACT inhaler Inhale 1 puff into the lungs every 6 hours as needed for Shortness of Breath 4/7/24   Cullen Patterson MD   pantoprazole (PROTONIX) 40 MG tablet Take 1 tablet by mouth 2 times daily 12/11/23   Lory Farrell DO   amLODIPine (NORVASC) 5 MG tablet Take 1 tablet by mouth daily 12/4/23   Lory Farrell DO   losartan (COZAAR) 50 MG tablet Take 1 tablet by mouth daily 12/4/23   Lory Farrell DO   rosuvastatin (CRESTOR) 40 MG tablet Take 1 tablet by mouth daily 12/4/23   Lory Farrell DO   clopidogrel (PLAVIX) 75 MG tablet Take 1 tablet by mouth daily 8/23/23 8/17/24  Brandin Moreno MD   fluocinolone (DERMOTIC) 0.01 % OIL oil PLACE 4 DROPS INTO THE AFFECTED EAR(S) TWICE DAILY FOR 2 WEEKS THEN DECREASE TO 1-2 TIMES A WEEK FOR MAINTENANCE 8/14/23   Lory Farrell DO   Probiotic Product (PROBIOTIC-10) CAPS Take by mouth daily  Patient not taking: Reported on 4/17/2024    Cullen Patterson MD   Multiple Vitamin (MULTI-VITAMIN DAILY PO) Take by mouth    Cullen Patterson MD   Ascorbic Acid (VITAMIN C) 250 MG tablet Take 1 tablet by mouth daily    Cullen Patterson MD   Cholecalciferol (VITAMIN D3) 2000 units CAPS Take by mouth    Cullen Patterson MD   acetaminophen (TYLENOL) 325 MG tablet Take 1 tablet by mouth every 6 hours as needed for Pain    Cullen Patterson MD

## 2024-06-11 RX ORDER — ROSUVASTATIN CALCIUM 40 MG/1
40 TABLET, COATED ORAL DAILY
Qty: 90 TABLET | Refills: 1 | OUTPATIENT
Start: 2024-06-11

## 2024-06-27 RX ORDER — PANTOPRAZOLE SODIUM 40 MG/1
40 TABLET, DELAYED RELEASE ORAL 2 TIMES DAILY
Qty: 180 TABLET | Refills: 1 | Status: SHIPPED | OUTPATIENT
Start: 2024-06-27

## 2024-06-27 NOTE — TELEPHONE ENCOUNTER
Last visit:  12/11/2023  Next Visit Date:    Future Appointments   Date Time Provider Department Center   4/16/2025  2:00 PM Brandin Moreno MD [facility]         Medication List:  Prior to Admission medications    Medication Sig Start Date End Date Taking? Authorizing Provider   amLODIPine (NORVASC) 5 MG tablet Take 1 tablet by mouth daily 6/3/24   Lory Farrell DO   rosuvastatin (CRESTOR) 40 MG tablet Take 1 tablet by mouth daily 6/3/24   Lory Farrell DO   losartan (COZAAR) 50 MG tablet Take 1 tablet by mouth daily 6/3/24   Lory Farrell DO   clopidogrel (PLAVIX) 75 MG tablet Take 1 tablet by mouth daily 6/3/24 5/29/25  Lory Farrell DO   pantoprazole (PROTONIX) 40 MG tablet Take 1 tablet by mouth 2 times daily 6/3/24   Lory Farrell DO   albuterol sulfate HFA (PROVENTIL;VENTOLIN;PROAIR) 108 (90 Base) MCG/ACT inhaler Inhale 1 puff into the lungs every 6 hours as needed for Shortness of Breath 4/7/24   Cullen Patterson MD   fluocinolone (DERMOTIC) 0.01 % OIL oil PLACE 4 DROPS INTO THE AFFECTED EAR(S) TWICE DAILY FOR 2 WEEKS THEN DECREASE TO 1-2 TIMES A WEEK FOR MAINTENANCE 8/14/23   Lory Farrell DO   Probiotic Product (PROBIOTIC-10) CAPS Take by mouth daily  Patient not taking: Reported on 4/17/2024    Cullen Patterson MD   Multiple Vitamin (MULTI-VITAMIN DAILY PO) Take by mouth    Cullen Patterson MD   Ascorbic Acid (VITAMIN C) 250 MG tablet Take 1 tablet by mouth daily    Cullen Patterson MD   Cholecalciferol (VITAMIN D3) 2000 units CAPS Take by mouth    Cullen Patterson MD   acetaminophen (TYLENOL) 325 MG tablet Take 1 tablet by mouth every 6 hours as needed for Pain    Cullen Patterson MD

## 2024-08-12 RX ORDER — NEOMYCIN SULFATE, POLYMYXIN B SULFATE, HYDROCORTISONE 3.5; 10000; 1 MG/ML; [USP'U]/ML; MG/ML
SOLUTION/ DROPS AURICULAR (OTIC)
Qty: 10 ML | OUTPATIENT
Start: 2024-08-12

## 2024-08-12 RX ORDER — NYSTATIN 100000 [USP'U]/ML
SUSPENSION ORAL
Qty: 140 ML | Refills: 0 | OUTPATIENT
Start: 2024-08-12

## 2024-08-28 ENCOUNTER — OFFICE VISIT (OUTPATIENT)
Dept: FAMILY MEDICINE CLINIC | Age: 64
End: 2024-08-28
Payer: COMMERCIAL

## 2024-08-28 VITALS
WEIGHT: 122 LBS | HEART RATE: 83 BPM | SYSTOLIC BLOOD PRESSURE: 136 MMHG | DIASTOLIC BLOOD PRESSURE: 74 MMHG | HEIGHT: 66 IN | OXYGEN SATURATION: 96 % | BODY MASS INDEX: 19.61 KG/M2

## 2024-08-28 DIAGNOSIS — H60.8X3 CHRONIC ECZEMATOUS OTITIS EXTERNA OF BOTH EARS: ICD-10-CM

## 2024-08-28 DIAGNOSIS — G56.03 BILATERAL CARPAL TUNNEL SYNDROME: Primary | ICD-10-CM

## 2024-08-28 DIAGNOSIS — M19.049 HAND ARTHRITIS: ICD-10-CM

## 2024-08-28 PROCEDURE — 99214 OFFICE O/P EST MOD 30 MIN: CPT | Performed by: FAMILY MEDICINE

## 2024-08-28 PROCEDURE — 3075F SYST BP GE 130 - 139MM HG: CPT | Performed by: FAMILY MEDICINE

## 2024-08-28 PROCEDURE — 3078F DIAST BP <80 MM HG: CPT | Performed by: FAMILY MEDICINE

## 2024-08-28 RX ORDER — NEOMYCIN SULFATE, POLYMYXIN B SULFATE, HYDROCORTISONE 3.5; 10000; 1 MG/ML; [USP'U]/ML; MG/ML
4 SOLUTION/ DROPS AURICULAR (OTIC) 3 TIMES DAILY
Qty: 1 EACH | Refills: 0 | Status: SHIPPED | OUTPATIENT
Start: 2024-08-28 | End: 2024-09-07

## 2024-08-28 RX ORDER — FLUOCINOLONE ACETONIDE 0.11 MG/ML
OIL AURICULAR (OTIC)
Qty: 20 ML | Refills: 2 | Status: SHIPPED | OUTPATIENT
Start: 2024-08-28

## 2024-08-28 SDOH — ECONOMIC STABILITY: FOOD INSECURITY: WITHIN THE PAST 12 MONTHS, YOU WORRIED THAT YOUR FOOD WOULD RUN OUT BEFORE YOU GOT MONEY TO BUY MORE.: NEVER TRUE

## 2024-08-28 SDOH — ECONOMIC STABILITY: FOOD INSECURITY: WITHIN THE PAST 12 MONTHS, THE FOOD YOU BOUGHT JUST DIDN'T LAST AND YOU DIDN'T HAVE MONEY TO GET MORE.: NEVER TRUE

## 2024-08-28 SDOH — ECONOMIC STABILITY: INCOME INSECURITY: HOW HARD IS IT FOR YOU TO PAY FOR THE VERY BASICS LIKE FOOD, HOUSING, MEDICAL CARE, AND HEATING?: NOT HARD AT ALL

## 2024-08-28 ASSESSMENT — PATIENT HEALTH QUESTIONNAIRE - PHQ9
SUM OF ALL RESPONSES TO PHQ QUESTIONS 1-9: 0
2. FEELING DOWN, DEPRESSED OR HOPELESS: NOT AT ALL
1. LITTLE INTEREST OR PLEASURE IN DOING THINGS: NOT AT ALL
SUM OF ALL RESPONSES TO PHQ9 QUESTIONS 1 & 2: 0
SUM OF ALL RESPONSES TO PHQ QUESTIONS 1-9: 0

## 2024-08-28 NOTE — PATIENT INSTRUCTIONS
SURVEY:    You may be receiving a survey from [employer] regarding your visit today.    Please complete the survey to enable us to provide the highest quality of care to you and your family.    If you cannot score us a very good on any question, please call the office to discuss how we could have made your experience a very good one.    Thank you.

## 2024-08-28 NOTE — PROGRESS NOTES
Name: Riya Bowser  : 1960         Chief Complaint:     Chief Complaint   Patient presents with    Numbness     Pt c/o numbness in both hands, states it wakes her up when the numbness stops its pain shooting down her hands. Pt states this has been ongoing for about 1-2 months.     Ear Drainage     Pt would like refill on fluoconide oil, denies any complaints at this time, oil drops work well for her.        History of Present Illness:      Riya Bowser is a 64 y.o.  female who presents with Numbness (Pt c/o numbness in both hands, states it wakes her up when the numbness stops its pain shooting down her hands. Pt states this has been ongoing for about 1-2 months. ) and Ear Drainage (Pt would like refill on fluoconide oil, denies any complaints at this time, oil drops work well for her. )      HPI    Carlos Eduardo hand numbness which has been going on for a long time but more persistent and now painful also, pulsating pain in both hands. Wakes her up from sleep, has to sit with hands down and wait for it to go away. Waking up this way about every 2h. Started melatonin gummies which allow her to sleep 4h before waking with pain. Also uses arthritis gloves which give some compression, copper wrist straps.  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    Also noticing R 4th finger doesn't sit in line with others and doesn't quite straighten out. Fingers sore. No nsaid's d/t h/o UGIB, plavix for h/o stroke. Stays on her protonix and has done better about eating on a regular basis.    Carlos Eduardo ears chronically itchy, worse now and draining. Ran out of steroid oil.    Medical History:     Patient Active Problem List   Diagnosis    Chronic eczematous otitis externa of both ears    Essential hypertension    Infarction of right basal ganglia (HCC)    Pseudoaneurysm of carotid artery (HCC)    Peripheral vascular  Nickel    Physical Exam:     Vitals:  /74 (Site: Left Upper Arm, Position: Sitting)   Pulse 83   Ht 1.676 m (5' 6\")   Wt 55.3 kg (122 lb)   LMP  (LMP Unknown)   SpO2 96%   BMI 19.69 kg/m²   Physical Exam  Vitals and nursing note reviewed.   Constitutional:       General: She is not in acute distress.     Appearance: She is well-developed.   HENT:      Ears:      Comments: Carlos Eduardo EAM, EAC, bowl erythematous, inc wrinkling present. Mod-large amt white-pale yellow creamy otorrhea bilaterally.  Pulmonary:      Effort: Pulmonary effort is normal.   Musculoskeletal:      Comments: Marked OA changes carlos eduardo hands, heberden's nodes, widening of PIP's, worst in R 4th PIP which lacks a few degrees of passive extension. No nodularity or tenderness of that finger's flexor tendon and no triggering.  Carlos Eduardo thenar atrophy R>L. Positive tinel and phalen bilaterally.   Skin:     General: Skin is warm and dry.   Neurological:      Mental Status: She is alert and oriented to person, place, and time.   Psychiatric:         Mood and Affect: Mood normal.         Behavior: Behavior normal.         Data:     Lab Results   Component Value Date/Time     12/11/2023 08:53 AM    K 4.2 12/11/2023 08:53 AM     12/11/2023 08:53 AM    CO2 24 12/11/2023 08:53 AM    BUN 16 12/11/2023 08:53 AM    CREATININE 0.7 12/11/2023 08:53 AM    GLUCOSE 80 12/11/2023 08:53 AM    BILITOT 0.3 12/11/2023 08:53 AM    ALKPHOS 126 12/11/2023 08:53 AM    AST 21 12/11/2023 08:53 AM    ALT 16 12/11/2023 08:53 AM     Lab Results   Component Value Date/Time    WBC 6.8 02/19/2024 03:54 PM    RBC 4.06 02/19/2024 03:54 PM    HGB 12.3 02/19/2024 03:54 PM    HCT 35.3 02/19/2024 03:54 PM    MCV 86.9 02/19/2024 03:54 PM    MCH 30.3 02/19/2024 03:54 PM    MCHC 34.8 02/19/2024 03:54 PM    RDW 13.1 02/19/2024 03:54 PM     02/19/2024 03:54 PM    MPV 8.4 02/19/2024 03:54 PM     Lab Results   Component Value Date/Time    TSH 2.00 05/12/2023 03:28 PM     Lab Results

## 2024-09-16 ENCOUNTER — HOSPITAL ENCOUNTER (OUTPATIENT)
Age: 64
Discharge: HOME | End: 2024-09-18
Payer: COMMERCIAL

## 2024-09-16 ENCOUNTER — HOSPITAL ENCOUNTER (OUTPATIENT)
Dept: GENERAL RADIOLOGY | Age: 64
Discharge: HOME | End: 2024-09-18
Payer: COMMERCIAL

## 2024-09-16 DIAGNOSIS — R20.2 RIGHT HAND PARESTHESIA: ICD-10-CM

## 2024-09-16 DIAGNOSIS — R20.2 LEFT HAND PARESTHESIA: ICD-10-CM

## 2024-09-16 PROCEDURE — 72040 X-RAY EXAM NECK SPINE 2-3 VW: CPT

## 2024-12-02 RX ORDER — AMLODIPINE BESYLATE 5 MG/1
5 TABLET ORAL DAILY
Qty: 90 TABLET | Refills: 1 | Status: SHIPPED | OUTPATIENT
Start: 2024-12-02

## 2024-12-02 RX ORDER — LOSARTAN POTASSIUM 50 MG/1
50 TABLET ORAL DAILY
Qty: 90 TABLET | Refills: 1 | Status: SHIPPED | OUTPATIENT
Start: 2024-12-02

## 2024-12-02 RX ORDER — ROSUVASTATIN CALCIUM 40 MG/1
40 TABLET, COATED ORAL DAILY
Qty: 90 TABLET | Refills: 1 | Status: SHIPPED | OUTPATIENT
Start: 2024-12-02

## 2024-12-02 NOTE — TELEPHONE ENCOUNTER
Last visit:  8/28/2024  Next Visit Date:    Future Appointments   Date Time Provider Department Center   3/3/2025  3:20 PM Lory Farrell DO [facility]   4/16/2025  2:00 PM Brandin Moreno MD [facility]         Medication List:  Prior to Admission medications    Medication Sig Start Date End Date Taking? Authorizing Provider   fluocinolone (DERMOTIC) 0.01 % OIL oil PLACE 4 DROPS INTO THE AFFECTED EAR(S) TWICE DAILY FOR 2 WEEKS THEN DECREASE TO 1-2 TIMES A WEEK FOR MAINTENANCE 8/28/24   Lory Farrell DO   pantoprazole (PROTONIX) 40 MG tablet take 1 tablet by mouth twice a day 6/27/24   Lory Farrell DO   amLODIPine (NORVASC) 5 MG tablet Take 1 tablet by mouth daily 6/3/24   Lory Farrell DO   rosuvastatin (CRESTOR) 40 MG tablet Take 1 tablet by mouth daily 6/3/24   Lory Farrell DO   losartan (COZAAR) 50 MG tablet Take 1 tablet by mouth daily 6/3/24   Lory Farrell DO   clopidogrel (PLAVIX) 75 MG tablet Take 1 tablet by mouth daily 6/3/24 5/29/25  Lory Farrell DO   albuterol sulfate HFA (PROVENTIL;VENTOLIN;PROAIR) 108 (90 Base) MCG/ACT inhaler Inhale 1 puff into the lungs every 6 hours as needed for Shortness of Breath 4/7/24   Cullen Patterson MD   Probiotic Product (PROBIOTIC-10) CAPS Take by mouth daily  Patient not taking: Reported on 4/17/2024    Cullen Patterson MD   Multiple Vitamin (MULTI-VITAMIN DAILY PO) Take by mouth    Cullen Patterson MD   Ascorbic Acid (VITAMIN C) 250 MG tablet Take 1 tablet by mouth daily    Cullen Patterson MD   Cholecalciferol (VITAMIN D3) 2000 units CAPS Take by mouth    Cullen Patterson MD   acetaminophen (TYLENOL) 325 MG tablet Take 1 tablet by mouth every 6 hours as needed for Pain    Cullen Patterson MD

## 2024-12-13 RX ORDER — PANTOPRAZOLE SODIUM 40 MG/1
40 TABLET, DELAYED RELEASE ORAL 2 TIMES DAILY
Qty: 180 TABLET | Refills: 1 | Status: SHIPPED | OUTPATIENT
Start: 2024-12-13

## 2024-12-13 NOTE — TELEPHONE ENCOUNTER
Last OV: 8/28/2024 hand arthritis, bilateral carpal tunnel   Last RX:    Next scheduled apt: 3/3/2025 6 months            Pt requesting a refill   Medication pending for approval

## 2025-02-28 SDOH — ECONOMIC STABILITY: FOOD INSECURITY: WITHIN THE PAST 12 MONTHS, THE FOOD YOU BOUGHT JUST DIDN'T LAST AND YOU DIDN'T HAVE MONEY TO GET MORE.: NEVER TRUE

## 2025-02-28 SDOH — ECONOMIC STABILITY: INCOME INSECURITY: IN THE LAST 12 MONTHS, WAS THERE A TIME WHEN YOU WERE NOT ABLE TO PAY THE MORTGAGE OR RENT ON TIME?: NO

## 2025-02-28 SDOH — ECONOMIC STABILITY: FOOD INSECURITY: WITHIN THE PAST 12 MONTHS, YOU WORRIED THAT YOUR FOOD WOULD RUN OUT BEFORE YOU GOT MONEY TO BUY MORE.: NEVER TRUE

## 2025-02-28 SDOH — ECONOMIC STABILITY: TRANSPORTATION INSECURITY
IN THE PAST 12 MONTHS, HAS THE LACK OF TRANSPORTATION KEPT YOU FROM MEDICAL APPOINTMENTS OR FROM GETTING MEDICATIONS?: NO

## 2025-02-28 ASSESSMENT — PATIENT HEALTH QUESTIONNAIRE - PHQ9
2. FEELING DOWN, DEPRESSED OR HOPELESS: SEVERAL DAYS
SUM OF ALL RESPONSES TO PHQ9 QUESTIONS 1 & 2: 2
1. LITTLE INTEREST OR PLEASURE IN DOING THINGS: SEVERAL DAYS
1. LITTLE INTEREST OR PLEASURE IN DOING THINGS: SEVERAL DAYS
2. FEELING DOWN, DEPRESSED OR HOPELESS: SEVERAL DAYS
SUM OF ALL RESPONSES TO PHQ QUESTIONS 1-9: 2

## 2025-03-03 ENCOUNTER — HOSPITAL ENCOUNTER (OUTPATIENT)
Age: 65
Discharge: HOME | End: 2025-03-03
Payer: COMMERCIAL

## 2025-03-03 ENCOUNTER — OFFICE VISIT (OUTPATIENT)
Dept: FAMILY MEDICINE CLINIC | Age: 65
End: 2025-03-03
Payer: COMMERCIAL

## 2025-03-03 VITALS
DIASTOLIC BLOOD PRESSURE: 60 MMHG | WEIGHT: 114 LBS | BODY MASS INDEX: 18.32 KG/M2 | HEIGHT: 66 IN | SYSTOLIC BLOOD PRESSURE: 130 MMHG | HEART RATE: 104 BPM | OXYGEN SATURATION: 98 %

## 2025-03-03 DIAGNOSIS — G56.03 BILATERAL CARPAL TUNNEL SYNDROME: Primary | ICD-10-CM

## 2025-03-03 DIAGNOSIS — Z86.73 HISTORY OF STROKE: ICD-10-CM

## 2025-03-03 DIAGNOSIS — Z13.6 SCREENING FOR CARDIOVASCULAR CONDITION: ICD-10-CM

## 2025-03-03 DIAGNOSIS — Z87.19 HISTORY OF GI BLEED: ICD-10-CM

## 2025-03-03 DIAGNOSIS — F32.1 CURRENT MODERATE EPISODE OF MAJOR DEPRESSIVE DISORDER WITHOUT PRIOR EPISODE (HCC): ICD-10-CM

## 2025-03-03 LAB
ALBUMIN SERPL-MCNC: 4.3 G/DL (ref 3.5–5.2)
ALBUMIN/GLOB SERPL: 1.5 {RATIO} (ref 1–2.5)
ALP SERPL-CCNC: 112 U/L (ref 35–104)
ALT SERPL-CCNC: 25 U/L (ref 5–33)
ANION GAP SERPL CALCULATED.3IONS-SCNC: 10 MMOL/L (ref 9–17)
AST SERPL-CCNC: 25 U/L
BASOPHILS # BLD: 0.02 K/UL (ref 0–0.2)
BASOPHILS NFR BLD: 0 % (ref 0–2)
BILIRUB SERPL-MCNC: 0.4 MG/DL (ref 0.3–1.2)
BUN SERPL-MCNC: 18 MG/DL (ref 8–23)
CALCIUM SERPL-MCNC: 9.5 MG/DL (ref 8.6–10.4)
CHLORIDE SERPL-SCNC: 103 MMOL/L (ref 98–107)
CHOLEST SERPL-MCNC: 126 MG/DL (ref 0–199)
CHOLESTEROL/HDL RATIO: 2.6
CO2 SERPL-SCNC: 27 MMOL/L (ref 20–31)
CREAT SERPL-MCNC: 0.9 MG/DL (ref 0.5–0.9)
EOSINOPHIL # BLD: 0.15 K/UL (ref 0–0.4)
EOSINOPHILS RELATIVE PERCENT: 2 % (ref 0–5)
ERYTHROCYTE [DISTWIDTH] IN BLOOD BY AUTOMATED COUNT: 13.1 % (ref 12.1–15.2)
GFR, ESTIMATED: 72 ML/MIN/1.73M2
GLUCOSE SERPL-MCNC: 95 MG/DL (ref 70–99)
HCT VFR BLD AUTO: 38.6 % (ref 36–46)
HDLC SERPL-MCNC: 48 MG/DL
HGB BLD-MCNC: 13.5 G/DL (ref 12–16)
IMM GRANULOCYTES # BLD AUTO: 0.01 K/UL (ref 0–0.3)
IMM GRANULOCYTES NFR BLD: 0 % (ref 0–5)
LDLC SERPL CALC-MCNC: 45 MG/DL (ref 0–100)
LYMPHOCYTES NFR BLD: 1.72 K/UL (ref 1–4.8)
LYMPHOCYTES RELATIVE PERCENT: 25 % (ref 15–40)
MCH RBC QN AUTO: 30.3 PG (ref 26–34)
MCHC RBC AUTO-ENTMCNC: 35 G/DL (ref 31–37)
MCV RBC AUTO: 86.7 FL (ref 80–100)
MONOCYTES NFR BLD: 0.59 K/UL (ref 0–1)
MONOCYTES NFR BLD: 9 % (ref 4–8)
NEUTROPHILS NFR BLD: 64 % (ref 47–75)
NEUTS SEG NFR BLD: 4.38 K/UL (ref 2.5–7)
PLATELET # BLD AUTO: 333 K/UL (ref 140–450)
PMV BLD AUTO: 8.6 FL (ref 6–12)
POTASSIUM SERPL-SCNC: 4.9 MMOL/L (ref 3.7–5.3)
PROT SERPL-MCNC: 7.2 G/DL (ref 6.4–8.3)
RBC # BLD AUTO: 4.45 M/UL (ref 4–5.2)
SODIUM SERPL-SCNC: 140 MMOL/L (ref 135–144)
TRIGL SERPL-MCNC: 163 MG/DL
VLDLC SERPL CALC-MCNC: 33 MG/DL (ref 1–30)
WBC OTHER # BLD: 6.9 K/UL (ref 3.5–11)

## 2025-03-03 PROCEDURE — 3078F DIAST BP <80 MM HG: CPT | Performed by: FAMILY MEDICINE

## 2025-03-03 PROCEDURE — 99214 OFFICE O/P EST MOD 30 MIN: CPT | Performed by: FAMILY MEDICINE

## 2025-03-03 PROCEDURE — 85025 COMPLETE CBC W/AUTO DIFF WBC: CPT

## 2025-03-03 PROCEDURE — 80053 COMPREHEN METABOLIC PANEL: CPT

## 2025-03-03 PROCEDURE — 3075F SYST BP GE 130 - 139MM HG: CPT | Performed by: FAMILY MEDICINE

## 2025-03-03 PROCEDURE — 36415 COLL VENOUS BLD VENIPUNCTURE: CPT

## 2025-03-03 PROCEDURE — 80061 LIPID PANEL: CPT

## 2025-03-03 RX ORDER — DULOXETIN HYDROCHLORIDE 30 MG/1
30 CAPSULE, DELAYED RELEASE ORAL DAILY
Qty: 30 CAPSULE | Refills: 3 | Status: SHIPPED | OUTPATIENT
Start: 2025-03-03

## 2025-03-03 NOTE — PROGRESS NOTES
Name: Riya Bowser  : 1960         Chief Complaint:     Chief Complaint   Patient presents with    Hypertension       History of Present Illness:      Riya Bowser is a 64 y.o.  female who presents with Hypertension      HPI    Ongoing jason hand paresthesia overnight and at work. Saw ortho and was recommended EMG but never heard from scheduling. Taking tylenol which helps a little.     Stressed and depressed, working a lot and in danger of losing job d/t points, missed some days d/t weather. Worse in winter which has been chronic. Works 3rd shift and barely sees sunlight this time of yr. Taking melatonin most days and then sleeps better but stays tired. For several mos boyfriend has been working in [place] during the week, comes home on weekends to visit her which then throws off her sleep sched. They've been living together for over 10 yrs. He does plan to come back to [place] permanently soon.    PUD - stomach doing well, taking protonix BID religiously.     Medical History:     Patient Active Problem List   Diagnosis    Chronic eczematous otitis externa of both ears    Essential hypertension    Infarction of right basal ganglia (HCC)    Pseudoaneurysm of carotid artery    Peripheral vascular disease    Atherosclerosis of native arteries of extremities with intermittent claudication, bilateral legs       Medications:       Prior to Admission medications    Medication Sig Start Date End Date Taking? Authorizing Provider   DULoxetine (CYMBALTA) 30 MG extended release capsule Take 1 capsule by mouth daily 3/3/25  Yes Lory Farrell DO   pantoprazole (PROTONIX) 40 MG tablet Take 1 tablet by mouth 2 times daily 24  Yes Lory Farrell DO   amLODIPine (NORVASC) 5 MG tablet Take 1 tablet by mouth daily 24  Yes Lory Farrell DO   losartan (COZAAR) 50 MG tablet Take 1 tablet by mouth daily 24  Yes Lory Farrell DO   rosuvastatin (CRESTOR) 40 MG tablet Take 1 tablet by mouth daily

## 2025-04-10 DIAGNOSIS — I73.9 PAD (PERIPHERAL ARTERY DISEASE): Primary | ICD-10-CM

## 2025-04-10 DIAGNOSIS — I65.22 CAROTID ARTERY STENOSIS, ASYMPTOMATIC, LEFT: ICD-10-CM

## 2025-04-10 NOTE — PROGRESS NOTES
Scheduling called and left a voicemail that they need a new order for the carotid scan and CHERYLE's  because they  the . Orders placed.

## 2025-04-16 ENCOUNTER — HOSPITAL ENCOUNTER (OUTPATIENT)
Dept: VASCULAR LAB | Age: 65
Discharge: HOME | End: 2025-04-18
Payer: COMMERCIAL

## 2025-04-16 ENCOUNTER — OFFICE VISIT (OUTPATIENT)
Dept: VASCULAR SURGERY | Age: 65
End: 2025-04-16
Payer: COMMERCIAL

## 2025-04-16 VITALS
HEART RATE: 91 BPM | DIASTOLIC BLOOD PRESSURE: 67 MMHG | SYSTOLIC BLOOD PRESSURE: 124 MMHG | HEIGHT: 66 IN | BODY MASS INDEX: 17.94 KG/M2 | RESPIRATION RATE: 18 BRPM | WEIGHT: 111.6 LBS | TEMPERATURE: 96.8 F

## 2025-04-16 DIAGNOSIS — I73.9 PAD (PERIPHERAL ARTERY DISEASE): ICD-10-CM

## 2025-04-16 DIAGNOSIS — I65.22 CAROTID ARTERY STENOSIS, ASYMPTOMATIC, LEFT: ICD-10-CM

## 2025-04-16 DIAGNOSIS — I65.22 CAROTID ARTERY STENOSIS, ASYMPTOMATIC, LEFT: Primary | ICD-10-CM

## 2025-04-16 PROCEDURE — 3074F SYST BP LT 130 MM HG: CPT | Performed by: INTERNAL MEDICINE

## 2025-04-16 PROCEDURE — 99214 OFFICE O/P EST MOD 30 MIN: CPT | Performed by: INTERNAL MEDICINE

## 2025-04-16 PROCEDURE — 3078F DIAST BP <80 MM HG: CPT | Performed by: INTERNAL MEDICINE

## 2025-04-16 PROCEDURE — 93880 EXTRACRANIAL BILAT STUDY: CPT

## 2025-04-16 PROCEDURE — 93922 UPR/L XTREMITY ART 2 LEVELS: CPT

## 2025-04-16 NOTE — PROGRESS NOTES
Riya Bowser was seen on 4/16/2025 for   Chief Complaint   Patient presents with    Peripheral artery disease     1 year follow up. Patient reports occasional muscle spams in bilateral thighs. Denies leg swelling. Completed CHERYLE study and carotid US.                               REVIEW OF SYSTEMS    Constitutional Weight: absent, A, Fatigue: absent Fever: absent   HEENT Ears: normal,Visual disturbance: absent Sore throat: absent,    Respiratory Shortness of breath: absent, Cough: absent;, Snoring: absent   Cardivascular Chest pain: absent,  Leg pain: absent,Leg swelling:absent, Non-healing wound:absent    GI Diarrhea: absent, Abdominal Pain: absent    Urinary frequency: absent, Urinary incontinence: absent   Musculoskeletal Neck pain: absent, Back pain: absent, Restless Leg:absent     Dermatological Hair loss: absent, Skin changes: absent   Neurological  Sudden Loss of Vision in one eye:absent, Slurred Speech:absent, Weakness on one side of body: absent,Headache: absent   Psychiatric Anxiety: absent, Depression: absent   Hematologic Abnormal bleeding: absent,

## 2025-04-16 NOTE — PROGRESS NOTES
Riya Bowser was seen on 4/16/2025 for   Chief Complaint   Patient presents with    Peripheral artery disease     1 year follow up. Patient reports occasional muscle spams in bilateral thighs. Denies leg swelling. Completed LUZ study and carotid US.   .      7/6/22 Pt was evaluated by Dr Delos Reyes today for claudication (see note) after being referred by Dr Farrell. She has lifestyle limiting claudication right leg with an luz of 0.72. It inhibits her ability even to walk from her car to work.  She has had heart racing on cilostazol, forcing her to decrease dose. Not helping with leg pain. Has headache as well.   UPDATE 8/17/22 On 8/2/22 she had RCIA Visipro, REIA Visipro prox, and ZPTX distal. Right leg cramping is gone. She gets mild left leg pain with walking. Angio showed only  a mild narrowing in LCFA (right beside access sheath, requiring manual pull). Left groin healed well.   Addendum. Her hx includes a carotid stent by Peyton Hernandez at [facility]. 10mm X31 WALLSTENT. 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 Followed by him.  UPDATE 2/22/23 Arms feel weak, can't seem to gain strength.Don't cramp. Numbness in hands. Right leg feels fine. Left calf has mild discomfort with walking, but no activity limitation. Angio was remarkable only for mild cfa narrowing on left. Has been on crestor 2 yrs.   UPDATE 8/23/23 Was doing ok until 5/3/23 when she had severe GI bleed. Hgb drop to 6.6. [facility] transfer. EGD found 4 ulcers. Clipped. No bleeding since. Just got ok to go back on asa if she stays on protonix. Last CT to check carotid stent looked ok.. 4/4/23. No f/u scheduled. Left calf hurts. LUZ 8/21/23 R .95  l  0.99. Back at work.   UPDATE 4/17/24 Had carotid duplex 4/16/24 ([place]) howard < 50, lica < 50, ante verts, left stent is patent. Legs feel ok with walking. No further bleeding. No focal neuro sx.  UPDATE 4/16/25 Legs are doing ok. Numb hands. May be nerve injury or carpal tunnel.

## 2025-05-18 ENCOUNTER — HOSPITAL ENCOUNTER (EMERGENCY)
Age: 65
Discharge: HOME | End: 2025-05-18
Payer: COMMERCIAL

## 2025-05-18 ENCOUNTER — APPOINTMENT (OUTPATIENT)
Dept: GENERAL RADIOLOGY | Age: 65
End: 2025-05-18
Payer: COMMERCIAL

## 2025-05-18 VITALS
BODY MASS INDEX: 17.98 KG/M2 | HEIGHT: 66 IN | SYSTOLIC BLOOD PRESSURE: 134 MMHG | HEART RATE: 76 BPM | WEIGHT: 111.9 LBS | DIASTOLIC BLOOD PRESSURE: 55 MMHG | RESPIRATION RATE: 18 BRPM | OXYGEN SATURATION: 96 % | TEMPERATURE: 98.1 F

## 2025-05-18 DIAGNOSIS — S92.426B: ICD-10-CM

## 2025-05-18 DIAGNOSIS — S90.112A CONTUSION OF LEFT GREAT TOE WITHOUT DAMAGE TO NAIL, INITIAL ENCOUNTER: Primary | ICD-10-CM

## 2025-05-18 PROCEDURE — 73630 X-RAY EXAM OF FOOT: CPT

## 2025-05-18 PROCEDURE — 99283 EMERGENCY DEPT VISIT LOW MDM: CPT

## 2025-05-18 PROCEDURE — 6370000000 HC RX 637 (ALT 250 FOR IP)

## 2025-05-18 RX ORDER — ACETAMINOPHEN 325 MG/1
650 TABLET ORAL EVERY 6 HOURS PRN
Qty: 120 TABLET | Refills: 0 | Status: SHIPPED | OUTPATIENT
Start: 2025-05-18

## 2025-05-18 RX ADMIN — Medication 3 ML: at 17:03

## 2025-05-18 ASSESSMENT — PAIN - FUNCTIONAL ASSESSMENT
PAIN_FUNCTIONAL_ASSESSMENT: ACTIVITIES ARE NOT PREVENTED
PAIN_FUNCTIONAL_ASSESSMENT: 0-10

## 2025-05-18 ASSESSMENT — PAIN DESCRIPTION - LOCATION: LOCATION: FOOT

## 2025-05-18 ASSESSMENT — PAIN DESCRIPTION - PAIN TYPE: TYPE: ACUTE PAIN

## 2025-05-18 ASSESSMENT — LIFESTYLE VARIABLES
HOW MANY STANDARD DRINKS CONTAINING ALCOHOL DO YOU HAVE ON A TYPICAL DAY: PATIENT DOES NOT DRINK
HOW OFTEN DO YOU HAVE A DRINK CONTAINING ALCOHOL: NEVER

## 2025-05-18 ASSESSMENT — PAIN SCALES - GENERAL: PAINLEVEL_OUTOF10: 6

## 2025-05-18 ASSESSMENT — PAIN DESCRIPTION - ORIENTATION: ORIENTATION: LEFT

## 2025-05-18 ASSESSMENT — PAIN DESCRIPTION - FREQUENCY: FREQUENCY: CONTINUOUS

## 2025-05-18 ASSESSMENT — PAIN DESCRIPTION - DESCRIPTORS: DESCRIPTORS: ACHING

## 2025-05-18 NOTE — ED PROVIDER NOTES
it to be evacuated, lidocaine gel was applied, small incision made at the edge of the blister, drained mostly clear fluid, without any complication.  Does not want to wait for the radiologist interpretation of the x-rays, I advised that she may have a fracture, though I do not appreciate it on the x-ray report  She is comfortable with me calling her back if the radiologist shows a fracture, patient was discharged with an Ortho boot, once the x-ray was read by the radiologist, I did call the patient and updated her regarding the fracture noted at the base right great.    Patient was given contact information for orthopedic surgery to call and make an appointment.  She is weightbearing as tolerated.  Discuss final disposition including information such as prescriptions, follow-up instructions, and return precautions, patient/family verbalized understanding and are comfortable with being discharged at this time.    CRITICAL CARE TIME     PROCEDURES:  Unless otherwise noted below, none     Procedures    FINAL IMPRESSION      1. Contusion of left great toe without damage to nail, initial encounter    2. Nondisplaced fracture of distal phalanx of unspecified great toe, initial encounter for open fracture          DISPOSITION/PLAN     DISPOSITION Decision To Discharge 05/18/2025 05:58:35 PM   DISPOSITION CONDITION Stable           PATIENT REFERRED TO:  Lory Farrell DO  [address] [facility] [id number]  [phone]          Rigo Mirza MD  [address] [facility] [address]  [phone]            DISCHARGE MEDICATIONS:  Discharge Medication List as of 5/18/2025  5:59 PM          (Please note that portions of this note were completed with a voice recognition program.  Efforts were made to edit the dictations but occasionally words are mis-transcribed.)    Liu Pino MD (electronically signed)  Attending Emergency Physician           Liu Pino MD  Resident  05/19/25 [id number]

## 2025-05-18 NOTE — PROGRESS NOTES
Dressing applied to left great toe where MD lanced blister.  Vaseline gauze, non adherent, roll gauze and Koban applied. Patient instructed on care and sent home with supplies. Educated on use of post op shoe and RICE. Verbalizes understanding.

## 2025-05-30 ENCOUNTER — TRANSCRIBE ORDERS (OUTPATIENT)
Dept: ADMISSION | Age: 65
End: 2025-05-30

## 2025-05-30 DIAGNOSIS — S92.405A CLOSED NONDISPLACED FRACTURE OF PHALANX OF LEFT GREAT TOE, UNSPECIFIED PHALANX, INITIAL ENCOUNTER: Primary | ICD-10-CM

## 2025-06-02 ENCOUNTER — HOSPITAL ENCOUNTER (OUTPATIENT)
Dept: GENERAL RADIOLOGY | Age: 65
Discharge: HOME | End: 2025-06-04
Payer: COMMERCIAL

## 2025-06-02 DIAGNOSIS — S92.405A CLOSED NONDISPLACED FRACTURE OF PHALANX OF LEFT GREAT TOE, UNSPECIFIED PHALANX, INITIAL ENCOUNTER: ICD-10-CM

## 2025-06-02 PROCEDURE — 73660 X-RAY EXAM OF TOE(S): CPT

## 2025-06-05 ENCOUNTER — OFFICE VISIT (OUTPATIENT)
Dept: FAMILY MEDICINE CLINIC | Age: 65
End: 2025-06-05
Payer: COMMERCIAL

## 2025-06-05 VITALS
DIASTOLIC BLOOD PRESSURE: 60 MMHG | SYSTOLIC BLOOD PRESSURE: 118 MMHG | HEIGHT: 66 IN | WEIGHT: 111 LBS | HEART RATE: 99 BPM | OXYGEN SATURATION: 96 % | BODY MASS INDEX: 17.84 KG/M2

## 2025-06-05 DIAGNOSIS — H60.8X3 CHRONIC ECZEMATOUS OTITIS EXTERNA OF BOTH EARS: Primary | ICD-10-CM

## 2025-06-05 DIAGNOSIS — F32.1 CURRENT MODERATE EPISODE OF MAJOR DEPRESSIVE DISORDER WITHOUT PRIOR EPISODE (HCC): ICD-10-CM

## 2025-06-05 DIAGNOSIS — G56.03 BILATERAL CARPAL TUNNEL SYNDROME: ICD-10-CM

## 2025-06-05 DIAGNOSIS — M79.675 GREAT TOE PAIN, LEFT: ICD-10-CM

## 2025-06-05 PROCEDURE — 3078F DIAST BP <80 MM HG: CPT | Performed by: FAMILY MEDICINE

## 2025-06-05 PROCEDURE — 3074F SYST BP LT 130 MM HG: CPT | Performed by: FAMILY MEDICINE

## 2025-06-05 PROCEDURE — 99214 OFFICE O/P EST MOD 30 MIN: CPT | Performed by: FAMILY MEDICINE

## 2025-06-05 RX ORDER — ROSUVASTATIN CALCIUM 40 MG/1
40 TABLET, COATED ORAL DAILY
Qty: 90 TABLET | Refills: 1 | Status: SHIPPED | OUTPATIENT
Start: 2025-06-05

## 2025-06-05 RX ORDER — FLUOCINOLONE ACETONIDE 0.11 MG/ML
OIL AURICULAR (OTIC)
Qty: 20 ML | Refills: 2 | Status: SHIPPED | OUTPATIENT
Start: 2025-06-05

## 2025-06-05 RX ORDER — PANTOPRAZOLE SODIUM 40 MG/1
40 TABLET, DELAYED RELEASE ORAL 2 TIMES DAILY
Qty: 180 TABLET | Refills: 1 | Status: SHIPPED | OUTPATIENT
Start: 2025-06-05

## 2025-06-05 RX ORDER — CLOPIDOGREL BISULFATE 75 MG/1
75 TABLET ORAL DAILY
Qty: 90 TABLET | Refills: 3 | Status: SHIPPED | OUTPATIENT
Start: 2025-06-05 | End: 2026-05-31

## 2025-06-05 RX ORDER — AMLODIPINE BESYLATE 5 MG/1
5 TABLET ORAL DAILY
Qty: 90 TABLET | Refills: 1 | Status: SHIPPED | OUTPATIENT
Start: 2025-06-05

## 2025-06-05 RX ORDER — LOSARTAN POTASSIUM 50 MG/1
50 TABLET ORAL DAILY
Qty: 90 TABLET | Refills: 1 | Status: SHIPPED | OUTPATIENT
Start: 2025-06-05

## 2025-06-05 RX ORDER — DULOXETIN HYDROCHLORIDE 30 MG/1
30 CAPSULE, DELAYED RELEASE ORAL DAILY
Qty: 90 CAPSULE | Refills: 1 | Status: SHIPPED | OUTPATIENT
Start: 2025-06-05

## 2025-06-05 NOTE — PATIENT INSTRUCTIONS
[employer] SURVEY:    You may be receiving a survey from [employer] regarding your visit today.    You may get this in the mail, through your MyChart or in your email.     Please complete the survey to enable us to provide the highest quality of care to you and your family.      Thank you.    Clinical Care Team:   Dr. Lory Farrell, DO Per Cornejo CMA                                     Triage: Michaela Dunham CMA              Clerical Team:    Michaela Silva     [place] [phone]           Billing questions: [phone]           Medical Records Request: [phone]

## 2025-06-05 NOTE — PROGRESS NOTES
Name: Riya Bowser  : 1960         Chief Complaint:     Chief Complaint   Patient presents with    Hypertension    Carpal Tunnel     Bilateral with weakness and hand pain    Anxiety       History of Present Illness:      Riya Bowser is a 65 y.o.  female who presents with Hypertension, Carpal Tunnel (Bilateral with weakness and hand pain), and Anxiety      HPI    L great toe fx sustained 2 wks ago, slipped on basement stairs at home. Has been in a fracture shoe.     Carlos Eduardo CTS. Still hasn't had EMG, has order but hasn't scheduled. Off work right now for the toe and may want to get it done now.     H/o PUD, doing well. Diet as tolerated and usual rx.     Ears had been bothering some, using oil, out of atb gtt.     Taking cymbalta nightly before bed and feels it does help some with mood. Also doing better as boyfriend is back home for good (had been working in different state for a while). Some trouble with sleep, takes cymbalta, tylenol PM and sometimes melatonin before bed. Goes to bed around 2 AM as she is used to 2nd shift schedule. Sleeps til about 10-10:30. Up a lot, usually once for bathroom. Thinks being off work is probably messing with sched as much as anything.     Medical History:     Patient Active Problem List   Diagnosis    Chronic eczematous otitis externa of both ears    Essential hypertension    Infarction of right basal ganglia (HCC)    Pseudoaneurysm of carotid artery    Peripheral vascular disease    Atherosclerosis of native arteries of extremities with intermittent claudication, bilateral legs       Medications:       Prior to Admission medications    Medication Sig Start Date End Date Taking? Authorizing Provider   amLODIPine (NORVASC) 5 MG tablet Take 1 tablet by mouth daily 25  Yes Lory Farrell DO   clopidogrel (PLAVIX) 75 MG tablet Take 1 tablet by mouth daily 25 Yes Lory Farrell DO   DULoxetine (CYMBALTA) 30 MG extended release capsule Take 1 capsule

## 2025-06-30 ENCOUNTER — TRANSCRIBE ORDERS (OUTPATIENT)
Dept: ADMISSION | Age: 65
End: 2025-06-30

## 2025-06-30 ENCOUNTER — HOSPITAL ENCOUNTER (OUTPATIENT)
Dept: GENERAL RADIOLOGY | Age: 65
Discharge: HOME | End: 2025-07-02
Payer: COMMERCIAL

## 2025-06-30 DIAGNOSIS — S92.405D: Primary | ICD-10-CM

## 2025-06-30 DIAGNOSIS — S92.405D: ICD-10-CM

## 2025-06-30 PROCEDURE — 73660 X-RAY EXAM OF TOE(S): CPT

## (undated) DEVICE — CANNULA ORAL NSL AD CO2 N INTUB O2 DEL DISP TRU LNK

## (undated) DEVICE — SOLUTION IV IRRIG POUR BRL 0.9% SODIUM CHL 2F7124

## (undated) DEVICE — FORCEP SPEC RETRV BX AD 2 MMX155 CM 5 MM GI OVL CUP W/ NDL

## (undated) DEVICE — MEDI-VAC NON-CONDUCTIVE TUBING7MM X 30.5 (100FT): Brand: CARDINAL HEALTH